# Patient Record
Sex: MALE | Race: OTHER
[De-identification: names, ages, dates, MRNs, and addresses within clinical notes are randomized per-mention and may not be internally consistent; named-entity substitution may affect disease eponyms.]

---

## 2017-01-25 ENCOUNTER — APPOINTMENT (OUTPATIENT)
Dept: CARDIOLOGY | Facility: CLINIC | Age: 77
End: 2017-01-25

## 2017-01-25 VITALS
HEIGHT: 72 IN | SYSTOLIC BLOOD PRESSURE: 120 MMHG | WEIGHT: 189 LBS | DIASTOLIC BLOOD PRESSURE: 64 MMHG | BODY MASS INDEX: 25.6 KG/M2 | RESPIRATION RATE: 16 BRPM | HEART RATE: 65 BPM

## 2017-01-25 DIAGNOSIS — E78.5 HYPERLIPIDEMIA, UNSPECIFIED: ICD-10-CM

## 2017-01-25 DIAGNOSIS — I70.219 ATHEROSCLEROSIS OF NATIVE ARTERIES OF EXTREMITIES WITH INTERMITTENT CLAUDICATION, UNSPECIFIED EXTREMITY: ICD-10-CM

## 2017-01-25 DIAGNOSIS — I10 ESSENTIAL (PRIMARY) HYPERTENSION: ICD-10-CM

## 2017-01-25 DIAGNOSIS — I25.10 ATHEROSCLEROTIC HEART DISEASE OF NATIVE CORONARY ARTERY W/OUT ANGINA PECTORIS: ICD-10-CM

## 2017-02-01 ENCOUNTER — OUTPATIENT (OUTPATIENT)
Dept: OUTPATIENT SERVICES | Facility: HOSPITAL | Age: 77
LOS: 1 days | End: 2017-02-01
Payer: MEDICAID

## 2017-02-01 DIAGNOSIS — Z98.89 OTHER SPECIFIED POSTPROCEDURAL STATES: Chronic | ICD-10-CM

## 2017-02-01 DIAGNOSIS — Z98.61 CORONARY ANGIOPLASTY STATUS: Chronic | ICD-10-CM

## 2017-02-20 DIAGNOSIS — R69 ILLNESS, UNSPECIFIED: ICD-10-CM

## 2017-03-15 ENCOUNTER — APPOINTMENT (OUTPATIENT)
Dept: CARDIOLOGY | Facility: CLINIC | Age: 77
End: 2017-03-15

## 2017-03-15 VITALS
SYSTOLIC BLOOD PRESSURE: 124 MMHG | HEART RATE: 72 BPM | DIASTOLIC BLOOD PRESSURE: 75 MMHG | RESPIRATION RATE: 12 BRPM | BODY MASS INDEX: 24.38 KG/M2 | HEIGHT: 72 IN | WEIGHT: 180 LBS

## 2017-05-12 ENCOUNTER — APPOINTMENT (OUTPATIENT)
Dept: OTOLARYNGOLOGY | Facility: CLINIC | Age: 77
End: 2017-05-12

## 2017-05-12 VITALS
HEART RATE: 79 BPM | WEIGHT: 180 LBS | BODY MASS INDEX: 24.38 KG/M2 | HEIGHT: 72 IN | DIASTOLIC BLOOD PRESSURE: 76 MMHG | SYSTOLIC BLOOD PRESSURE: 136 MMHG

## 2017-05-12 DIAGNOSIS — Z86.79 PERSONAL HISTORY OF OTHER DISEASES OF THE CIRCULATORY SYSTEM: ICD-10-CM

## 2017-05-12 DIAGNOSIS — Z86.39 PERSONAL HISTORY OF OTHER ENDOCRINE, NUTRITIONAL AND METABOLIC DISEASE: ICD-10-CM

## 2017-05-24 ENCOUNTER — APPOINTMENT (OUTPATIENT)
Dept: CARDIOLOGY | Facility: CLINIC | Age: 77
End: 2017-05-24

## 2017-05-24 VITALS
SYSTOLIC BLOOD PRESSURE: 126 MMHG | WEIGHT: 186 LBS | BODY MASS INDEX: 25.19 KG/M2 | RESPIRATION RATE: 15 BRPM | HEIGHT: 72 IN | HEART RATE: 86 BPM | DIASTOLIC BLOOD PRESSURE: 72 MMHG

## 2017-06-21 ENCOUNTER — APPOINTMENT (OUTPATIENT)
Dept: CARDIOLOGY | Facility: CLINIC | Age: 77
End: 2017-06-21

## 2017-06-21 VITALS
HEART RATE: 76 BPM | BODY MASS INDEX: 24.52 KG/M2 | WEIGHT: 181 LBS | HEIGHT: 72 IN | SYSTOLIC BLOOD PRESSURE: 101 MMHG | RESPIRATION RATE: 12 BRPM | DIASTOLIC BLOOD PRESSURE: 66 MMHG

## 2017-06-21 RX ORDER — AMMONIUM LACTATE 12 %
12 CREAM (GRAM) TOPICAL
Qty: 385 | Refills: 0 | Status: ACTIVE | COMMUNITY
Start: 2017-02-10

## 2017-06-21 RX ORDER — MOMETASONE FUROATE 1 MG/G
0.1 OINTMENT TOPICAL
Qty: 45 | Refills: 0 | Status: ACTIVE | COMMUNITY
Start: 2017-01-05

## 2017-06-21 RX ORDER — ACETIC ACID 20 MG/ML
2 SOLUTION AURICULAR (OTIC)
Qty: 15 | Refills: 0 | Status: ACTIVE | COMMUNITY
Start: 2017-02-10

## 2017-07-07 ENCOUNTER — APPOINTMENT (OUTPATIENT)
Dept: OTOLARYNGOLOGY | Facility: CLINIC | Age: 77
End: 2017-07-07

## 2017-07-07 RX ORDER — FLUOCINOLONE ACETONIDE 0.11 MG/ML
0.01 OIL AURICULAR (OTIC)
Qty: 1 | Refills: 1 | Status: ACTIVE | COMMUNITY
Start: 2017-07-07 | End: 1900-01-01

## 2017-08-01 PROCEDURE — G9001: CPT

## 2017-08-23 ENCOUNTER — APPOINTMENT (OUTPATIENT)
Dept: CARDIOLOGY | Facility: CLINIC | Age: 77
End: 2017-08-23
Payer: MEDICARE

## 2017-08-23 VITALS
BODY MASS INDEX: 24.38 KG/M2 | SYSTOLIC BLOOD PRESSURE: 123 MMHG | RESPIRATION RATE: 15 BRPM | HEIGHT: 72 IN | DIASTOLIC BLOOD PRESSURE: 78 MMHG | HEART RATE: 67 BPM | WEIGHT: 180 LBS

## 2017-08-23 PROCEDURE — 99214 OFFICE O/P EST MOD 30 MIN: CPT

## 2017-08-24 ENCOUNTER — APPOINTMENT (OUTPATIENT)
Dept: OTOLARYNGOLOGY | Facility: CLINIC | Age: 77
End: 2017-08-24
Payer: MEDICARE

## 2017-08-24 VITALS — HEIGHT: 72 IN | WEIGHT: 176.37 LBS | BODY MASS INDEX: 23.89 KG/M2

## 2017-08-24 PROCEDURE — 99214 OFFICE O/P EST MOD 30 MIN: CPT | Mod: 25

## 2017-08-24 PROCEDURE — 31579 LARYNGOSCOPY TELESCOPIC: CPT

## 2017-08-24 RX ORDER — NYSTATIN 100000 [USP'U]/ML
100000 SUSPENSION ORAL 3 TIMES DAILY
Qty: 350 | Refills: 3 | Status: ACTIVE | COMMUNITY
Start: 2017-08-24 | End: 1900-01-01

## 2017-08-24 RX ORDER — OMEPRAZOLE 40 MG/1
40 CAPSULE, DELAYED RELEASE ORAL
Qty: 90 | Refills: 0 | Status: ACTIVE | COMMUNITY
Start: 2017-08-24 | End: 1900-01-01

## 2017-09-27 ENCOUNTER — APPOINTMENT (OUTPATIENT)
Dept: CARDIOLOGY | Facility: CLINIC | Age: 77
End: 2017-09-27
Payer: MEDICARE

## 2017-09-27 VITALS
SYSTOLIC BLOOD PRESSURE: 136 MMHG | HEART RATE: 76 BPM | DIASTOLIC BLOOD PRESSURE: 69 MMHG | BODY MASS INDEX: 34.16 KG/M2 | HEIGHT: 60 IN | WEIGHT: 174 LBS | RESPIRATION RATE: 12 BRPM

## 2017-09-27 PROCEDURE — 99214 OFFICE O/P EST MOD 30 MIN: CPT

## 2017-09-27 RX ORDER — KETOCONAZOLE 20 MG/G
2 CREAM TOPICAL
Qty: 30 | Refills: 0 | Status: ACTIVE | COMMUNITY
Start: 2017-06-06

## 2017-09-27 RX ORDER — TRIAMCINOLONE ACETONIDE 1 MG/G
0.1 CREAM TOPICAL
Qty: 454 | Refills: 0 | Status: ACTIVE | COMMUNITY
Start: 2017-05-31

## 2017-09-27 RX ORDER — HYDROCORTISONE 25 MG/G
2.5 OINTMENT TOPICAL
Qty: 28 | Refills: 0 | Status: ACTIVE | COMMUNITY
Start: 2017-05-26

## 2017-09-27 RX ORDER — TRIAMCINOLONE ACETONIDE 0.25 MG/G
0.03 CREAM TOPICAL
Qty: 454 | Refills: 0 | Status: ACTIVE | COMMUNITY
Start: 2017-06-14

## 2017-09-27 RX ORDER — CLINDAMYCIN PHOSPHATE 10 MG/ML
1 SOLUTION TOPICAL
Qty: 60 | Refills: 0 | Status: ACTIVE | COMMUNITY
Start: 2017-07-12

## 2017-09-27 RX ORDER — MOMETASONE FUROATE 1 MG/G
0.1 CREAM TOPICAL
Qty: 45 | Refills: 0 | Status: ACTIVE | COMMUNITY
Start: 2017-06-06

## 2017-09-27 RX ORDER — LORAZEPAM 0.5 MG/1
0.5 TABLET ORAL
Qty: 60 | Refills: 0 | Status: ACTIVE | COMMUNITY
Start: 2017-04-12

## 2017-09-27 RX ORDER — PREDNISONE 20 MG/1
20 TABLET ORAL
Qty: 10 | Refills: 0 | Status: ACTIVE | COMMUNITY
Start: 2017-05-31

## 2017-09-27 RX ORDER — TAMSULOSIN HYDROCHLORIDE 0.4 MG/1
0.4 CAPSULE ORAL
Qty: 30 | Refills: 0 | Status: ACTIVE | COMMUNITY
Start: 2017-08-22

## 2017-10-02 ENCOUNTER — APPOINTMENT (OUTPATIENT)
Dept: RADIOLOGY | Facility: HOSPITAL | Age: 77
End: 2017-10-02
Payer: MEDICARE

## 2017-10-02 ENCOUNTER — APPOINTMENT (OUTPATIENT)
Dept: SPEECH THERAPY | Facility: HOSPITAL | Age: 77
End: 2017-10-02
Payer: MEDICARE

## 2017-10-02 ENCOUNTER — OUTPATIENT (OUTPATIENT)
Dept: OUTPATIENT SERVICES | Facility: HOSPITAL | Age: 77
LOS: 1 days | Discharge: HOME | End: 2017-10-02

## 2017-10-02 ENCOUNTER — OUTPATIENT (OUTPATIENT)
Dept: OUTPATIENT SERVICES | Facility: HOSPITAL | Age: 77
LOS: 1 days | End: 2017-10-02

## 2017-10-02 DIAGNOSIS — Z98.61 CORONARY ANGIOPLASTY STATUS: Chronic | ICD-10-CM

## 2017-10-02 DIAGNOSIS — Z98.89 OTHER SPECIFIED POSTPROCEDURAL STATES: Chronic | ICD-10-CM

## 2017-10-02 DIAGNOSIS — R13.10 DYSPHAGIA, UNSPECIFIED: ICD-10-CM

## 2017-10-02 PROCEDURE — 74230 X-RAY XM SWLNG FUNCJ C+: CPT | Mod: 26

## 2017-10-20 ENCOUNTER — APPOINTMENT (OUTPATIENT)
Dept: OTOLARYNGOLOGY | Facility: CLINIC | Age: 77
End: 2017-10-20

## 2017-10-25 ENCOUNTER — APPOINTMENT (OUTPATIENT)
Dept: CARDIOLOGY | Facility: CLINIC | Age: 77
End: 2017-10-25
Payer: MEDICARE

## 2017-10-25 VITALS
HEART RATE: 67 BPM | BODY MASS INDEX: 24.62 KG/M2 | SYSTOLIC BLOOD PRESSURE: 128 MMHG | DIASTOLIC BLOOD PRESSURE: 74 MMHG | WEIGHT: 172 LBS | RESPIRATION RATE: 15 BRPM | HEIGHT: 70 IN

## 2017-10-25 PROCEDURE — 99214 OFFICE O/P EST MOD 30 MIN: CPT

## 2017-10-25 PROCEDURE — 93000 ELECTROCARDIOGRAM COMPLETE: CPT

## 2017-10-27 ENCOUNTER — RX RENEWAL (OUTPATIENT)
Age: 77
End: 2017-10-27

## 2017-11-13 DIAGNOSIS — R13.13 DYSPHAGIA, PHARYNGEAL PHASE: ICD-10-CM

## 2017-11-16 ENCOUNTER — APPOINTMENT (OUTPATIENT)
Dept: SPEECH THERAPY | Facility: CLINIC | Age: 77
End: 2017-11-16

## 2017-11-30 ENCOUNTER — APPOINTMENT (OUTPATIENT)
Dept: SPEECH THERAPY | Facility: CLINIC | Age: 77
End: 2017-11-30

## 2017-12-07 ENCOUNTER — APPOINTMENT (OUTPATIENT)
Dept: SPEECH THERAPY | Facility: CLINIC | Age: 77
End: 2017-12-07

## 2017-12-14 ENCOUNTER — APPOINTMENT (OUTPATIENT)
Dept: SPEECH THERAPY | Facility: CLINIC | Age: 77
End: 2017-12-14

## 2017-12-20 ENCOUNTER — APPOINTMENT (OUTPATIENT)
Dept: CARDIOLOGY | Facility: CLINIC | Age: 77
End: 2017-12-20
Payer: MEDICARE

## 2017-12-20 VITALS
RESPIRATION RATE: 12 BRPM | BODY MASS INDEX: 24.91 KG/M2 | WEIGHT: 174 LBS | HEIGHT: 70 IN | SYSTOLIC BLOOD PRESSURE: 118 MMHG | HEART RATE: 82 BPM | DIASTOLIC BLOOD PRESSURE: 69 MMHG

## 2017-12-20 PROCEDURE — 99214 OFFICE O/P EST MOD 30 MIN: CPT

## 2017-12-21 ENCOUNTER — APPOINTMENT (OUTPATIENT)
Dept: SPEECH THERAPY | Facility: CLINIC | Age: 77
End: 2017-12-21

## 2017-12-28 ENCOUNTER — APPOINTMENT (OUTPATIENT)
Dept: SPEECH THERAPY | Facility: CLINIC | Age: 77
End: 2017-12-28

## 2018-01-04 ENCOUNTER — APPOINTMENT (OUTPATIENT)
Dept: SPEECH THERAPY | Facility: CLINIC | Age: 78
End: 2018-01-04

## 2018-01-05 ENCOUNTER — RX RENEWAL (OUTPATIENT)
Age: 78
End: 2018-01-05

## 2018-01-05 RX ORDER — RANITIDINE 300 MG/1
300 TABLET ORAL
Qty: 30 | Refills: 0 | Status: ACTIVE | COMMUNITY
Start: 2017-07-07 | End: 1900-01-01

## 2018-01-11 ENCOUNTER — APPOINTMENT (OUTPATIENT)
Dept: SPEECH THERAPY | Facility: CLINIC | Age: 78
End: 2018-01-11

## 2018-01-18 ENCOUNTER — APPOINTMENT (OUTPATIENT)
Dept: SPEECH THERAPY | Facility: CLINIC | Age: 78
End: 2018-01-18

## 2018-01-24 ENCOUNTER — APPOINTMENT (OUTPATIENT)
Dept: CARDIOLOGY | Facility: CLINIC | Age: 78
End: 2018-01-24
Payer: MEDICARE

## 2018-01-24 VITALS
HEART RATE: 78 BPM | BODY MASS INDEX: 24.62 KG/M2 | WEIGHT: 172 LBS | RESPIRATION RATE: 16 BRPM | HEIGHT: 70 IN | SYSTOLIC BLOOD PRESSURE: 124 MMHG | DIASTOLIC BLOOD PRESSURE: 60 MMHG

## 2018-01-24 PROCEDURE — 93000 ELECTROCARDIOGRAM COMPLETE: CPT

## 2018-01-24 PROCEDURE — 99214 OFFICE O/P EST MOD 30 MIN: CPT

## 2018-01-25 ENCOUNTER — APPOINTMENT (OUTPATIENT)
Dept: SPEECH THERAPY | Facility: CLINIC | Age: 78
End: 2018-01-25

## 2018-01-25 ENCOUNTER — APPOINTMENT (OUTPATIENT)
Dept: OTOLARYNGOLOGY | Facility: CLINIC | Age: 78
End: 2018-01-25
Payer: MEDICARE

## 2018-01-25 VITALS
HEIGHT: 67 IN | WEIGHT: 185 LBS | DIASTOLIC BLOOD PRESSURE: 78 MMHG | HEART RATE: 68 BPM | SYSTOLIC BLOOD PRESSURE: 134 MMHG | BODY MASS INDEX: 29.03 KG/M2

## 2018-01-25 VITALS — WEIGHT: 185 LBS | HEIGHT: 67.32 IN | BODY MASS INDEX: 28.7 KG/M2

## 2018-01-25 DIAGNOSIS — H92.02 OTALGIA, LEFT EAR: ICD-10-CM

## 2018-01-25 DIAGNOSIS — R22.1 LOCALIZED SWELLING, MASS AND LUMP, NECK: ICD-10-CM

## 2018-01-25 PROCEDURE — ZZZZZ: CPT

## 2018-01-25 PROCEDURE — 31575 DIAGNOSTIC LARYNGOSCOPY: CPT

## 2018-01-25 PROCEDURE — 99214 OFFICE O/P EST MOD 30 MIN: CPT | Mod: 25

## 2018-01-25 RX ORDER — PANCRELIPASE 36000; 180000; 114000 [USP'U]/1; [USP'U]/1; [USP'U]/1
36000-114000 CAPSULE, DELAYED RELEASE PELLETS ORAL
Refills: 0 | Status: ACTIVE | COMMUNITY

## 2018-01-25 RX ORDER — ASPIRIN 325 MG/1
TABLET, FILM COATED ORAL
Refills: 0 | Status: ACTIVE | COMMUNITY

## 2018-01-26 ENCOUNTER — APPOINTMENT (OUTPATIENT)
Dept: CARDIOLOGY | Facility: CLINIC | Age: 78
End: 2018-01-26
Payer: MEDICARE

## 2018-01-26 PROCEDURE — 93306 TTE W/DOPPLER COMPLETE: CPT

## 2018-02-02 ENCOUNTER — OUTPATIENT (OUTPATIENT)
Dept: OUTPATIENT SERVICES | Facility: HOSPITAL | Age: 78
LOS: 1 days | End: 2018-02-02

## 2018-02-02 VITALS
WEIGHT: 179.02 LBS | SYSTOLIC BLOOD PRESSURE: 138 MMHG | TEMPERATURE: 98 F | DIASTOLIC BLOOD PRESSURE: 72 MMHG | RESPIRATION RATE: 18 BRPM | HEIGHT: 67.5 IN | HEART RATE: 66 BPM

## 2018-02-02 DIAGNOSIS — E11.9 TYPE 2 DIABETES MELLITUS WITHOUT COMPLICATIONS: ICD-10-CM

## 2018-02-02 DIAGNOSIS — R49.0 DYSPHONIA: ICD-10-CM

## 2018-02-02 DIAGNOSIS — I73.9 PERIPHERAL VASCULAR DISEASE, UNSPECIFIED: ICD-10-CM

## 2018-02-02 DIAGNOSIS — Z98.89 OTHER SPECIFIED POSTPROCEDURAL STATES: Chronic | ICD-10-CM

## 2018-02-02 DIAGNOSIS — I25.10 ATHEROSCLEROTIC HEART DISEASE OF NATIVE CORONARY ARTERY WITHOUT ANGINA PECTORIS: ICD-10-CM

## 2018-02-02 DIAGNOSIS — I10 ESSENTIAL (PRIMARY) HYPERTENSION: ICD-10-CM

## 2018-02-02 DIAGNOSIS — Z98.61 CORONARY ANGIOPLASTY STATUS: Chronic | ICD-10-CM

## 2018-02-02 LAB
BUN SERPL-MCNC: 13 MG/DL — SIGNIFICANT CHANGE UP (ref 7–23)
CALCIUM SERPL-MCNC: 8.9 MG/DL — SIGNIFICANT CHANGE UP (ref 8.4–10.5)
CHLORIDE SERPL-SCNC: 99 MMOL/L — SIGNIFICANT CHANGE UP (ref 98–107)
CO2 SERPL-SCNC: 26 MMOL/L — SIGNIFICANT CHANGE UP (ref 22–31)
CREAT SERPL-MCNC: 0.93 MG/DL — SIGNIFICANT CHANGE UP (ref 0.5–1.3)
GLUCOSE SERPL-MCNC: 215 MG/DL — HIGH (ref 70–99)
HBA1C BLD-MCNC: 6.7 % — HIGH (ref 4–5.6)
HCT VFR BLD CALC: 37.5 % — LOW (ref 39–50)
HGB BLD-MCNC: 12.8 G/DL — LOW (ref 13–17)
MCHC RBC-ENTMCNC: 29.9 PG — SIGNIFICANT CHANGE UP (ref 27–34)
MCHC RBC-ENTMCNC: 34.1 % — SIGNIFICANT CHANGE UP (ref 32–36)
MCV RBC AUTO: 87.6 FL — SIGNIFICANT CHANGE UP (ref 80–100)
NRBC # FLD: 0 — SIGNIFICANT CHANGE UP
PLATELET # BLD AUTO: 135 K/UL — LOW (ref 150–400)
PMV BLD: 10.3 FL — SIGNIFICANT CHANGE UP (ref 7–13)
POTASSIUM SERPL-MCNC: 4 MMOL/L — SIGNIFICANT CHANGE UP (ref 3.5–5.3)
POTASSIUM SERPL-SCNC: 4 MMOL/L — SIGNIFICANT CHANGE UP (ref 3.5–5.3)
RBC # BLD: 4.28 M/UL — SIGNIFICANT CHANGE UP (ref 4.2–5.8)
RBC # FLD: 13.4 % — SIGNIFICANT CHANGE UP (ref 10.3–14.5)
SODIUM SERPL-SCNC: 139 MMOL/L — SIGNIFICANT CHANGE UP (ref 135–145)
WBC # BLD: 6.53 K/UL — SIGNIFICANT CHANGE UP (ref 3.8–10.5)
WBC # FLD AUTO: 6.53 K/UL — SIGNIFICANT CHANGE UP (ref 3.8–10.5)

## 2018-02-02 RX ORDER — CILOSTAZOL 100 MG/1
1 TABLET ORAL
Qty: 0 | Refills: 0 | COMMUNITY

## 2018-02-02 RX ORDER — SODIUM CHLORIDE 9 MG/ML
1000 INJECTION, SOLUTION INTRAVENOUS
Qty: 0 | Refills: 0 | Status: DISCONTINUED | OUTPATIENT
Start: 2018-02-07 | End: 2018-02-22

## 2018-02-02 RX ORDER — LINAGLIPTIN 5 MG/1
1 TABLET, FILM COATED ORAL
Qty: 0 | Refills: 0 | COMMUNITY

## 2018-02-02 NOTE — H&P PST ADULT - NEGATIVE NEUROLOGICAL SYMPTOMS
no transient paralysis/no headache/no paresthesias/no vertigo/no loss of sensation/no weakness/no generalized seizures/no difficulty walking

## 2018-02-02 NOTE — H&P PST ADULT - PSH
H/O coronary angioplasty    History of appendectomy    S/P peripheral artery angioplasty  with SFA stents  "I'm not sure"

## 2018-02-02 NOTE — H&P PST ADULT - MUSCULOSKELETAL
details… detailed exam no joint warmth/no calf tenderness/normal strength/no joint swelling/no joint erythema/ROM intact

## 2018-02-02 NOTE — H&P PST ADULT - NEGATIVE MUSCULOSKELETAL SYMPTOMS
no muscle weakness/no back pain/no leg pain L/no myalgia/no stiffness/no arm pain R/no leg pain R/no arthralgia/no muscle cramps/no joint swelling

## 2018-02-02 NOTE — H&P PST ADULT - ENT GEN HX ROS MEA POS PC
slight dysphagia, globulus sensation/hearing difficulty dysphonia, slight dysphagia, globulus sensation/hearing difficulty

## 2018-02-02 NOTE — H&P PST ADULT - PROBLEM SELECTOR PLAN 3
Pt instructed to hold trajenta and glipizide AM of procedure.  OR booking notified of pt's diabetes status via fax.

## 2018-02-02 NOTE — H&P PST ADULT - HISTORY OF PRESENT ILLNESS
76 yo male with PMH DM2, CAD, hypertension, hyperlipidemia, PVD presents to pre surgical testing.  Pt reports he started to experience voice hoarseness and globus sensation for 1.5 years, and received therapy with limited relief.  Pt reports MRI done, revealed posterior neck mass.  Pt reports laryngoscopy done Jan 2017, revealed swelling and inflammation.   Pt is scheduled for injection laryngoplasty on 2/7/18.  Patient is a poor historian. 76 yo male with PMH DM2, CAD, hypertension, hyperlipidemia, PVD presents to pre surgical testing.  Pt reports he started to experience voice hoarseness and globus sensation for 1.5 years, and received therapy with limited relief.  Pt reports MRI done, revealed posterior neck mass.  Pt reports slight dysphagia, esophagogram done, revealed no aspiration.  Pt reports laryngoscopy done Jan 2017, revealed swelling and inflammation.   Pt is scheduled for injection laryngoplasty on 2/7/18.

## 2018-02-02 NOTE — H&P PST ADULT - PROBLEM SELECTOR PLAN 1
Pt is scheduled for injection laryngoplasty on 2/7/18.  Pre op instructions including chlorhexidine wash given and pt able to verbalize understanding.

## 2018-02-02 NOTE — H&P PST ADULT - PMH
CAD (coronary artery disease)  5 stents, last one placed around 2014  DM (diabetes mellitus)  type 2  Dysphagia    Dysphonia    Former smoker    HLD (hyperlipidemia)    HTN (hypertension)    MI, old  patient denies  PVD (peripheral vascular disease)    Stented coronary artery

## 2018-02-02 NOTE — H&P PST ADULT - NSANTHOSAYNRD_GEN_A_CORE
No. COLT screening performed.  STOP BANG Legend: 0-2 = LOW Risk; 3-4 = INTERMEDIATE Risk; 5-8 = HIGH Risk

## 2018-02-02 NOTE — H&P PST ADULT - PROBLEM SELECTOR PLAN 4
Pt instructed to take losartan and atenolol AM of surgery with a sip of water.  Pt met STOP BANG score for COLT precaution. OR booking notified via fax.

## 2018-02-02 NOTE — H&P PST ADULT - LYMPHATIC
posterior cervical R/anterior cervical R/posterior cervical L/anterior cervical L/supraclavicular L/supraclavicular R

## 2018-02-02 NOTE — H&P PST ADULT - NEGATIVE CARDIOVASCULAR SYMPTOMS
no paroxysmal nocturnal dyspnea/no palpitations/no chest pain/no dyspnea on exertion/no orthopnea/no peripheral edema

## 2018-02-07 ENCOUNTER — APPOINTMENT (OUTPATIENT)
Dept: OTOLARYNGOLOGY | Facility: HOSPITAL | Age: 78
End: 2018-02-07

## 2018-02-07 ENCOUNTER — OUTPATIENT (OUTPATIENT)
Dept: OUTPATIENT SERVICES | Facility: HOSPITAL | Age: 78
LOS: 1 days | Discharge: HOME | End: 2018-02-07
Payer: MEDICARE

## 2018-02-07 ENCOUNTER — RESULT REVIEW (OUTPATIENT)
Age: 78
End: 2018-02-07

## 2018-02-07 VITALS
RESPIRATION RATE: 16 BRPM | DIASTOLIC BLOOD PRESSURE: 81 MMHG | OXYGEN SATURATION: 100 % | HEART RATE: 77 BPM | SYSTOLIC BLOOD PRESSURE: 148 MMHG

## 2018-02-07 VITALS
HEART RATE: 55 BPM | TEMPERATURE: 98 F | RESPIRATION RATE: 16 BRPM | OXYGEN SATURATION: 97 % | DIASTOLIC BLOOD PRESSURE: 67 MMHG | SYSTOLIC BLOOD PRESSURE: 115 MMHG

## 2018-02-07 DIAGNOSIS — Z98.89 OTHER SPECIFIED POSTPROCEDURAL STATES: Chronic | ICD-10-CM

## 2018-02-07 DIAGNOSIS — Z98.61 CORONARY ANGIOPLASTY STATUS: Chronic | ICD-10-CM

## 2018-02-07 DIAGNOSIS — R49.0 DYSPHONIA: ICD-10-CM

## 2018-02-07 PROCEDURE — 31571 LARYNGOSCOP W/VC INJ + SCOPE: CPT

## 2018-02-07 PROCEDURE — 88304 TISSUE EXAM BY PATHOLOGIST: CPT | Mod: 26

## 2018-02-07 PROCEDURE — 21556 EXC NECK TUM DEEP < 5 CM: CPT | Mod: GC

## 2018-02-07 RX ORDER — HYDROMORPHONE HYDROCHLORIDE 2 MG/ML
0.5 INJECTION INTRAMUSCULAR; INTRAVENOUS; SUBCUTANEOUS
Qty: 0 | Refills: 0 | Status: DISCONTINUED | OUTPATIENT
Start: 2018-02-07 | End: 2018-02-08

## 2018-02-07 RX ORDER — SODIUM CHLORIDE 9 MG/ML
1000 INJECTION, SOLUTION INTRAVENOUS
Qty: 0 | Refills: 0 | Status: DISCONTINUED | OUTPATIENT
Start: 2018-02-07 | End: 2018-02-22

## 2018-02-07 RX ORDER — FENTANYL CITRATE 50 UG/ML
25 INJECTION INTRAVENOUS
Qty: 0 | Refills: 0 | Status: DISCONTINUED | OUTPATIENT
Start: 2018-02-07 | End: 2018-02-08

## 2018-02-07 RX ORDER — OXYCODONE AND ACETAMINOPHEN 5; 325 MG/1; MG/1
1 TABLET ORAL EVERY 6 HOURS
Qty: 0 | Refills: 0 | Status: DISCONTINUED | OUTPATIENT
Start: 2018-02-07 | End: 2018-02-07

## 2018-02-07 RX ADMIN — HYDROMORPHONE HYDROCHLORIDE 0.5 MILLIGRAM(S): 2 INJECTION INTRAMUSCULAR; INTRAVENOUS; SUBCUTANEOUS at 20:00

## 2018-02-07 RX ADMIN — HYDROMORPHONE HYDROCHLORIDE 0.5 MILLIGRAM(S): 2 INJECTION INTRAMUSCULAR; INTRAVENOUS; SUBCUTANEOUS at 19:50

## 2018-02-07 RX ADMIN — SODIUM CHLORIDE 30 MILLILITER(S): 9 INJECTION, SOLUTION INTRAVENOUS at 13:05

## 2018-02-07 RX ADMIN — SODIUM CHLORIDE 100 MILLILITER(S): 9 INJECTION, SOLUTION INTRAVENOUS at 19:15

## 2018-02-07 NOTE — ASU DISCHARGE PLAN (ADULT/PEDIATRIC). - SPECIAL INSTRUCTIONS
Keep incision dry for 48 hrs then may shower normally.    No shouting or whispering for 5 days.  You may speak in normal voice only.  Minimize talking as much as possible

## 2018-02-07 NOTE — ASU DISCHARGE PLAN (ADULT/PEDIATRIC). - MEDICATION SUMMARY - MEDICATIONS TO TAKE
I will START or STAY ON the medications listed below when I get home from the hospital:    oxyCODONE-acetaminophen 5 mg-325 mg oral tablet  -- 1 tab(s) by mouth every 6 hours, As needed, Moderate Pain (4 - 6) MDD:4  -- Indication: For pain    aspirin 81 mg oral tablet  -- 1 tab(s) by mouth once a day in am  -- Indication: For home med    losartan 50 mg oral tablet  -- 1 tab(s) by mouth once a day in am  -- Indication: For home med    tamsulosin 0.4 mg oral capsule  -- 1 cap(s) by mouth once a day in pm  -- Indication: For home med    LORazepam 0.5 mg oral tablet  -- 1 tab(s) by mouth once a day in pm  -- Indication: For home med    glipiZIDE 10 mg oral tablet, extended release  -- 1 tab(s) by mouth once a day in am  -- Indication: For home med    Tradjenta 5 mg oral tablet  -- 1 tab(s) by mouth once a day AM  -- Indication: For home med    loratadine 10 mg oral tablet  -- 1 tab(s) by mouth 2 times a day  -- Indication: For home med    atorvastatin 10 mg oral tablet  -- 1 tab(s) by mouth once a day in the pm  -- Indication: For home med    Effient 5 mg oral tablet  -- 1 tab(s) by mouth once a day in the am/ last dose 01/29/18  -- Indication: For home med    atenolol 25 mg oral tablet  -- 1 tab(s) by mouth once a day in am  -- Indication: For home med    Creon 36,000 units oral delayed release capsule  -- 1 cap(s) by mouth 3 times a day  -- Indication: For home med    raNITIdine 300 mg oral tablet  -- 1 tab(s) by mouth once a day (at bedtime)  -- Indication: For home med    Senna 8.6 mg oral tablet  -- 2 tab(s) by mouth 2 times a day  -- Indication: For home med    cilostazol 100 mg oral tablet  -- 1 tab(s) by mouth 2 times a day/ last dose 01/29/18  -- Indication: For home med    Dexilant 60 mg oral delayed release capsule  -- 1 cap(s) by mouth once a day in the am  -- Indication: For home med    Thera M oral tablet  -- 1 tab(s) by mouth once a day/ last dose 2/2/18  -- Indication: For home med

## 2018-02-07 NOTE — ASU DISCHARGE PLAN (ADULT/PEDIATRIC). - NURSING INSTRUCTIONS
DO NOT take any Tylenol (Acetaminophen) or narcotics containing Tylenol until after  10:45. You received Tylenol during your operation and it can cause damage to your liver if too much is taken within a 24 hour time period.

## 2018-02-07 NOTE — ASU PREOP CHECKLIST - SELECT TESTS ORDERED
CMP/EKG/Type and Screen/CBC Type and Screen/CMP/107/CBC/EKG EKG/Type and Screen/CMP/107/POCT Blood Glucose/CBC

## 2018-02-07 NOTE — ASU DISCHARGE PLAN (ADULT/PEDIATRIC). - POST OP PHONE #
pt. granted permission to leave message /and or speak with whoever answers the phone.  pt. granted permission to leave message /and or speak with whoever answers the phone.

## 2018-02-08 ENCOUNTER — TRANSCRIPTION ENCOUNTER (OUTPATIENT)
Age: 78
End: 2018-02-08

## 2018-02-09 DIAGNOSIS — R13.12 DYSPHAGIA, OROPHARYNGEAL PHASE: ICD-10-CM

## 2018-02-11 ENCOUNTER — TRANSCRIPTION ENCOUNTER (OUTPATIENT)
Age: 78
End: 2018-02-11

## 2018-02-21 ENCOUNTER — APPOINTMENT (OUTPATIENT)
Dept: CARDIOLOGY | Facility: CLINIC | Age: 78
End: 2018-02-21

## 2018-02-28 ENCOUNTER — APPOINTMENT (OUTPATIENT)
Dept: OTOLARYNGOLOGY | Facility: CLINIC | Age: 78
End: 2018-02-28
Payer: MEDICARE

## 2018-02-28 VITALS
HEIGHT: 67 IN | HEART RATE: 71 BPM | BODY MASS INDEX: 29.03 KG/M2 | WEIGHT: 185 LBS | DIASTOLIC BLOOD PRESSURE: 77 MMHG | SYSTOLIC BLOOD PRESSURE: 137 MMHG

## 2018-02-28 DIAGNOSIS — J38.3 OTHER DISEASES OF VOCAL CORDS: ICD-10-CM

## 2018-02-28 DIAGNOSIS — R49.0 DYSPHONIA: ICD-10-CM

## 2018-02-28 DIAGNOSIS — R13.12 DYSPHAGIA, OROPHARYNGEAL PHASE: ICD-10-CM

## 2018-02-28 DIAGNOSIS — J38.4 EDEMA OF LARYNX: ICD-10-CM

## 2018-02-28 PROCEDURE — 31575 DIAGNOSTIC LARYNGOSCOPY: CPT | Mod: 58

## 2018-02-28 PROCEDURE — 99024 POSTOP FOLLOW-UP VISIT: CPT

## 2018-02-28 RX ORDER — AMOXICILLIN AND CLAVULANATE POTASSIUM 875; 125 MG/1; MG/1
875-125 TABLET, COATED ORAL
Qty: 20 | Refills: 0 | Status: DISCONTINUED | COMMUNITY
Start: 2016-12-28 | End: 2018-02-28

## 2018-02-28 RX ORDER — CIPROFLOXACIN HYDROCHLORIDE 500 MG/1
500 TABLET, FILM COATED ORAL
Qty: 14 | Refills: 0 | Status: DISCONTINUED | COMMUNITY
Start: 2017-08-21 | End: 2018-02-28

## 2018-03-08 ENCOUNTER — APPOINTMENT (OUTPATIENT)
Dept: SPEECH THERAPY | Facility: CLINIC | Age: 78
End: 2018-03-08

## 2018-03-10 PROBLEM — R13.12 DYSPHAGIA, OROPHARYNGEAL: Status: ACTIVE | Noted: 2017-07-07

## 2018-03-10 PROBLEM — J38.4 LARYNGEAL EDEMA: Status: ACTIVE | Noted: 2017-07-07

## 2018-03-10 PROBLEM — J38.3 LESION OF VOCAL CORD: Status: ACTIVE | Noted: 2017-07-07

## 2018-03-10 PROBLEM — R49.0 DYSPHONIA: Status: ACTIVE | Noted: 2017-05-12

## 2018-03-15 ENCOUNTER — APPOINTMENT (OUTPATIENT)
Dept: SPEECH THERAPY | Facility: CLINIC | Age: 78
End: 2018-03-15

## 2018-03-21 DIAGNOSIS — R49.0 DYSPHONIA: ICD-10-CM

## 2018-03-29 ENCOUNTER — APPOINTMENT (OUTPATIENT)
Dept: SPEECH THERAPY | Facility: CLINIC | Age: 78
End: 2018-03-29

## 2018-04-05 ENCOUNTER — APPOINTMENT (OUTPATIENT)
Dept: SPEECH THERAPY | Facility: CLINIC | Age: 78
End: 2018-04-05

## 2018-04-09 ENCOUNTER — APPOINTMENT (OUTPATIENT)
Dept: SPEECH THERAPY | Facility: CLINIC | Age: 78
End: 2018-04-09

## 2018-04-11 ENCOUNTER — APPOINTMENT (OUTPATIENT)
Dept: CARDIOLOGY | Facility: CLINIC | Age: 78
End: 2018-04-11
Payer: MEDICARE

## 2018-04-11 VITALS
DIASTOLIC BLOOD PRESSURE: 60 MMHG | BODY MASS INDEX: 28.41 KG/M2 | HEIGHT: 67 IN | RESPIRATION RATE: 15 BRPM | WEIGHT: 181 LBS | HEART RATE: 65 BPM | SYSTOLIC BLOOD PRESSURE: 125 MMHG

## 2018-04-11 PROCEDURE — 99214 OFFICE O/P EST MOD 30 MIN: CPT

## 2018-04-11 PROCEDURE — 93000 ELECTROCARDIOGRAM COMPLETE: CPT

## 2018-04-11 RX ORDER — ISOPROPYL ALCOHOL 0.75 G/1
SWAB TOPICAL
Qty: 100 | Refills: 0 | Status: ACTIVE | COMMUNITY
Start: 2017-11-17

## 2018-04-11 RX ORDER — CLINDAMYCIN HYDROCHLORIDE 150 MG/1
150 CAPSULE ORAL
Qty: 28 | Refills: 0 | Status: ACTIVE | COMMUNITY
Start: 2018-01-19

## 2018-04-11 RX ORDER — OXYCODONE AND ACETAMINOPHEN 5; 325 MG/1; MG/1
5-325 TABLET ORAL
Qty: 5 | Refills: 0 | Status: ACTIVE | COMMUNITY
Start: 2018-02-08

## 2018-04-11 RX ORDER — RANITIDINE 150 MG/1
150 TABLET ORAL
Qty: 30 | Refills: 0 | Status: ACTIVE | COMMUNITY
Start: 2018-02-24

## 2018-04-12 VITALS
RESPIRATION RATE: 12 BRPM | WEIGHT: 180 LBS | DIASTOLIC BLOOD PRESSURE: 66 MMHG | SYSTOLIC BLOOD PRESSURE: 106 MMHG | HEIGHT: 67 IN | HEART RATE: 88 BPM | BODY MASS INDEX: 28.25 KG/M2

## 2018-04-16 ENCOUNTER — APPOINTMENT (OUTPATIENT)
Dept: SPEECH THERAPY | Facility: CLINIC | Age: 78
End: 2018-04-16

## 2018-04-23 ENCOUNTER — APPOINTMENT (OUTPATIENT)
Dept: SPEECH THERAPY | Facility: CLINIC | Age: 78
End: 2018-04-23

## 2018-04-30 ENCOUNTER — APPOINTMENT (OUTPATIENT)
Dept: SPEECH THERAPY | Facility: CLINIC | Age: 78
End: 2018-04-30

## 2018-04-30 ENCOUNTER — OUTPATIENT (OUTPATIENT)
Dept: OUTPATIENT SERVICES | Facility: HOSPITAL | Age: 78
LOS: 1 days | Discharge: HOME | End: 2018-04-30

## 2018-04-30 DIAGNOSIS — Z98.89 OTHER SPECIFIED POSTPROCEDURAL STATES: Chronic | ICD-10-CM

## 2018-04-30 DIAGNOSIS — Z98.61 CORONARY ANGIOPLASTY STATUS: Chronic | ICD-10-CM

## 2018-05-07 DIAGNOSIS — R49.0 DYSPHONIA: ICD-10-CM

## 2018-05-14 ENCOUNTER — APPOINTMENT (OUTPATIENT)
Dept: SPEECH THERAPY | Facility: CLINIC | Age: 78
End: 2018-05-14

## 2018-05-21 ENCOUNTER — APPOINTMENT (OUTPATIENT)
Dept: SPEECH THERAPY | Facility: CLINIC | Age: 78
End: 2018-05-21

## 2018-05-30 ENCOUNTER — OUTPATIENT (OUTPATIENT)
Dept: OUTPATIENT SERVICES | Facility: HOSPITAL | Age: 78
LOS: 1 days | End: 2018-05-30

## 2018-05-30 ENCOUNTER — APPOINTMENT (OUTPATIENT)
Dept: SPEECH THERAPY | Facility: HOSPITAL | Age: 78
End: 2018-05-30
Payer: MEDICARE

## 2018-05-30 ENCOUNTER — APPOINTMENT (OUTPATIENT)
Dept: RADIOLOGY | Facility: HOSPITAL | Age: 78
End: 2018-05-30
Payer: MEDICARE

## 2018-05-30 DIAGNOSIS — R13.12 DYSPHAGIA, OROPHARYNGEAL PHASE: ICD-10-CM

## 2018-05-30 DIAGNOSIS — Z98.89 OTHER SPECIFIED POSTPROCEDURAL STATES: Chronic | ICD-10-CM

## 2018-05-30 DIAGNOSIS — R13.13 DYSPHAGIA, PHARYNGEAL PHASE: ICD-10-CM

## 2018-05-30 DIAGNOSIS — Z98.61 CORONARY ANGIOPLASTY STATUS: Chronic | ICD-10-CM

## 2018-05-30 PROCEDURE — 74230 X-RAY XM SWLNG FUNCJ C+: CPT | Mod: 26

## 2018-05-31 ENCOUNTER — APPOINTMENT (OUTPATIENT)
Dept: CARDIOLOGY | Facility: CLINIC | Age: 78
End: 2018-05-31
Payer: MEDICARE

## 2018-05-31 VITALS
BODY MASS INDEX: 28.35 KG/M2 | OXYGEN SATURATION: 97 % | WEIGHT: 181 LBS | RESPIRATION RATE: 17 BRPM | HEART RATE: 64 BPM | DIASTOLIC BLOOD PRESSURE: 65 MMHG | SYSTOLIC BLOOD PRESSURE: 113 MMHG | TEMPERATURE: 97.7 F

## 2018-05-31 PROCEDURE — 99215 OFFICE O/P EST HI 40 MIN: CPT

## 2018-05-31 PROCEDURE — 93000 ELECTROCARDIOGRAM COMPLETE: CPT

## 2018-05-31 PROCEDURE — 93922 UPR/L XTREMITY ART 2 LEVELS: CPT

## 2018-06-19 ENCOUNTER — APPOINTMENT (OUTPATIENT)
Dept: CARDIOLOGY | Facility: CLINIC | Age: 78
End: 2018-06-19
Payer: MEDICARE

## 2018-06-19 PROCEDURE — A9500: CPT

## 2018-06-19 PROCEDURE — 93015 CV STRESS TEST SUPVJ I&R: CPT

## 2018-06-19 PROCEDURE — 78452 HT MUSCLE IMAGE SPECT MULT: CPT

## 2018-06-20 NOTE — ASU PATIENT PROFILE, ADULT - SUBSTANCE USE COMMENT, PROFILE
Controlling High Blood Pressure  High blood pressure (hypertension) is often called the silent killer. This is because many people who have it dont know it. High blood pressure is defined as 140/90 mm Hg or higher. Know your blood pressure and remember to check it regularly. Doing so can save your life. Here are some things you can do to help control your blood pressure.    Choose heart-healthy foods  · Select low-salt, low-fat foods. Limit sodium intake to 2,400 mg per day or the amount suggested by your healthcare provider.  · Limit canned, dried, cured, packaged, and fast foods. These can contain a lot of salt.  · Eat 8 to 10 servings of fruits and vegetables every day.  · Choose lean meats, fish, or chicken.  · Eat whole-grain pasta, brown rice, and beans.  · Eat 2 to 3 servings of low-fat or fat-free dairy products.  · Ask your doctor about the DASH eating plan. This plan helps reduce blood pressure.  · When you go to a restaurant, ask that your meal be prepared with no added salt.  Maintain a healthy weight  · Ask your healthcare provider how many calories to eat a day. Then stick to that number.  · Ask your healthcare provider what weight range is healthiest for you. If you are overweight, a weight loss of only 3% to 5% of your body weight can help lower blood pressure. Generally, a good weight loss goal is to lose 10% of your body weight in a year.  · Limit snacks and sweets.  · Get regular exercise.  Get up and get active  · Choose activities you enjoy. Find ones you can do with friends or family. This includes bicycling, dancing, walking, and jogging.  · Park farther away from building entrances.  · Use stairs instead of the elevator.  · When you can, walk or bike instead of driving.  · [place] leaves, garden, or do household repairs.  · Be active at a moderate to vigorous level of physical activity for at least 40 minutes for a minimum of 3 to 4 days a week.   Manage stress  · Make time to relax and enjoy  life. Find time to laugh.  · Communicate your concerns with your loved ones and your healthcare provider.  · Visit with family and friends, and keep up with hobbies.  Limit alcohol and quit smoking  · Men should have no more than 2 drinks per day.  · Women should have no more than 1 drink per day.  · Talk with your healthcare provider about quitting smoking. Smoking significantly increases your risk for heart disease and stroke. Ask your healthcare provider about community smoking cessation programs and other options.  Medicines  If lifestyle changes arent enough, your healthcare provider may prescribe high blood pressure medicine. Take all medicines as prescribed. If you have any questions about your medicines, ask your healthcare provider before stopping or changing them.   Date Last Reviewed: 4/27/2016 © 2000-2017 [employer]. [address] [place] [address], [place] [address]. All rights reserved. This information is not intended as a substitute for professional medical care. Always follow your healthcare professional's instructions.        Walking for Fitness  Fitness walking has something for everyone, even people who are already fit. Walking is one of the safest ways to condition your body aerobically. It can boost energy, help you lose weight, and reduce stress.    Physical benefits  · Walking strengthens your heart and lungs, and tones your muscles.  · When walking, your feet land with less impact than in other sports. This reduces chances of muscle, bone, and joint injury.  · Regular walking improves your cholesterol levels and lowers your risk of heart disease. And it helps you control your blood sugar if you have diabetes.  · Walking is a weight-bearing activity, which helps maintain bone density. This can help prevent osteoporosis.  Personal rewards  · Taking walks can help you relax and manage stress. And fitness walking may make you feel better about yourself.  · Walking can help you sleep  better at night and make you less likely to be depressed.  · Regular walking may help maintain your memory as you get older.  · Walking is a great way to spend extra time with friends and family members. Be sure to invite your dog along!  Q&A about fitness walking  Q: Will walking keep me fit?  A: Yes. Regular walking at the right pace gives you all the benefits of other aerobic activities, such as jogging and swimming.  Q: Will walking help me lose weight and keep it off?  A: Yes. Per mile, walking can burn as many calories as jogging. Your health care provider can help work walking into your weight-loss plan.  Q: Is walking safe for my health?  A: Yes. Walking is safe if you have high blood pressure, diabetes, heart disease, or other conditions. Talk to your healthcare provider before you start.  Date Last Reviewed: 4/1/2017  © 7359-4391 [employer]. [address] [place] [address]. All rights reserved. This information is not intended as a substitute for professional medical care. Always follow your healthcare professional's instructions.        Weight Management: Getting Started  Healthy bodies come in all shapes and sizes. Not all bodies are made to be thin. For some people, a healthy weight is higher than the average weight listed on weight charts. Your healthcare provider can help you decide on a healthy weight for you.    Reasons to lose weight  Losing weight can help with some health problems, such as high blood pressure, heart disease, diabetes, sleep apnea, and arthritis. You may also feel more energy.  Set your long-term goal  Your goal doesn't even have to be a specific weight. You may decide on a fitness goal (such as being able to walk 10 miles a week), or a health goal (such as lowering your blood pressure). Choose a goal that is measurable and reasonable, so you know when you've reached it. A goal of reaching a BMI of less than 25 is not always reasonable (or  possible).   Make an action plan  Habits dont change overnight. Setting your goals too high can leave you feeling discouraged if you cant reach them. Be realistic. Choose one or two small changes you can make now. Set an action plan for how you are going to make these changes. When you can stick to this plan, keep making a few more small changes. Taking small steps will help you stay on the path to success.  Track your progress  Write down your goals. Then, keep a daily record of your progress. Write down what you eat and how active you are. This record lets you look back on how much youve done. It may also help when youre feeling frustrated. Reward yourself for success. Even if you dont reach every goal, give yourself credit for what you do get done.  Get support  Encouragement from others can help make losing weight easier. Ask your family members and friends for support. They may even want to join you. Also look to your healthcare provider, registered dietitian, and  for help. Your local hospital can give you more information about nutrition, exercise, and weight loss.  Date Last Reviewed: 1/31/2016  © 5109-7946 [employer], [employer]. [address] [place] [address], [place] [address]. All rights reserved. This information is not intended as a substitute for professional medical care. Always follow your healthcare professional's instructions.         Denies recreational drug use

## 2018-06-25 ENCOUNTER — APPOINTMENT (OUTPATIENT)
Dept: CARDIOLOGY | Facility: CLINIC | Age: 78
End: 2018-06-25
Payer: MEDICARE

## 2018-06-25 VITALS
OXYGEN SATURATION: 96 % | RESPIRATION RATE: 17 BRPM | WEIGHT: 178 LBS | SYSTOLIC BLOOD PRESSURE: 107 MMHG | DIASTOLIC BLOOD PRESSURE: 56 MMHG | BODY MASS INDEX: 27.88 KG/M2 | TEMPERATURE: 97.7 F | HEART RATE: 68 BPM

## 2018-06-25 PROCEDURE — 99215 OFFICE O/P EST HI 40 MIN: CPT

## 2018-06-25 RX ORDER — AMOXICILLIN 500 MG/1
500 CAPSULE ORAL
Qty: 15 | Refills: 0 | Status: ACTIVE | COMMUNITY
Start: 2018-06-12

## 2018-07-09 VITALS — HEIGHT: 68.9 IN | WEIGHT: 179.9 LBS

## 2018-07-09 RX ORDER — LORATADINE 10 MG/1
1 TABLET ORAL
Qty: 0 | Refills: 0 | COMMUNITY

## 2018-07-09 NOTE — H&P ADULT - NSHPSOCIALHISTORY_GEN_ALL_CORE
former smoker, quit 22 years ago former smoker, quit 22 years ago  denies illicit drug use  drinks 1 cup of Soju (Korean hard liquor)/ week

## 2018-07-09 NOTE — H&P ADULT - HISTORY OF PRESENT ILLNESS
SKELETON   78 Y M Kazakh speaking, former smoker with pmh HTN, HLD, DM, PVD s/p BL SFA stents, CAD s/p MI with multiple PCIs, who presented to his cardiologist Dr. Marie c/o worsening SSCP without radiation with exertion of ____ occurring for ____. Pain resolved with rest. Pt also endorsing associated SOB. Pt also endorsing worsening RLE claudication over the past month with current ET of 2 blocks and resolution with rest.   Pt denies….  NST 6/19/18: small, mild defect in inferior wall that is fixed, suggestive of diaphragmatic attenuation artifact. Myocardial perfusion is normal. EF 70%  Despite normal NST, in light of patient’s risk factors, CCS angina class ___ symptoms, and known CAD pt is now recommended to [facility] for cardiac catheterization with possible intervention if medically indicated.   Cardiac cath @ [facility] 11/18/15: EF 60%, LM 30%, pLAD widely patent stent, mLAD widely patent stents, dLAD mild diffuse disease, pLcx 30%, dLcX widely patent stent, OM1 mild luminal irregularities, OM2 30% with widely patent stent, RCA minor luminal irregularities. History obtained via [employer]  [place] ID [id number]  Please confirm medication list   78 Y M Thai speaking, former smoker with pmh HTN, HLD, DM, PVD s/p BL SFA stents, CAD s/p MI with multiple PCIs, who presented to his cardiologist Dr. Marie c/o worsening SSCP described as throbbing without radiation with exertion of 2-3 blocks. Pain resolved with taking his aspirin 81 mg.  Pt also endorsing associated SOB, dizziness, and palpitations. Pt denies orthopnea, PND, diaphoresis, syncope, LE edema.  Pt also endorsing worsening B/L LE claudication with noticeable hair loss with blue discoloration over the past 7-8 months with current ET of 2 blocks and resolution with rest.  PT denies any ulcers. NST 6/19/18: small, mild defect in inferior wall that is fixed, suggestive of diaphragmatic attenuation artifact. Myocardial perfusion is normal. EF 70%  Despite normal NST, in light of patient’s risk factors, CCS angina class III symptoms, and known CAD pt is now recommended to [facility] for cardiac catheterization with possible intervention if medically indicated.   Cardiac cath @ [facility] 11/18/15: EF 60%, LM 30%, pLAD widely patent stent, mLAD widely patent stents, dLAD mild diffuse disease, pLcx 30%, dLcX widely patent stent, OM1 mild luminal irregularities, OM2 30% with widely patent stent, RCA minor luminal irregularities. History obtained via [place]  [place] [id number]    78 Y M Lithuanian speaking, former smoker with pmh HTN, HLD, DM, PVD s/p BL SFA stents, CAD s/p MI with multiple PCIs, who presented to his cardiologist Dr. Marie c/o worsening SSCP described as throbbing without radiation with exertion of 2-3 blocks. Pain resolved with taking his aspirin 81 mg.  Pt also endorsing associated SOB, dizziness, and palpitations. Pt denies orthopnea, PND, diaphoresis, syncope, LE edema.  Pt also endorsing worsening B/L LE claudication with noticeable hair loss with blue discoloration over the past 7-8 months with current ET of 2 blocks and resolution with rest.  PT denies any ulcers. NST 6/19/18: small, mild defect in inferior wall that is fixed, suggestive of diaphragmatic attenuation artifact. Myocardial perfusion is normal. EF 70%  Despite normal NST, in light of patient’s risk factors, CCS angina class III symptoms, and known CAD pt is now recommended to [facility] for cardiac catheterization with possible intervention and peripheral angiogram if medically indicated.   Cardiac cath @ [facility] 11/18/15: EF 60%, LM 30%, pLAD widely patent stent, mLAD widely patent stents, dLAD mild diffuse disease, pLcx 30%, dLcX widely patent stent, OM1 mild luminal irregularities, OM2 30% with widely patent stent, RCA minor luminal irregularities. History obtained via [employer]  [place] ID [id number] & [id number]    78 Y M Icelandic speaking, former smoker with pmh HTN, HLD, DM, PVD s/p BL SFA stents, CAD s/p MI with multiple PCIs, who presented to his cardiologist Dr. Marie c/o worsening SSCP described as throbbing without radiation with exertion of 2-3 blocks. Pain resolved with taking his aspirin 81 mg.  Pt also endorsing associated SOB, dizziness, and palpitations. Pt denies orthopnea, PND, diaphoresis, syncope, LE edema.  Pt also endorsing worsening B/L LE claudication with noticeable hair loss with blue discoloration over the past 7-8 months with current ET of 2 blocks and resolution with rest.  PT denies any ulcers. NST 6/19/18: small, mild defect in inferior wall that is fixed, suggestive of diaphragmatic attenuation artifact. Myocardial perfusion is normal. EF 70%  In light of patient’s risk factors, CCS angina class III symptoms,  class II [place] sx, and known CAD/PAD pt is now recommended to [facility] for cardiac catheterization  and peripheral angiogram with possible intervention.  Cardiac cath @ [facility] 11/18/15: EF 60%, LM 30%, pLAD widely patent stent, mLAD widely patent stents, dLAD mild diffuse disease, pLcx 30%, dLcX widely patent stent, OM1 mild luminal irregularities, OM2 30% with widely patent stent, RCA minor luminal irregularities.

## 2018-07-09 NOTE — H&P ADULT - PSH
H/O coronary angioplasty    History of appendectomy    History of vocal cord polypectomy    S/P peripheral artery angioplasty  with SFA stents  "I'm not sure"

## 2018-07-09 NOTE — H&P ADULT - ATTENDING COMMENTS
Please see PA notes for full details. I have reviewed the case, examined the patient and agree with plan.  HTN hyperlipidemia CAD PVD s/p PCI.

## 2018-07-09 NOTE — H&P ADULT - ASSESSMENT
78 Y M Slovenian speaking, former smoker with pmh HTN, HLD, DM, PVD s/p BL SFA stents, CAD s/p MI with multiple PCIs, who presented to his cardiologist Dr. Marie c/o worsening SSCP described as throbbing without radiation with exertion of 2-3 blocks. Pt also endorsing worsening B/L LE claudication with noticeable hair loss with blue discoloration over the past 7-8 months with current ET of 2 blocks and resolution with rest. In light of patient’s risk factors, CCS angina class III symptoms and class II [place] sx, and known CAD/PAD pt is now recommended to [facility] for cardiac catheterization with possible intervention and peripheral angiogram.     Pt denies bleeding, GI bleeding, hematemesis, hematuria, BRBPR or melena . Pt reports compliance with ASA/Plavix at home and reports last dose this AM.   IV NS@ 75 cc/hr pre-cath.  Sedation Plan: Moderate  Patient is Suitable Candidate for Sedation: Yes  Risks & benefits of procedure and alternative therapy have been explained to the patient including but not limited to: allergic reaction, bleeding w/possible need for blood transfusion, infection, renal and vascular compromise, limb damage, arrhythmia, stroke, vessel dissection/perforation, Myocardial infarction, emergent CABG. Informed consent obtained and in chart 78 Y M Romansh speaking, former smoker with pmh HTN, HLD, DM, PVD s/p BL SFA stents, CAD s/p MI with multiple PCIs, who presented to his cardiologist Dr. Marie c/o worsening SSCP described as throbbing without radiation with exertion of 2-3 blocks. Pt also endorsing worsening B/L LE claudication with noticeable hair loss with blue discoloration over the past 7-8 months with current ET of 2 blocks and resolution with rest. In light of patient’s risk factors, CCS angina class III symptoms and class II Lauren sx, and known CAD/PAD pt is now recommended to [facility] for cardiac catheterization with possible intervention and peripheral angiogram.   Pt denies bleeding, GI bleeding, hematemesis, hematuria, BRBPR or melena . Pt reports compliance with ASA/Plavix at home and reports last dose this AM.  No need for additional load at this itme.   Cr 0.9. IV NS@ 75 cc/hr pre-cath.  Pre-cath consented using [employer]  # [id number]  Sedation Plan: Moderate  Patient is Suitable Candidate for Sedation: Yes  Risks & benefits of procedure and alternative therapy have been explained to the patient including but not limited to: allergic reaction, bleeding w/possible need for blood transfusion, infection, renal and vascular compromise, limb damage, arrhythmia, stroke, vessel dissection/perforation, Myocardial infarction, emergent CABG. Informed consent obtained and in chart 78 Y M Khmer speaking, former smoker with pmh HTN, HLD, DM, PVD s/p BL SFA stents, CAD s/p MI with multiple PCIs, who presented to his cardiologist Dr. Marie c/o worsening SSCP described as throbbing without radiation with exertion of 2-3 blocks. Pt also endorsing worsening B/L LE claudication with noticeable hair loss with blue discoloration over the past 7-8 months with current ET of 2 blocks and resolution with rest. In light of patient’s risk factors, CCS angina class III symptoms and class II Lauren sx, and known CAD/PAD pt is now recommended to [facility] for cardiac catheterization with possible intervention and peripheral angiogram.     ASA III, Mallampati III  Pt denies bleeding, GI bleeding, hematemesis, hematuria, BRBPR or melena . Pt reports compliance with ASA/Plavix at home and reports last dose this AM.  No need for additional load at this itme.   Cr 0.9. IV NS@ 75 cc/hr pre-cath.  Pre-cath consented using [employer]  # [id number]  Sedation Plan: Moderate  Patient is Suitable Candidate for Sedation: Yes  Risks & benefits of procedure and alternative therapy have been explained to the patient including but not limited to: allergic reaction, bleeding w/possible need for blood transfusion, infection, renal and vascular compromise, limb damage, arrhythmia, stroke, vessel dissection/perforation, Myocardial infarction, emergent CABG. Informed consent obtained and in chart

## 2018-07-10 ENCOUNTER — INPATIENT (INPATIENT)
Facility: HOSPITAL | Age: 78
LOS: 0 days | Discharge: HOME | DRG: 251 | End: 2018-07-11
Attending: INTERNAL MEDICINE | Admitting: INTERNAL MEDICINE
Payer: COMMERCIAL

## 2018-07-10 DIAGNOSIS — Z98.61 CORONARY ANGIOPLASTY STATUS: Chronic | ICD-10-CM

## 2018-07-10 DIAGNOSIS — Z98.89 OTHER SPECIFIED POSTPROCEDURAL STATES: Chronic | ICD-10-CM

## 2018-07-10 DIAGNOSIS — Z98.890 OTHER SPECIFIED POSTPROCEDURAL STATES: Chronic | ICD-10-CM

## 2018-07-10 LAB
ANION GAP SERPL CALC-SCNC: 15 MMOL/L — SIGNIFICANT CHANGE UP (ref 5–17)
APTT BLD: 32.1 SEC — SIGNIFICANT CHANGE UP (ref 27.5–37.4)
BASOPHILS NFR BLD AUTO: 0.8 % — SIGNIFICANT CHANGE UP (ref 0–2)
BUN SERPL-MCNC: 21 MG/DL — SIGNIFICANT CHANGE UP (ref 7–23)
CALCIUM SERPL-MCNC: 9.2 MG/DL — SIGNIFICANT CHANGE UP (ref 8.4–10.5)
CHLORIDE SERPL-SCNC: 101 MMOL/L — SIGNIFICANT CHANGE UP (ref 96–108)
CHOLEST SERPL-MCNC: 140 MG/DL — SIGNIFICANT CHANGE UP (ref 10–199)
CK MB CFR SERPL CALC: 1.8 NG/ML — SIGNIFICANT CHANGE UP (ref 0–6.7)
CO2 SERPL-SCNC: 24 MMOL/L — SIGNIFICANT CHANGE UP (ref 22–31)
CREAT SERPL-MCNC: 0.98 MG/DL — SIGNIFICANT CHANGE UP (ref 0.5–1.3)
CRP SERPL-MCNC: 0.4 MG/DL — SIGNIFICANT CHANGE UP (ref 0–0.4)
EOSINOPHIL NFR BLD AUTO: 1.5 % — SIGNIFICANT CHANGE UP (ref 0–6)
GLUCOSE SERPL-MCNC: 152 MG/DL — HIGH (ref 70–99)
HBA1C BLD-MCNC: 7.1 % — HIGH (ref 4–5.6)
HCT VFR BLD CALC: 36.8 % — LOW (ref 39–50)
HDLC SERPL-MCNC: 51 MG/DL — SIGNIFICANT CHANGE UP (ref 40–125)
HGB BLD-MCNC: 12.2 G/DL — LOW (ref 13–17)
INR BLD: 0.98 — SIGNIFICANT CHANGE UP (ref 0.88–1.16)
LIPID PNL WITH DIRECT LDL SERPL: 67 MG/DL — SIGNIFICANT CHANGE UP
LYMPHOCYTES # BLD AUTO: 34.1 % — SIGNIFICANT CHANGE UP (ref 13–44)
MCHC RBC-ENTMCNC: 29.1 PG — SIGNIFICANT CHANGE UP (ref 27–34)
MCHC RBC-ENTMCNC: 33.2 G/DL — SIGNIFICANT CHANGE UP (ref 32–36)
MCV RBC AUTO: 87.8 FL — SIGNIFICANT CHANGE UP (ref 80–100)
MONOCYTES NFR BLD AUTO: 6.2 % — SIGNIFICANT CHANGE UP (ref 2–14)
NEUTROPHILS NFR BLD AUTO: 57.4 % — SIGNIFICANT CHANGE UP (ref 43–77)
PLATELET # BLD AUTO: 123 K/UL — LOW (ref 150–400)
POTASSIUM SERPL-MCNC: 4 MMOL/L — SIGNIFICANT CHANGE UP (ref 3.5–5.3)
POTASSIUM SERPL-SCNC: 4 MMOL/L — SIGNIFICANT CHANGE UP (ref 3.5–5.3)
PROTHROM AB SERPL-ACNC: 10.9 SEC — SIGNIFICANT CHANGE UP (ref 9.8–12.7)
RBC # BLD: 4.19 M/UL — LOW (ref 4.2–5.8)
RBC # FLD: 13.6 % — SIGNIFICANT CHANGE UP (ref 10.3–16.9)
SODIUM SERPL-SCNC: 140 MMOL/L — SIGNIFICANT CHANGE UP (ref 135–145)
TOTAL CHOLESTEROL/HDL RATIO MEASUREMENT: 2.7 RATIO — LOW (ref 3.4–9.6)
TRIGL SERPL-MCNC: 109 MG/DL — SIGNIFICANT CHANGE UP (ref 10–149)
WBC # BLD: 5.2 K/UL — SIGNIFICANT CHANGE UP (ref 3.8–10.5)
WBC # FLD AUTO: 5.2 K/UL — SIGNIFICANT CHANGE UP (ref 3.8–10.5)

## 2018-07-10 PROCEDURE — 93010 ELECTROCARDIOGRAM REPORT: CPT

## 2018-07-10 PROCEDURE — 92920 PRQ TRLUML C ANGIOP 1ART&/BR: CPT | Mod: LC

## 2018-07-10 PROCEDURE — 99222 1ST HOSP IP/OBS MODERATE 55: CPT

## 2018-07-10 PROCEDURE — 93458 L HRT ARTERY/VENTRICLE ANGIO: CPT | Mod: 26,XU

## 2018-07-10 RX ORDER — LORATADINE 10 MG/1
10 TABLET ORAL DAILY
Qty: 0 | Refills: 0 | Status: DISCONTINUED | OUTPATIENT
Start: 2018-07-10 | End: 2018-07-11

## 2018-07-10 RX ORDER — SENNA PLUS 8.6 MG/1
2 TABLET ORAL
Qty: 0 | Refills: 0 | Status: DISCONTINUED | OUTPATIENT
Start: 2018-07-10 | End: 2018-07-11

## 2018-07-10 RX ORDER — ASPIRIN/CALCIUM CARB/MAGNESIUM 324 MG
81 TABLET ORAL DAILY
Qty: 0 | Refills: 0 | Status: DISCONTINUED | OUTPATIENT
Start: 2018-07-10 | End: 2018-07-11

## 2018-07-10 RX ORDER — CHLORHEXIDINE GLUCONATE 213 G/1000ML
1 SOLUTION TOPICAL ONCE
Qty: 0 | Refills: 0 | Status: DISCONTINUED | OUTPATIENT
Start: 2018-07-10 | End: 2018-07-10

## 2018-07-10 RX ORDER — ATENOLOL 25 MG/1
25 TABLET ORAL DAILY
Qty: 0 | Refills: 0 | Status: DISCONTINUED | OUTPATIENT
Start: 2018-07-11 | End: 2018-07-11

## 2018-07-10 RX ORDER — AMOXICILLIN 250 MG/5ML
1 SUSPENSION, RECONSTITUTED, ORAL (ML) ORAL
Qty: 0 | Refills: 0 | COMMUNITY

## 2018-07-10 RX ORDER — CLOPIDOGREL BISULFATE 75 MG/1
75 TABLET, FILM COATED ORAL DAILY
Qty: 0 | Refills: 0 | Status: DISCONTINUED | OUTPATIENT
Start: 2018-07-10 | End: 2018-07-11

## 2018-07-10 RX ORDER — ATORVASTATIN CALCIUM 80 MG/1
40 TABLET, FILM COATED ORAL AT BEDTIME
Qty: 0 | Refills: 0 | Status: DISCONTINUED | OUTPATIENT
Start: 2018-07-10 | End: 2018-07-11

## 2018-07-10 RX ORDER — SODIUM CHLORIDE 9 MG/ML
500 INJECTION INTRAMUSCULAR; INTRAVENOUS; SUBCUTANEOUS
Qty: 0 | Refills: 0 | Status: COMPLETED | OUTPATIENT
Start: 2018-07-10 | End: 2018-07-10

## 2018-07-10 RX ORDER — MELOXICAM 15 MG/1
1 TABLET ORAL
Qty: 0 | Refills: 0 | COMMUNITY

## 2018-07-10 RX ORDER — LOSARTAN POTASSIUM 100 MG/1
50 TABLET, FILM COATED ORAL DAILY
Qty: 0 | Refills: 0 | Status: DISCONTINUED | OUTPATIENT
Start: 2018-07-11 | End: 2018-07-11

## 2018-07-10 RX ORDER — PRASUGREL 5 MG/1
1 TABLET, FILM COATED ORAL
Qty: 0 | Refills: 0 | COMMUNITY

## 2018-07-10 RX ORDER — RANITIDINE HYDROCHLORIDE 150 MG/1
1 TABLET, FILM COATED ORAL
Qty: 0 | Refills: 0 | COMMUNITY

## 2018-07-10 RX ORDER — CILOSTAZOL 100 MG/1
100 TABLET ORAL
Qty: 0 | Refills: 0 | Status: DISCONTINUED | OUTPATIENT
Start: 2018-07-10 | End: 2018-07-11

## 2018-07-10 RX ORDER — LIPASE/PROTEASE/AMYLASE 16-48-48K
3 CAPSULE,DELAYED RELEASE (ENTERIC COATED) ORAL THREE TIMES A DAY
Qty: 0 | Refills: 0 | Status: DISCONTINUED | OUTPATIENT
Start: 2018-07-10 | End: 2018-07-11

## 2018-07-10 RX ORDER — TAMSULOSIN HYDROCHLORIDE 0.4 MG/1
0.4 CAPSULE ORAL AT BEDTIME
Qty: 0 | Refills: 0 | Status: DISCONTINUED | OUTPATIENT
Start: 2018-07-10 | End: 2018-07-11

## 2018-07-10 RX ORDER — DEXTROSE 50 % IN WATER 50 %
25 SYRINGE (ML) INTRAVENOUS ONCE
Qty: 0 | Refills: 0 | Status: DISCONTINUED | OUTPATIENT
Start: 2018-07-10 | End: 2018-07-11

## 2018-07-10 RX ORDER — PANTOPRAZOLE SODIUM 20 MG/1
40 TABLET, DELAYED RELEASE ORAL
Qty: 0 | Refills: 0 | Status: DISCONTINUED | OUTPATIENT
Start: 2018-07-10 | End: 2018-07-11

## 2018-07-10 RX ORDER — OMEPRAZOLE 10 MG/1
1 CAPSULE, DELAYED RELEASE ORAL
Qty: 0 | Refills: 0 | COMMUNITY

## 2018-07-10 RX ORDER — DEXTROSE 50 % IN WATER 50 %
12.5 SYRINGE (ML) INTRAVENOUS ONCE
Qty: 0 | Refills: 0 | Status: DISCONTINUED | OUTPATIENT
Start: 2018-07-10 | End: 2018-07-11

## 2018-07-10 RX ORDER — GLUCAGON INJECTION, SOLUTION 0.5 MG/.1ML
1 INJECTION, SOLUTION SUBCUTANEOUS ONCE
Qty: 0 | Refills: 0 | Status: DISCONTINUED | OUTPATIENT
Start: 2018-07-10 | End: 2018-07-11

## 2018-07-10 RX ORDER — DEXTROSE 50 % IN WATER 50 %
15 SYRINGE (ML) INTRAVENOUS ONCE
Qty: 0 | Refills: 0 | Status: DISCONTINUED | OUTPATIENT
Start: 2018-07-10 | End: 2018-07-11

## 2018-07-10 RX ORDER — MULTIVIT-MIN/FERROUS GLUCONATE 9 MG/15 ML
1 LIQUID (ML) ORAL
Qty: 0 | Refills: 0 | COMMUNITY

## 2018-07-10 RX ORDER — LIPASE/PROTEASE/AMYLASE 16-48-48K
1 CAPSULE,DELAYED RELEASE (ENTERIC COATED) ORAL
Qty: 0 | Refills: 0 | Status: DISCONTINUED | OUTPATIENT
Start: 2018-07-10 | End: 2018-07-10

## 2018-07-10 RX ORDER — SODIUM CHLORIDE 9 MG/ML
1000 INJECTION, SOLUTION INTRAVENOUS
Qty: 0 | Refills: 0 | Status: DISCONTINUED | OUTPATIENT
Start: 2018-07-10 | End: 2018-07-11

## 2018-07-10 RX ORDER — INSULIN LISPRO 100/ML
VIAL (ML) SUBCUTANEOUS
Qty: 0 | Refills: 0 | Status: DISCONTINUED | OUTPATIENT
Start: 2018-07-10 | End: 2018-07-11

## 2018-07-10 RX ADMIN — SODIUM CHLORIDE 75 MILLILITER(S): 9 INJECTION INTRAMUSCULAR; INTRAVENOUS; SUBCUTANEOUS at 18:13

## 2018-07-10 RX ADMIN — Medication 3 CAPSULE(S): at 21:18

## 2018-07-10 RX ADMIN — ATORVASTATIN CALCIUM 40 MILLIGRAM(S): 80 TABLET, FILM COATED ORAL at 21:17

## 2018-07-10 RX ADMIN — CILOSTAZOL 100 MILLIGRAM(S): 100 TABLET ORAL at 18:12

## 2018-07-10 RX ADMIN — Medication 2: at 21:18

## 2018-07-10 RX ADMIN — Medication 1 DROP(S): at 18:12

## 2018-07-10 RX ADMIN — SENNA PLUS 2 TABLET(S): 8.6 TABLET ORAL at 18:12

## 2018-07-10 NOTE — PROGRESS NOTE ADULT - SUBJECTIVE AND OBJECTIVE BOX
Interventional Cardiology PA Sheath Pull Note    Pt without complaints. VSS      Pre-Sheath Removal:    [x ] Right     [ ] Left          [x ] Groin    [ ] Brachial    [ ] 5Fr      [ ] 6Fr    [x ] 7Fr     [ ] 8Fr    [x ] Arterial  [ ] Venous sheath in place    [no ] Hematoma        [no ] Bleed    Pulses: 2+    [x ] Right     [ ] Left       DP:  [ ]  Doppler   [ ]  Faint    [ ]   1+    [x ] 2+       Hemostasis Achieved with:      20           minutes manual pressure    Vasovagal Reaction: No    Meds Given:      Post-Sheath Removal:    [x ] Right     [ ] Left          [x ] Groin    [ ] Brachial    [no ] Hematoma        [no ] Bleed       [no ] Bruit    Pulses:    [x ] Right     [ ] Left       DP:  [ ]  Doppler   [ ]  Faint    [ ]   1+    [x ] 2+     A/P:  s/p [ ] Dx Cath      [ ] IVUS/FFR     [ ] PTA/STENT       [x ] PTCA/STENT    -Continue bedrest (pt given instructions)  -Continue to monitor

## 2018-07-10 NOTE — PROGRESS NOTE ADULT - SUBJECTIVE AND OBJECTIVE BOX
Interventional Cardiology Radial band Removal Note    Pt without complaints.  VSS.    Right Radial access site Radar Hemoband in place, no hematoma, no bleed  Radial pulse: 2+    Hemostasis achieved with manual release of hemoband.    No  Vasovagal reaction.    Meds given: None    Right Radial access site  no hematoma, no bleed  Radial pulse: 2+    A/P:  s/p PTCA/Stent   -	continue to monitor  -	-OOB as tolerated  -	Post Procedure Instructions given  -                   Call [phone] if any access site issues.

## 2018-07-11 ENCOUNTER — TRANSCRIPTION ENCOUNTER (OUTPATIENT)
Age: 78
End: 2018-07-11

## 2018-07-11 VITALS — TEMPERATURE: 96 F

## 2018-07-11 LAB
ANION GAP SERPL CALC-SCNC: 15 MMOL/L — SIGNIFICANT CHANGE UP (ref 5–17)
BUN SERPL-MCNC: 15 MG/DL — SIGNIFICANT CHANGE UP (ref 7–23)
CALCIUM SERPL-MCNC: 9.4 MG/DL — SIGNIFICANT CHANGE UP (ref 8.4–10.5)
CHLORIDE SERPL-SCNC: 100 MMOL/L — SIGNIFICANT CHANGE UP (ref 96–108)
CO2 SERPL-SCNC: 25 MMOL/L — SIGNIFICANT CHANGE UP (ref 22–31)
CREAT SERPL-MCNC: 0.94 MG/DL — SIGNIFICANT CHANGE UP (ref 0.5–1.3)
GLUCOSE SERPL-MCNC: 153 MG/DL — HIGH (ref 70–99)
HCT VFR BLD CALC: 38.9 % — LOW (ref 39–50)
HGB BLD-MCNC: 13 G/DL — SIGNIFICANT CHANGE UP (ref 13–17)
MAGNESIUM SERPL-MCNC: 2 MG/DL — SIGNIFICANT CHANGE UP (ref 1.6–2.6)
MCHC RBC-ENTMCNC: 29 PG — SIGNIFICANT CHANGE UP (ref 27–34)
MCHC RBC-ENTMCNC: 33.4 G/DL — SIGNIFICANT CHANGE UP (ref 32–36)
MCV RBC AUTO: 86.6 FL — SIGNIFICANT CHANGE UP (ref 80–100)
PLATELET # BLD AUTO: 124 K/UL — LOW (ref 150–400)
POTASSIUM SERPL-MCNC: 4.3 MMOL/L — SIGNIFICANT CHANGE UP (ref 3.5–5.3)
POTASSIUM SERPL-SCNC: 4.3 MMOL/L — SIGNIFICANT CHANGE UP (ref 3.5–5.3)
RBC # BLD: 4.49 M/UL — SIGNIFICANT CHANGE UP (ref 4.2–5.8)
RBC # FLD: 13.5 % — SIGNIFICANT CHANGE UP (ref 10.3–16.9)
SODIUM SERPL-SCNC: 140 MMOL/L — SIGNIFICANT CHANGE UP (ref 135–145)
WBC # BLD: 6.4 K/UL — SIGNIFICANT CHANGE UP (ref 3.8–10.5)
WBC # FLD AUTO: 6.4 K/UL — SIGNIFICANT CHANGE UP (ref 3.8–10.5)

## 2018-07-11 PROCEDURE — 82962 GLUCOSE BLOOD TEST: CPT

## 2018-07-11 PROCEDURE — C1887: CPT

## 2018-07-11 PROCEDURE — 36415 COLL VENOUS BLD VENIPUNCTURE: CPT

## 2018-07-11 PROCEDURE — 80048 BASIC METABOLIC PNL TOTAL CA: CPT

## 2018-07-11 PROCEDURE — 85610 PROTHROMBIN TIME: CPT

## 2018-07-11 PROCEDURE — 83735 ASSAY OF MAGNESIUM: CPT

## 2018-07-11 PROCEDURE — 99231 SBSQ HOSP IP/OBS SF/LOW 25: CPT

## 2018-07-11 PROCEDURE — C1725: CPT

## 2018-07-11 PROCEDURE — 92928 PRQ TCAT PLMT NTRAC ST 1 LES: CPT

## 2018-07-11 PROCEDURE — 93458 L HRT ARTERY/VENTRICLE ANGIO: CPT

## 2018-07-11 PROCEDURE — 85025 COMPLETE CBC W/AUTO DIFF WBC: CPT

## 2018-07-11 PROCEDURE — 82550 ASSAY OF CK (CPK): CPT

## 2018-07-11 PROCEDURE — 93005 ELECTROCARDIOGRAM TRACING: CPT

## 2018-07-11 PROCEDURE — 85027 COMPLETE CBC AUTOMATED: CPT

## 2018-07-11 PROCEDURE — 83036 HEMOGLOBIN GLYCOSYLATED A1C: CPT

## 2018-07-11 PROCEDURE — C1769: CPT

## 2018-07-11 PROCEDURE — 85730 THROMBOPLASTIN TIME PARTIAL: CPT

## 2018-07-11 PROCEDURE — 99238 HOSP IP/OBS DSCHRG MGMT 30/<: CPT

## 2018-07-11 PROCEDURE — 82553 CREATINE MB FRACTION: CPT

## 2018-07-11 PROCEDURE — 80061 LIPID PANEL: CPT

## 2018-07-11 PROCEDURE — 86140 C-REACTIVE PROTEIN: CPT

## 2018-07-11 RX ORDER — ATORVASTATIN CALCIUM 80 MG/1
1 TABLET, FILM COATED ORAL
Qty: 0 | Refills: 0 | COMMUNITY

## 2018-07-11 RX ORDER — MELOXICAM 15 MG/1
1 TABLET ORAL
Qty: 0 | Refills: 0 | COMMUNITY

## 2018-07-11 RX ORDER — ATORVASTATIN CALCIUM 80 MG/1
1 TABLET, FILM COATED ORAL
Qty: 30 | Refills: 3
Start: 2018-07-11 | End: 2022-08-22

## 2018-07-11 RX ORDER — ATORVASTATIN CALCIUM 80 MG/1
1 TABLET, FILM COATED ORAL
Qty: 30 | Refills: 3
Start: 2018-07-11 | End: 2018-11-07

## 2018-07-11 RX ADMIN — ATENOLOL 25 MILLIGRAM(S): 25 TABLET ORAL at 05:48

## 2018-07-11 RX ADMIN — Medication 1 DROP(S): at 05:47

## 2018-07-11 RX ADMIN — Medication 3 CAPSULE(S): at 05:46

## 2018-07-11 RX ADMIN — PANTOPRAZOLE SODIUM 40 MILLIGRAM(S): 20 TABLET, DELAYED RELEASE ORAL at 05:46

## 2018-07-11 RX ADMIN — LOSARTAN POTASSIUM 50 MILLIGRAM(S): 100 TABLET, FILM COATED ORAL at 05:48

## 2018-07-11 RX ADMIN — Medication 1 TABLET(S): at 11:07

## 2018-07-11 RX ADMIN — Medication 81 MILLIGRAM(S): at 11:07

## 2018-07-11 RX ADMIN — Medication 1: at 11:08

## 2018-07-11 RX ADMIN — Medication 3 CAPSULE(S): at 11:08

## 2018-07-11 RX ADMIN — CILOSTAZOL 100 MILLIGRAM(S): 100 TABLET ORAL at 05:46

## 2018-07-11 RX ADMIN — CLOPIDOGREL BISULFATE 75 MILLIGRAM(S): 75 TABLET, FILM COATED ORAL at 11:07

## 2018-07-11 NOTE — DISCHARGE NOTE ADULT - CARE PROVIDERS DIRECT ADDRESSES
[email or web address]

## 2018-07-11 NOTE — DISCHARGE NOTE ADULT - MEDICATION SUMMARY - MEDICATIONS TO TAKE
I will START or STAY ON the medications listed below when I get home from the hospital:    aspirin 81 mg oral tablet  -- 1 tab(s) by mouth once a day in am  -- Indication: For Coronary artery disease, keeps stent open    losartan 50 mg oral tablet  -- 1 tab(s) by mouth once a day in am  -- Indication: For High blood pressure, protects heart    tamsulosin 0.4 mg oral capsule  -- 1 cap(s) by mouth once a day in pm  -- Indication: For BPH    glipiZIDE 10 mg oral tablet, extended release  -- 1 tab(s) by mouth once a day in am  -- Indication: For diabetes    Tradjenta 5 mg oral tablet  -- 1 tab(s) by mouth once a day AM  -- Indication: For diabetes    loratadine 10 mg oral tablet  -- 1 tab(s) by mouth once a day  -- Indication: For allergies    atorvastatin 40 mg oral tablet  -- 1 tab(s) by mouth once a day (at bedtime)  -- Indication: For High cholesterol    Vascepa 1 g oral capsule  -- 2 cap(s) by mouth 2 times a day  -- Indication: For High cholesterol    Plavix 75 mg oral tablet  -- 1 tab(s) by mouth once a day  -- Indication: For Coronary artery disease, keeps stent open    atenolol 25 mg oral tablet  -- 1 tab(s) by mouth once a day in am  -- Indication: For High blood pressure, protects heart    Creon 36,000 units oral delayed release capsule  -- 1 cap(s) by mouth 3 times a day  -- Indication: For supplement    Senna 8.6 mg oral tablet  -- 2 tab(s) by mouth 2 times a day  -- Indication: For as needed for constipation    cilostazol 100 mg oral tablet  -- 1 tab(s) by mouth 2 times a day/ last dose 01/29/18  -- Indication: For prevents peripheral nerve pain    Restasis 0.05% ophthalmic emulsion  -- 1 drop(s) to each affected eye every 12 hours  -- Indication: For as prescribed    Systane ophthalmic solution  -- 1 drop(s) to each affected eye 2 times a day  -- Indication: For as prescribed    Dexilant 60 mg oral delayed release capsule  -- 1 cap(s) by mouth once a day in the am  -- Indication: For prevents gastric reflux    Multiple Vitamins oral tablet  -- 1 tab(s) by mouth once a day  -- Indication: For supplement

## 2018-07-11 NOTE — DISCHARGE NOTE ADULT - MEDICATION SUMMARY - MEDICATIONS TO CHANGE
I will SWITCH the dose or number of times a day I take the medications listed below when I get home from the hospital:    atorvastatin 10 mg oral tablet  -- 1 tab(s) by mouth once a day in the pm

## 2018-07-11 NOTE — DISCHARGE NOTE ADULT - HOSPITAL COURSE
79 yo M German speaking, former smoker with PMHx of HTN, HLD, DM, PVD s/p BL SFA stents, CAD s/p MI with multiple PCIs, who presented to his cardiologist Dr. Marie c/o worsening SSCP described as throbbing without radiation with exertion of 2-3 blocks. Pain resolved with taking his aspirin 81 mg.  Pt also endorsing associated SOB, dizziness, and palpitations. Pt denies orthopnea, PND, diaphoresis, syncope, LE edema.  Pt also endorsing worsening B/L LE claudication with noticeable hair loss with blue discoloration over the past 7-8 months with current ET of 2 blocks and resolution with rest.  PT denies any ulcers. NST 6/19/18: small, mild defect in inferior wall that is fixed, suggestive of diaphragmatic attenuation artifact. Myocardial perfusion is normal. EF 70%. In light of patient’s risk factors, CCS angina class III symptoms, class II [place] sx, and known CAD/PAD pt was recommended to [facility] for cardiac catheterization  and peripheral angiogram with possible intervention. Patient underwent cardiac cath on 7/10 and received PTCA OM2, LSFA 60% ISR, RSFA stent patent, EF 55%, right radial and right groin access. Dr. Go was consulted for peripheral disease post procedure...    Patient was seen and examined this AM and was asymptomatic without complaints. Patient's VSS, labs and tele reviewed. Patient's lipitor increased from 10mg to 40mg for aggressive cholesterol management given ISR. Patient is stable for discharge per Dr. Clemente. Patient has been given appropriate discharge instructions including medication regimen, access site management and follow up. Patient's meds have been e-prescribed to his preferred pharmacy.    Temp 97.1F, HR 77bpm, /70, RR 18, O2 sat 98% RA  Gen: NAD, A&O x 3  Cards: RRR, clear S1 and S2 without murmur  Pulm: CTA, without w/r/r  Right wrist: no hematoma or ooze, radial pulse 2+, good cap refill  Abd: BS x 4, soft, NT  Right groin: no hematoma or ooze, femoral pulse 2+ without bruit, DP/PT 1+  Ext: No LE edema or ulcerations b/l 77 yo M Divehi speaking, former smoker with PMHx of HTN, HLD, DM, PVD s/p BL SFA stents, CAD s/p MI with multiple PCIs, who presented to his cardiologist Dr. Marie c/o worsening SSCP described as throbbing without radiation with exertion of 2-3 blocks. Pain resolved with taking his aspirin 81 mg.  Pt also endorsing associated SOB, dizziness, and palpitations. Pt denies orthopnea, PND, diaphoresis, syncope, LE edema.  Pt also endorsing worsening B/L LE claudication with noticeable hair loss with blue discoloration over the past 7-8 months with current ET of 2 blocks and resolution with rest.  PT denies any ulcers. NST 6/19/18: small, mild defect in inferior wall that is fixed, suggestive of diaphragmatic attenuation artifact. Myocardial perfusion is normal. EF 70%. In light of patient’s risk factors, CCS angina class III symptoms, class II [place] sx, and known CAD/PAD pt was recommended to [facility] for cardiac catheterization  and peripheral angiogram with possible intervention. Patient underwent cardiac cath on 7/10 and received PTCA OM2, LSFA 60% ISR, RSFA stent patent, EF 55%, right radial and right groin access. Dr. Go was consulted for peripheral disease post procedure and states that disease is not significant enough to require intervention. He will make Dr. Steele aware of his assessment. Patient was seen and examined this AM and was asymptomatic without complaints. Patient's VSS, labs and tele reviewed. Patient's lipitor increased from 10mg to 40mg for aggressive cholesterol management given ISR. Patient is stable for discharge per Dr. Clemente. Patient has been given appropriate discharge instructions including medication regimen, access site management and follow up. Patient's meds have been e-prescribed to his preferred pharmacy.    Temp 97.1F, HR 77bpm, /70, RR 18, O2 sat 98% RA  Gen: NAD, A&O x 3  Cards: RRR, clear S1 and S2 without murmur  Pulm: CTA, without w/r/r  Right wrist: no hematoma or ooze, radial pulse 2+, good cap refill  Abd: BS x 4, soft, NT  Right groin: no hematoma or ooze, femoral pulse 2+ without bruit, DP/PT 1+  Ext: No LE edema or ulcerations b/l

## 2018-07-11 NOTE — DISCHARGE NOTE ADULT - CARE PLAN
Principal Discharge DX:	CAD (coronary artery disease)  Goal:	continue medications, follow up  Assessment and plan of treatment:	You underwent a cardiac angiogram and received a balloon to the second ostial marginal artery. PLEASE CONTINUE ASPIRIN 81MG DAILY AND PLAVIX 75MG DAILY. DO NOT STOP THESE MEDICATIONS FOR ANY REASON AS THEY ARE KEEPING YOUR STENT OPEN AND PREVENTING A HEART ATTACK. Avoid strenuous activity or heavy lifting for the next five days. Do not take a bath or swim for the next five days; you may shower. For any bleeding or hematoma formation (hardened blood collection under the skin) at the access sites of right wrist and right groin please hold pressure and go to the emergency room. Please follow up with Dr. Marie in 1-2 weeks. For recurrent chest pain, please call your doctor or go to the emergency room.  Secondary Diagnosis:	DM (diabetes mellitus)  Goal:	continue medications  Assessment and plan of treatment:	Please continue medications as listed for diabetes. Maintain a low carbohydrate, low sugar diet.  Secondary Diagnosis:	HLD (hyperlipidemia)  Goal:	continue increased dose of lipitor (atorvastatin)  Assessment and plan of treatment:	Please continue increased dose of lipitor (atorvastatin) 40mg daily at bedtime to keep your cholesterol low. High cholesterol contributes to heart disease.  Secondary Diagnosis:	HTN (hypertension)  Goal:	continue medications  Assessment and plan of treatment:	Please continue medications as listed to keep your blood pressure controlled. For blood pressure that is too high or too low please see your doctor or go to the emergency room as necessary.  Secondary Diagnosis:	PVD (peripheral vascular disease) Principal Discharge DX:	CAD (coronary artery disease)  Goal:	continue medications, follow up  Assessment and plan of treatment:	You underwent a cardiac angiogram and received a balloon to the second ostial marginal artery. PLEASE CONTINUE ASPIRIN 81MG DAILY AND PLAVIX 75MG DAILY. DO NOT STOP THESE MEDICATIONS FOR ANY REASON AS THEY ARE KEEPING YOUR STENT OPEN AND PREVENTING A HEART ATTACK. Avoid strenuous activity or heavy lifting for the next five days. Do not take a bath or swim for the next five days; you may shower. For any bleeding or hematoma formation (hardened blood collection under the skin) at the access sites of right wrist and right groin please hold pressure and go to the emergency room. Please follow up with Dr. Marie in 1-2 weeks. For recurrent chest pain, please call your doctor or go to the emergency room.  Secondary Diagnosis:	DM (diabetes mellitus)  Goal:	continue medications  Assessment and plan of treatment:	Please continue medications as listed for diabetes. Maintain a low carbohydrate, low sugar diet.  Secondary Diagnosis:	HLD (hyperlipidemia)  Goal:	continue increased dose of lipitor (atorvastatin)  Assessment and plan of treatment:	Please continue increased dose of lipitor (atorvastatin) 40mg daily at bedtime to keep your cholesterol low. High cholesterol contributes to heart disease.  Secondary Diagnosis:	HTN (hypertension)  Goal:	continue medications  Assessment and plan of treatment:	Please continue medications as listed to keep your blood pressure controlled. For blood pressure that is too high or too low please see your doctor or go to the emergency room as necessary.  Secondary Diagnosis:	PVD (peripheral vascular disease)  Goal:	follow up  Assessment and plan of treatment:	The images of your legs were reviewed by a peripheral interventionalist and you do not have any significant blockages in the legs requiring intervention at this time. Please follow up with Dr. Marie for medical management of your peripheral arterial disease.

## 2018-07-11 NOTE — DISCHARGE NOTE ADULT - CARE PROVIDER_API CALL
Gabriel Marie), Cardiovascular Disease; Internal Medicine  [address] [place] [address]  Phone: [phone]  Fax: [phone]

## 2018-07-11 NOTE — DISCHARGE NOTE ADULT - PATIENT PORTAL LINK FT
You can access the [facility] Patient Portal, offered by [facility], by registering with the following website: [email or web address]

## 2018-07-11 NOTE — PROGRESS NOTE ADULT - SUBJECTIVE AND OBJECTIVE BOX
INTERVENTIONAL CARDIOLOGY FOLLOW UP NOTE    -Patient seen and examined this am  -No events overnight  -No complaints this am    VASCULAR ACCESS EXAM:     FEMORAL:    2+ right/ femoral pulse  2+ DP/PT pulses.  -Access site clean, non-tender, without ecchymosis or hematoma.    A/P  77 yo M with PMH CAD/DM/HTN/PAD now s/p PTCA of the OM2 ISR  via femoral approach with no evidence of vascular complications post procedure.     -continue with current medications including dual antiplatelet therapy   -discharge instructions as per protocol   - Follow up  for the PAD(ISR of left SFA stent)  -outpatient follow up with primary cardiologist

## 2018-07-11 NOTE — DISCHARGE NOTE ADULT - PLAN OF CARE
continue medications, follow up You underwent a cardiac angiogram and received a balloon to the second ostial marginal artery. PLEASE CONTINUE ASPIRIN 81MG DAILY AND PLAVIX 75MG DAILY. DO NOT STOP THESE MEDICATIONS FOR ANY REASON AS THEY ARE KEEPING YOUR STENT OPEN AND PREVENTING A HEART ATTACK. Avoid strenuous activity or heavy lifting for the next five days. Do not take a bath or swim for the next five days; you may shower. For any bleeding or hematoma formation (hardened blood collection under the skin) at the access sites of right wrist and right groin please hold pressure and go to the emergency room. Please follow up with Dr. Marie in 1-2 weeks. For recurrent chest pain, please call your doctor or go to the emergency room. continue medications Please continue medications as listed for diabetes. Maintain a low carbohydrate, low sugar diet. continue increased dose of lipitor (atorvastatin) Please continue increased dose of lipitor (atorvastatin) 40mg daily at bedtime to keep your cholesterol low. High cholesterol contributes to heart disease. Please continue medications as listed to keep your blood pressure controlled. For blood pressure that is too high or too low please see your doctor or go to the emergency room as necessary. follow up The images of your legs were reviewed by a peripheral interventionalist and you do not have any significant blockages in the legs requiring intervention at this time. Please follow up with Dr. Marie for medical management of your peripheral arterial disease.

## 2018-07-16 DIAGNOSIS — E11.51 TYPE 2 DIABETES MELLITUS WITH DIABETIC PERIPHERAL ANGIOPATHY WITHOUT GANGRENE: ICD-10-CM

## 2018-07-16 DIAGNOSIS — Z79.84 LONG TERM (CURRENT) USE OF ORAL HYPOGLYCEMIC DRUGS: ICD-10-CM

## 2018-07-16 DIAGNOSIS — I10 ESSENTIAL (PRIMARY) HYPERTENSION: ICD-10-CM

## 2018-07-16 DIAGNOSIS — Z87.891 PERSONAL HISTORY OF NICOTINE DEPENDENCE: ICD-10-CM

## 2018-07-16 DIAGNOSIS — I25.10 ATHEROSCLEROTIC HEART DISEASE OF NATIVE CORONARY ARTERY WITHOUT ANGINA PECTORIS: ICD-10-CM

## 2018-07-16 DIAGNOSIS — Z95.5 PRESENCE OF CORONARY ANGIOPLASTY IMPLANT AND GRAFT: ICD-10-CM

## 2018-07-16 DIAGNOSIS — E78.5 HYPERLIPIDEMIA, UNSPECIFIED: ICD-10-CM

## 2018-07-16 DIAGNOSIS — Z79.82 LONG TERM (CURRENT) USE OF ASPIRIN: ICD-10-CM

## 2018-07-16 DIAGNOSIS — Z88.1 ALLERGY STATUS TO OTHER ANTIBIOTIC AGENTS STATUS: ICD-10-CM

## 2018-07-25 ENCOUNTER — APPOINTMENT (OUTPATIENT)
Dept: CARDIOLOGY | Facility: CLINIC | Age: 78
End: 2018-07-25
Payer: MEDICARE

## 2018-07-25 VITALS
BODY MASS INDEX: 29.03 KG/M2 | HEIGHT: 67 IN | DIASTOLIC BLOOD PRESSURE: 63 MMHG | SYSTOLIC BLOOD PRESSURE: 117 MMHG | HEART RATE: 75 BPM | WEIGHT: 185 LBS | RESPIRATION RATE: 12 BRPM

## 2018-07-25 PROCEDURE — 99495 TRANSJ CARE MGMT MOD F2F 14D: CPT

## 2018-08-30 NOTE — H&P PST ADULT - WEIGHT IN KG
"              After Visit Summary   8/30/2018    Christelle Bliss    MRN: [id number]           Patient Information     Date Of Birth          1992        Visit Information        Provider Department      8/30/2018 10:30 AM Wade Bauer APRN CNM [facility]        Today's Diagnoses     Well woman exam with routine gynecological exam    -  1    Encounter for screening for cervical cancer           Care Instructions    Return to office in one year for well woman visit and as needed.    Thank you for allowing Wade MAO CNM and our OB team to participate in your care.  If you have a scheduling or an appointment question please contact [facility] Unit Coordinator at their direct line [phone].   ALL nursing questions or concerns can be directed to your OB nurse at: [phone] Liz Stephenson/Summer               Follow-ups after your visit        Who to contact     If you have questions or need follow up information about today's clinic visit or your schedule please contact [facility] directly at [phone].  Normal or non-critical lab and imaging results will be communicated to you by MyChart, letter or phone within 4 business days after the clinic has received the results. If you do not hear from us within 7 days, please contact the clinic through [employer] or phone. If you have a critical or abnormal lab result, we will notify you by phone as soon as possible.  Submit refill requests through [employer] or call your pharmacy and they will forward the refill request to us. Please allow 3 business days for your refill to be completed.          Additional Information About Your Visit        Care EveryWhere ID     This is your Care EveryWhere ID. This could be used by other organizations to access your [facility] medical records  [id number]        Your Vitals Were     Height Last Period BMI (Body Mass Index)             5' 2\" (1.575 m) 08/06/2018 31.83 kg/m2          Blood " Pressure from Last 3 Encounters:   08/30/18 102/70   11/07/17 110/70   06/19/17 100/64    Weight from Last 3 Encounters:   08/30/18 174 lb (78.9 kg)   11/07/17 168 lb (76.2 kg)   06/19/17 166 lb (75.3 kg)              We Performed the Following     A pap thin layer screen only - recommended age 21 - 24 years     HCG qualitative urine     TSH with free T4 reflex        Primary Care Provider Fax #    Physician No Ref-Primary [phone]       No address on file        Equal Access to Services     FLACO LLOYD : Hadjessi Yang, wagina peckadaha, qaybmela kaalmabarbara hill, nabeel hood . So [facility] [phone].    ATENCIÓN: Si habla español, tiene a casper disposición servicios gratuitos de asistencia lingüística. Llame al [phone].    We comply with applicable federal civil rights laws and [place] laws. We do not discriminate on the basis of race, color, national origin, age, disability, sex, sexual orientation, or gender identity.            Thank you!     Thank you for choosing [facility]  for your care. Our goal is always to provide you with excellent care. Hearing back from our patients is one way we can continue to improve our services. Please take a few minutes to complete the written survey that you may receive in the mail after your visit with us. Thank you!             Your Updated Medication List - Protect others around you: Learn how to safely use, store and throw away your medicines at www.disposemymeds.org.          This list is accurate as of 8/30/18  3:53 PM.  Always use your most recent med list.                   Brand Name Dispense Instructions for use Diagnosis    diphenhydrAMINE 25 MG tablet    BENADRYL    60 tablet    Take 1-2 tablets by mouth every 6 hours as needed for itching.    Contact dermatitis       EPIPEN IJ      Inject  as directed.        prenatal multivitamin plus iron 27-0.8 MG Tabs per tablet      Take 1 tablet by mouth daily     81.2

## 2018-08-31 VITALS
HEART RATE: 67 BPM | OXYGEN SATURATION: 99 % | RESPIRATION RATE: 18 BRPM | DIASTOLIC BLOOD PRESSURE: 73 MMHG | SYSTOLIC BLOOD PRESSURE: 133 MMHG

## 2018-08-31 NOTE — H&P ADULT - HISTORY OF PRESENT ILLNESS
SKELETON    79yo Belarusian Speaking M, former smoker, with PMHx of HTN, HLD, DM, PVD s/p BL SFA stents, CAD s/p MI with multiple PCIs (last PTCA OM2 7/10/18), who presented to Dr. Marie complaining of worsening B/L LE claudication with noticeable hair loss and blue discoloration over the past 7-8 months. Patient experiences pain on walking 2 blocks and it resolves with rest. ABIs 5/31/18 were right 1.03, left 0.84.    Cardiac Cath 7/10/18 @ [facility]: PTCA OM2 ISR, LM mild disease, patent prox/midLAD stents, distalLAD minimal irreg, LCx mild luminal irreg, patent OM1 stent, RCA small and non-dominant, EDP normal, EF 65%. History obtained via [employer]  #[id number]  CONFIRM MEDS AND ALLERGIES ON ARRIVAL    77yo Lao Speaking M, former smoker, with PMHx of HTN, HLD, DM, PVD s/p BL SFA stents and CAD s/p MI with multiple PCIs (last PTCA OM2 7/10/18) who presented to Dr. Marie complaining of worsening B/L LE claudication on exertion of walking 3 blocks; symptoms improved with rest. Patient reports that recently he has begun feeling similar symptoms when lying in bed at night with associated tingling in legs that improves somewhat when he massages his legs. Patient's prior CP and SOB has resolved since his recent cardiac cath. Patient denies CP, SOB, palpitations, PND, orthopnea, N/V, hematochezia, melena, LE edema, dizziness, syncope, LE ulceration/skin breakdown, hair loss, numbness. ABIs 5/31/18 were right 1.03, left 0.84. In light of patient's risk factors, [place] Category 4 symptoms and abnormal FRAN, patient is now referred to [facility] for recommended peripheral angiogram with possible intervention.    Cardiac Cath 7/10/18 @ [facility]: PTCA OM2 ISR, LM mild disease, patent prox/midLAD stents, distalLAD minimal irreg, LCx mild luminal irreg, patent OM1 stent, RCA small and non-dominant, EDP normal, EF 65%. History obtained via [employer]  #[id number]    79yo Turkmen Speaking M, former smoker, with PMHx of HTN, HLD, DM, PVD s/p BL SFA stents and CAD s/p MI with multiple PCIs (last PTCA OM2 7/10/18) who presented to Dr. Marie complaining of worsening B/L LE claudication on exertion of walking 3 blocks; symptoms improved with rest. Patient reports that recently he has begun feeling similar symptoms when lying in bed at night with associated tingling in legs that improves somewhat when he massages his legs. Patient's prior CP and SOB has resolved since his recent cardiac cath. Patient denies CP, SOB, palpitations, PND, orthopnea, N/V, hematochezia, melena, LE edema, dizziness, syncope, LE ulceration/skin breakdown, hair loss, numbness. ABIs 5/31/18 were right 1.03, left 0.84. In light of patient's risk factors, [place] Category 4 symptoms and abnormal FRAN, patient is now referred to [facility] for recommended peripheral angiogram with possible intervention.    Cardiac Cath 7/10/18 @ [facility]: PTCA OM2 ISR, LM mild disease, patent prox/midLAD stents, distalLAD minimal irreg, LCx mild luminal irreg, patent OM1 stent, RCA small and non-dominant, EDP normal, EF 65%.

## 2018-09-05 ENCOUNTER — INPATIENT (INPATIENT)
Facility: HOSPITAL | Age: 78
LOS: 0 days | Discharge: HOME | DRG: 272 | End: 2018-09-06
Attending: INTERNAL MEDICINE | Admitting: INTERNAL MEDICINE
Payer: COMMERCIAL

## 2018-09-05 DIAGNOSIS — Z98.890 OTHER SPECIFIED POSTPROCEDURAL STATES: Chronic | ICD-10-CM

## 2018-09-05 DIAGNOSIS — Z98.89 OTHER SPECIFIED POSTPROCEDURAL STATES: Chronic | ICD-10-CM

## 2018-09-05 DIAGNOSIS — Z98.61 CORONARY ANGIOPLASTY STATUS: Chronic | ICD-10-CM

## 2018-09-05 LAB
ALBUMIN SERPL ELPH-MCNC: 4.8 G/DL — SIGNIFICANT CHANGE UP (ref 3.3–5)
ALP SERPL-CCNC: 62 U/L — SIGNIFICANT CHANGE UP (ref 40–120)
ALT FLD-CCNC: 19 U/L — SIGNIFICANT CHANGE UP (ref 10–45)
ANION GAP SERPL CALC-SCNC: 13 MMOL/L — SIGNIFICANT CHANGE UP (ref 5–17)
APTT BLD: 33.1 SEC — SIGNIFICANT CHANGE UP (ref 27.5–37.4)
AST SERPL-CCNC: 20 U/L — SIGNIFICANT CHANGE UP (ref 10–40)
BASOPHILS NFR BLD AUTO: 0.6 % — SIGNIFICANT CHANGE UP (ref 0–2)
BILIRUB SERPL-MCNC: 0.5 MG/DL — SIGNIFICANT CHANGE UP (ref 0.2–1.2)
BUN SERPL-MCNC: 22 MG/DL — SIGNIFICANT CHANGE UP (ref 7–23)
CALCIUM SERPL-MCNC: 9.4 MG/DL — SIGNIFICANT CHANGE UP (ref 8.4–10.5)
CHLORIDE SERPL-SCNC: 99 MMOL/L — SIGNIFICANT CHANGE UP (ref 96–108)
CHOLEST SERPL-MCNC: 117 MG/DL — SIGNIFICANT CHANGE UP (ref 10–199)
CO2 SERPL-SCNC: 26 MMOL/L — SIGNIFICANT CHANGE UP (ref 22–31)
CREAT SERPL-MCNC: 1.11 MG/DL — SIGNIFICANT CHANGE UP (ref 0.5–1.3)
EOSINOPHIL NFR BLD AUTO: 0.8 % — SIGNIFICANT CHANGE UP (ref 0–6)
GLUCOSE SERPL-MCNC: 143 MG/DL — HIGH (ref 70–99)
HBA1C BLD-MCNC: 7.2 % — HIGH (ref 4–5.6)
HCT VFR BLD CALC: 38.4 % — LOW (ref 39–50)
HDLC SERPL-MCNC: 46 MG/DL — SIGNIFICANT CHANGE UP
HGB BLD-MCNC: 12.7 G/DL — LOW (ref 13–17)
INR BLD: 1.03 — SIGNIFICANT CHANGE UP (ref 0.88–1.16)
LIPID PNL WITH DIRECT LDL SERPL: 45 MG/DL — SIGNIFICANT CHANGE UP
LYMPHOCYTES # BLD AUTO: 29.9 % — SIGNIFICANT CHANGE UP (ref 13–44)
MCHC RBC-ENTMCNC: 28.8 PG — SIGNIFICANT CHANGE UP (ref 27–34)
MCHC RBC-ENTMCNC: 33.1 G/DL — SIGNIFICANT CHANGE UP (ref 32–36)
MCV RBC AUTO: 87.1 FL — SIGNIFICANT CHANGE UP (ref 80–100)
MONOCYTES NFR BLD AUTO: 7.6 % — SIGNIFICANT CHANGE UP (ref 2–14)
NEUTROPHILS NFR BLD AUTO: 61.1 % — SIGNIFICANT CHANGE UP (ref 43–77)
PLATELET # BLD AUTO: 111 K/UL — LOW (ref 150–400)
POTASSIUM SERPL-MCNC: 4.1 MMOL/L — SIGNIFICANT CHANGE UP (ref 3.5–5.3)
POTASSIUM SERPL-SCNC: 4.1 MMOL/L — SIGNIFICANT CHANGE UP (ref 3.5–5.3)
PROT SERPL-MCNC: 7.6 G/DL — SIGNIFICANT CHANGE UP (ref 6–8.3)
PROTHROM AB SERPL-ACNC: 11.4 SEC — SIGNIFICANT CHANGE UP (ref 9.8–12.7)
RBC # BLD: 4.41 M/UL — SIGNIFICANT CHANGE UP (ref 4.2–5.8)
RBC # FLD: 13.7 % — SIGNIFICANT CHANGE UP (ref 10.3–16.9)
SODIUM SERPL-SCNC: 138 MMOL/L — SIGNIFICANT CHANGE UP (ref 135–145)
TOTAL CHOLESTEROL/HDL RATIO MEASUREMENT: 2.5 RATIO — LOW (ref 3.4–9.6)
TRIGL SERPL-MCNC: 132 MG/DL — SIGNIFICANT CHANGE UP (ref 10–149)
WBC # BLD: 5 K/UL — SIGNIFICANT CHANGE UP (ref 3.8–10.5)
WBC # FLD AUTO: 5 K/UL — SIGNIFICANT CHANGE UP (ref 3.8–10.5)

## 2018-09-05 PROCEDURE — 93010 ELECTROCARDIOGRAM REPORT: CPT

## 2018-09-05 PROCEDURE — 37225: CPT

## 2018-09-05 PROCEDURE — 75630 X-RAY AORTA LEG ARTERIES: CPT | Mod: 26,59

## 2018-09-05 RX ORDER — INFLUENZA VIRUS VACCINE 15; 15; 15; 15 UG/.5ML; UG/.5ML; UG/.5ML; UG/.5ML
0.5 SUSPENSION INTRAMUSCULAR ONCE
Qty: 0 | Refills: 0 | Status: DISCONTINUED | OUTPATIENT
Start: 2018-09-05 | End: 2018-09-06

## 2018-09-05 RX ORDER — ATORVASTATIN CALCIUM 80 MG/1
40 TABLET, FILM COATED ORAL AT BEDTIME
Qty: 0 | Refills: 0 | Status: DISCONTINUED | OUTPATIENT
Start: 2018-09-05 | End: 2018-09-06

## 2018-09-05 RX ORDER — SODIUM CHLORIDE 9 MG/ML
1000 INJECTION, SOLUTION INTRAVENOUS
Qty: 0 | Refills: 0 | Status: DISCONTINUED | OUTPATIENT
Start: 2018-09-05 | End: 2018-09-06

## 2018-09-05 RX ORDER — TAMSULOSIN HYDROCHLORIDE 0.4 MG/1
0.4 CAPSULE ORAL AT BEDTIME
Qty: 0 | Refills: 0 | Status: DISCONTINUED | OUTPATIENT
Start: 2018-09-05 | End: 2018-09-06

## 2018-09-05 RX ORDER — ASPIRIN/CALCIUM CARB/MAGNESIUM 324 MG
325 TABLET ORAL ONCE
Qty: 0 | Refills: 0 | Status: COMPLETED | OUTPATIENT
Start: 2018-09-05 | End: 2018-09-05

## 2018-09-05 RX ORDER — ATENOLOL 25 MG/1
25 TABLET ORAL DAILY
Qty: 0 | Refills: 0 | Status: DISCONTINUED | OUTPATIENT
Start: 2018-09-05 | End: 2018-09-06

## 2018-09-05 RX ORDER — CILOSTAZOL 100 MG/1
100 TABLET ORAL
Qty: 0 | Refills: 0 | Status: DISCONTINUED | OUTPATIENT
Start: 2018-09-05 | End: 2018-09-06

## 2018-09-05 RX ORDER — DEXTROSE 50 % IN WATER 50 %
12.5 SYRINGE (ML) INTRAVENOUS ONCE
Qty: 0 | Refills: 0 | Status: DISCONTINUED | OUTPATIENT
Start: 2018-09-05 | End: 2018-09-06

## 2018-09-05 RX ORDER — DEXTROSE 50 % IN WATER 50 %
25 SYRINGE (ML) INTRAVENOUS ONCE
Qty: 0 | Refills: 0 | Status: DISCONTINUED | OUTPATIENT
Start: 2018-09-05 | End: 2018-09-06

## 2018-09-05 RX ORDER — LORATADINE 10 MG/1
10 TABLET ORAL DAILY
Qty: 0 | Refills: 0 | Status: DISCONTINUED | OUTPATIENT
Start: 2018-09-05 | End: 2018-09-06

## 2018-09-05 RX ORDER — SENNA PLUS 8.6 MG/1
2 TABLET ORAL AT BEDTIME
Qty: 0 | Refills: 0 | Status: DISCONTINUED | OUTPATIENT
Start: 2018-09-05 | End: 2018-09-06

## 2018-09-05 RX ORDER — INSULIN LISPRO 100/ML
VIAL (ML) SUBCUTANEOUS
Qty: 0 | Refills: 0 | Status: DISCONTINUED | OUTPATIENT
Start: 2018-09-05 | End: 2018-09-06

## 2018-09-05 RX ORDER — CLOPIDOGREL BISULFATE 75 MG/1
75 TABLET, FILM COATED ORAL DAILY
Qty: 0 | Refills: 0 | Status: DISCONTINUED | OUTPATIENT
Start: 2018-09-05 | End: 2018-09-06

## 2018-09-05 RX ORDER — SODIUM CHLORIDE 9 MG/ML
500 INJECTION INTRAMUSCULAR; INTRAVENOUS; SUBCUTANEOUS
Qty: 0 | Refills: 0 | Status: DISCONTINUED | OUTPATIENT
Start: 2018-09-05 | End: 2018-09-06

## 2018-09-05 RX ORDER — DEXTROSE 50 % IN WATER 50 %
15 SYRINGE (ML) INTRAVENOUS ONCE
Qty: 0 | Refills: 0 | Status: DISCONTINUED | OUTPATIENT
Start: 2018-09-05 | End: 2018-09-06

## 2018-09-05 RX ORDER — ASPIRIN/CALCIUM CARB/MAGNESIUM 324 MG
81 TABLET ORAL DAILY
Qty: 0 | Refills: 0 | Status: DISCONTINUED | OUTPATIENT
Start: 2018-09-05 | End: 2018-09-06

## 2018-09-05 RX ORDER — SODIUM CHLORIDE 9 MG/ML
500 INJECTION INTRAMUSCULAR; INTRAVENOUS; SUBCUTANEOUS
Qty: 0 | Refills: 0 | Status: DISCONTINUED | OUTPATIENT
Start: 2018-09-05 | End: 2018-09-05

## 2018-09-05 RX ORDER — PANTOPRAZOLE SODIUM 20 MG/1
40 TABLET, DELAYED RELEASE ORAL
Qty: 0 | Refills: 0 | Status: DISCONTINUED | OUTPATIENT
Start: 2018-09-05 | End: 2018-09-06

## 2018-09-05 RX ORDER — GLUCAGON INJECTION, SOLUTION 0.5 MG/.1ML
1 INJECTION, SOLUTION SUBCUTANEOUS ONCE
Qty: 0 | Refills: 0 | Status: DISCONTINUED | OUTPATIENT
Start: 2018-09-05 | End: 2018-09-06

## 2018-09-05 RX ORDER — LOSARTAN POTASSIUM 100 MG/1
50 TABLET, FILM COATED ORAL DAILY
Qty: 0 | Refills: 0 | Status: DISCONTINUED | OUTPATIENT
Start: 2018-09-05 | End: 2018-09-06

## 2018-09-05 RX ORDER — LIPASE/PROTEASE/AMYLASE 16-48-48K
1 CAPSULE,DELAYED RELEASE (ENTERIC COATED) ORAL
Qty: 0 | Refills: 0 | Status: DISCONTINUED | OUTPATIENT
Start: 2018-09-05 | End: 2018-09-06

## 2018-09-05 RX ORDER — CHLORHEXIDINE GLUCONATE 213 G/1000ML
1 SOLUTION TOPICAL ONCE
Qty: 0 | Refills: 0 | Status: DISCONTINUED | OUTPATIENT
Start: 2018-09-05 | End: 2018-09-05

## 2018-09-05 RX ADMIN — Medication 1 CAPSULE(S): at 18:10

## 2018-09-05 RX ADMIN — SODIUM CHLORIDE 75 MILLILITER(S): 9 INJECTION INTRAMUSCULAR; INTRAVENOUS; SUBCUTANEOUS at 09:22

## 2018-09-05 RX ADMIN — CILOSTAZOL 100 MILLIGRAM(S): 100 TABLET ORAL at 18:13

## 2018-09-05 RX ADMIN — TAMSULOSIN HYDROCHLORIDE 0.4 MILLIGRAM(S): 0.4 CAPSULE ORAL at 21:45

## 2018-09-05 RX ADMIN — ATENOLOL 25 MILLIGRAM(S): 25 TABLET ORAL at 16:53

## 2018-09-05 RX ADMIN — Medication 325 MILLIGRAM(S): at 09:22

## 2018-09-05 RX ADMIN — ATORVASTATIN CALCIUM 40 MILLIGRAM(S): 80 TABLET, FILM COATED ORAL at 21:45

## 2018-09-05 RX ADMIN — Medication 1 TABLET(S): at 16:53

## 2018-09-05 RX ADMIN — LORATADINE 10 MILLIGRAM(S): 10 TABLET ORAL at 18:10

## 2018-09-05 RX ADMIN — LOSARTAN POTASSIUM 50 MILLIGRAM(S): 100 TABLET, FILM COATED ORAL at 16:53

## 2018-09-05 RX ADMIN — SENNA PLUS 2 TABLET(S): 8.6 TABLET ORAL at 21:45

## 2018-09-06 ENCOUNTER — TRANSCRIPTION ENCOUNTER (OUTPATIENT)
Age: 78
End: 2018-09-06

## 2018-09-06 VITALS — TEMPERATURE: 98 F

## 2018-09-06 LAB
ANION GAP SERPL CALC-SCNC: 12 MMOL/L — SIGNIFICANT CHANGE UP (ref 5–17)
BUN SERPL-MCNC: 20 MG/DL — SIGNIFICANT CHANGE UP (ref 7–23)
CALCIUM SERPL-MCNC: 9.5 MG/DL — SIGNIFICANT CHANGE UP (ref 8.4–10.5)
CHLORIDE SERPL-SCNC: 101 MMOL/L — SIGNIFICANT CHANGE UP (ref 96–108)
CO2 SERPL-SCNC: 28 MMOL/L — SIGNIFICANT CHANGE UP (ref 22–31)
CREAT SERPL-MCNC: 1.14 MG/DL — SIGNIFICANT CHANGE UP (ref 0.5–1.3)
GLUCOSE SERPL-MCNC: 134 MG/DL — HIGH (ref 70–99)
HCT VFR BLD CALC: 35.8 % — LOW (ref 39–50)
HGB BLD-MCNC: 11.9 G/DL — LOW (ref 13–17)
MAGNESIUM SERPL-MCNC: 2 MG/DL — SIGNIFICANT CHANGE UP (ref 1.6–2.6)
MCHC RBC-ENTMCNC: 29.2 PG — SIGNIFICANT CHANGE UP (ref 27–34)
MCHC RBC-ENTMCNC: 33.2 G/DL — SIGNIFICANT CHANGE UP (ref 32–36)
MCV RBC AUTO: 88 FL — SIGNIFICANT CHANGE UP (ref 80–100)
PLATELET # BLD AUTO: 105 K/UL — LOW (ref 150–400)
POTASSIUM SERPL-MCNC: 3.9 MMOL/L — SIGNIFICANT CHANGE UP (ref 3.5–5.3)
POTASSIUM SERPL-SCNC: 3.9 MMOL/L — SIGNIFICANT CHANGE UP (ref 3.5–5.3)
RBC # BLD: 4.07 M/UL — LOW (ref 4.2–5.8)
RBC # FLD: 13.9 % — SIGNIFICANT CHANGE UP (ref 10.3–16.9)
SODIUM SERPL-SCNC: 141 MMOL/L — SIGNIFICANT CHANGE UP (ref 135–145)
WBC # BLD: 6.2 K/UL — SIGNIFICANT CHANGE UP (ref 3.8–10.5)
WBC # FLD AUTO: 6.2 K/UL — SIGNIFICANT CHANGE UP (ref 3.8–10.5)

## 2018-09-06 PROCEDURE — 99238 HOSP IP/OBS DSCHRG MGMT 30/<: CPT

## 2018-09-06 PROCEDURE — 99231 SBSQ HOSP IP/OBS SF/LOW 25: CPT | Mod: 25

## 2018-09-06 RX ORDER — POTASSIUM CHLORIDE 20 MEQ
20 PACKET (EA) ORAL ONCE
Qty: 0 | Refills: 0 | Status: COMPLETED | OUTPATIENT
Start: 2018-09-06 | End: 2018-09-06

## 2018-09-06 RX ADMIN — LOSARTAN POTASSIUM 50 MILLIGRAM(S): 100 TABLET, FILM COATED ORAL at 06:29

## 2018-09-06 RX ADMIN — Medication 1 CAPSULE(S): at 07:56

## 2018-09-06 RX ADMIN — Medication 20 MILLIEQUIVALENT(S): at 07:56

## 2018-09-06 RX ADMIN — PANTOPRAZOLE SODIUM 40 MILLIGRAM(S): 20 TABLET, DELAYED RELEASE ORAL at 06:29

## 2018-09-06 RX ADMIN — ATENOLOL 25 MILLIGRAM(S): 25 TABLET ORAL at 06:29

## 2018-09-06 RX ADMIN — Medication 2: at 11:36

## 2018-09-06 RX ADMIN — CILOSTAZOL 100 MILLIGRAM(S): 100 TABLET ORAL at 06:29

## 2018-09-06 NOTE — PROGRESS NOTE ADULT - SUBJECTIVE AND OBJECTIVE BOX
INTERVENTIONAL CARDIOLOGY FOLLOW UP NOTE    -Patient seen and examined this am  -No events overnight  -No complaints this am    VASCULAR ACCESS EXAM:     FEMORAL:   2+ right femoral pulse  2+ DP/PT pulses.  -Access site clean, non-tender, without ecchymosis or hematoma.    A/P  79 yo M with PMH HTN, HLD, DM, CAD s/p PCI and PAD now s/p PTA of L SFA via femoral approach with no evidence of vascular complications post procedure.     -continue with current medications including dual antiplatelet therapy   -discharge instructions as per protocol   -outpatient follow up with primary cardiologist

## 2018-09-06 NOTE — DISCHARGE NOTE ADULT - HOSPITAL COURSE
79yo Macedonian Speaking M, former smoker, with PMHx of HTN, HLD, DM, PVD s/p BL SFA stents and CAD s/p MI with multiple PCIs (last PTCA OM2 7/10/18) who presented to Dr. Marie complaining of worsening B/L LE claudication on exertion of walking 3 blocks; symptoms improved with rest. Patient reports that recently he has begun feeling similar symptoms when lying in bed at night with associated tingling in legs that improves somewhat when he massages his legs. Patient's prior CP and SOB has resolved since his recent cardiac cath. Patient denies CP, SOB, palpitations, PND, orthopnea, N/V, hematochezia, melena, LE edema, dizziness, syncope, LE ulceration/skin breakdown, hair loss, numbness. ABIs 5/31/18 were right 1.03, left 0.84. In light of patient's risk factors, [place] Category 4 symptoms and abnormal FRAN, patient is now referred to [facility] for recommended peripheral angiogram with possible intervention. Pt now s/p peripheral angio 9/5/18: atherectomy/DCB LSFA, EF 65% by cath 7/2018, right PC. Labs stable. Right groin ecchymotic but no hematoma. Labs stable. 79yo Czech Speaking M, former smoker, with PMHx of HTN, HLD, DM, PVD s/p BL SFA stents and CAD s/p MI with multiple PCIs (last PTCA OM2 7/10/18) who presented to Dr. Marie complaining of worsening B/L LE claudication on exertion of walking 3 blocks; symptoms improved with rest. Patient reports that recently he has begun feeling similar symptoms when lying in bed at night with associated tingling in legs that improves somewhat when he massages his legs. Patient's prior CP and SOB has resolved since his recent cardiac cath. Patient denies CP, SOB, palpitations, PND, orthopnea, N/V, hematochezia, melena, LE edema, dizziness, syncope, LE ulceration/skin breakdown, hair loss, numbness. ABIs 5/31/18 were right 1.03, left 0.84. In light of patient's risk factors, Lauren Category 4 symptoms and abnormal FRAN, patient is now referred to [facility] for recommended peripheral angiogram with possible intervention. Pt now s/p peripheral angio 9/5/18: atherectomy/DCB LSFA, EF 65% by cath 7/2018, right PC. Labs stable. Right groin ecchymotic but no hematoma. Labs stable. Pt already on Plavix 75mg daily, Aspirin 81mg daily and Cilostazole 50mg BID. 77yo Vietnamese Speaking M, former smoker, with PMHx of HTN, HLD, DM, PVD s/p BL SFA stents and CAD s/p MI with multiple PCIs (last PTCA OM2 7/10/18) who presented to Dr. Marie complaining of worsening B/L LE claudication on exertion of walking 3 blocks; symptoms improved with rest. Patient reports that recently he has begun feeling similar symptoms when lying in bed at night with associated tingling in legs that improves somewhat when he massages his legs. Patient's prior CP and SOB has resolved since his recent cardiac cath. Patient denies CP, SOB, palpitations, PND, orthopnea, N/V, hematochezia, melena, LE edema, dizziness, syncope, LE ulceration/skin breakdown, hair loss, numbness. ABIs 5/31/18 were right 1.03, left 0.84. In light of patient's risk factors, [place] Category 4 symptoms and abnormal FRAN, patient is now referred to [facility] for recommended peripheral angiogram with possible intervention. Pt now s/p peripheral angio 9/5/18: atherectomy/DCB LSFA, EF 65% by cath 7/2018, right PC. Labs stable. Right groin ecchymotic but no hematoma. Labs stable. Pt already on Plavix 75mg daily, Aspirin 81mg daily and Cilostazole 50mg BID. Pt is stable for discharge as per Dr Auguste. PT will follow up with Dr Marie on at [address] [facility] [address] on 9/12/18 at 10am.

## 2018-09-06 NOTE — DISCHARGE NOTE ADULT - PROVIDER TOKENS
TOKEN:'1000:MIIS:1000' FREE:[LAST:[Frank],FIRST:[Gabriel],PHONE:[phone]],FAX:[(   )    -],ADDRESS:[[address] [place] [address]]]

## 2018-09-06 NOTE — DISCHARGE NOTE ADULT - CARE PLAN
Principal Discharge DX:	PVD (peripheral vascular disease)  Goal:	You have blockages in the blood vessels that give blood and oxygen to your legs. You had a peripheral angiogram with a drug coated balloon to your distal LEFT SUPERFICIAL ARTERY. It is VERY IMPORTANT to take ASPIRIN 81mg daily and PLAVIX (CLOPIDOGREL) 75mg daily and CILOSTAZOL 50mg twice a day to keep the arteries to your heart and legs open.  Assessment and plan of treatment:	Right groin wound care: do not lift objects heavier than 5 lbs for 5 days. Avoid strenuous activity or soaking in water for 5 days. Observe for signs of bleeding including bleeding/sudden swelling to your right groin site, new/worsening back pain, or numbness/tingling/pain/coolness to your right leg/foot/toes. If you have any of these symptoms call your doctor and go to the nearest ED immediately.  Secondary Diagnosis:	HTN (hypertension)  Goal:	Continue medications for blood pressure.  Secondary Diagnosis:	HLD (hyperlipidemia)  Goal:	Continue medication for cholesterol  Secondary Diagnosis:	DM (diabetes mellitus)  Goal:	Continue medication for diabetes Principal Discharge DX:	PVD (peripheral vascular disease)  Goal:	You have blockages in the blood vessels that give blood and oxygen to your legs. You had a peripheral angiogram with a drug coated balloon to your distal LEFT SUPERFICIAL ARTERY. It is VERY IMPORTANT to take ASPIRIN 81mg daily and PLAVIX (CLOPIDOGREL) 75mg daily and CILOSTAZOL 50mg twice a day to keep the arteries to your heart and legs open.  Assessment and plan of treatment:	Right groin wound care: do not lift objects heavier than 5 lbs for 5 days. Avoid strenuous activity or soaking in water for 5 days. Observe for signs of bleeding including bleeding/sudden swelling to your right groin site, new/worsening back pain, or numbness/tingling/pain/coolness to your right leg/foot/toes. If you have any of these symptoms call your doctor and go to the nearest ED immediately. Follow up with Dr Marie in 1-2 weeks.  Secondary Diagnosis:	HTN (hypertension)  Goal:	Continue medications for blood pressure.  Secondary Diagnosis:	HLD (hyperlipidemia)  Goal:	Continue medication for cholesterol  Secondary Diagnosis:	DM (diabetes mellitus)  Goal:	Continue medication for diabetes Principal Discharge DX:	PVD (peripheral vascular disease)  Goal:	You have blockages in the blood vessels that give blood and oxygen to your legs. You had a peripheral angiogram with a drug coated balloon to your distal LEFT SUPERFICIAL ARTERY. It is VERY IMPORTANT to take ASPIRIN 81mg daily and PLAVIX (CLOPIDOGREL) 75mg daily and CILOSTAZOL 50mg twice a day to keep the arteries to your heart and legs open.  Assessment and plan of treatment:	Right groin wound care: do not lift objects heavier than 5 lbs for 5 days. Avoid strenuous activity or soaking in water for 5 days. Observe for signs of bleeding including bleeding/sudden swelling to your right groin site, new/worsening back pain, or numbness/tingling/pain/coolness to your right leg/foot/toes. If you have any of these symptoms call your doctor and go to the nearest ED immediately. Follow up with Dr Marie on 9/12/18 at 10am on [address] [facility] [address]  Secondary Diagnosis:	HTN (hypertension)  Goal:	Continue medications for blood pressure.  Secondary Diagnosis:	HLD (hyperlipidemia)  Goal:	Continue medication for cholesterol  Secondary Diagnosis:	DM (diabetes mellitus)  Goal:	Continue medication for diabetes

## 2018-09-06 NOTE — DISCHARGE NOTE ADULT - MEDICATION SUMMARY - MEDICATIONS TO TAKE
I will START or STAY ON the medications listed below when I get home from the hospital:    aspirin 81 mg oral tablet  -- 1 tab(s) by mouth once a day in am  -- Indication: For Coronary artery disease/peripheral artery disease/KEEPING BLOOD FLOWING TO YOUR HEART AND LEGS    losartan 50 mg oral tablet  -- 1 tab(s) by mouth once a day in am  -- Indication: For Blood pressure control    tamsulosin 0.4 mg oral capsule  -- 1 cap(s) by mouth once a day in pm  -- Indication: For BPH    glipiZIDE 10 mg oral tablet, extended release  -- 1 tab(s) by mouth once a day in am  -- Indication: For Diabetes    Tradjenta 5 mg oral tablet  -- 1 tab(s) by mouth once a day AM  -- Indication: For Diabetes    loratadine 10 mg oral tablet  -- 1 tab(s) by mouth once a day  -- Indication: For Allergies    atorvastatin 40 mg oral tablet  -- 1 tab(s) by mouth once a day (at bedtime)  -- Indication: For Cholesterol control    Vascepa 1 g oral capsule  -- 2 cap(s) by mouth 2 times a day  -- Indication: For Cholesterol control    Plavix 75 mg oral tablet  -- 1 tab(s) by mouth once a day  -- Indication: For Coronary artery disease/peripheral artery disease/KEEPING BLOOD FLOWING TO YOUR HEART AND LEGS    atenolol 25 mg oral tablet  -- 1 tab(s) by mouth once a day in am  -- Indication: For Blood pressure control    Creon 36,000 units oral delayed release capsule  -- 1 cap(s) by mouth 3 times a day  -- Indication: For Supplement    Senna 8.6 mg oral tablet  -- 2 tab(s) by mouth 2 times a day  -- Indication: For Constipation    cilostazol 100 mg oral tablet  -- 1 tab(s) by mouth 2 times a day/ last dose 01/29/18  -- Indication: For Coronary artery disease/peripheral artery disease/KEEPING BLOOD FLOWING TO YOUR HEART AND LEGS    Restasis 0.05% ophthalmic emulsion  -- 1 drop(s) to each affected eye every 12 hours  -- Indication: For Eye drops    Systane ophthalmic solution  -- 1 drop(s) to each affected eye 2 times a day  -- Indication: For Eye drops    Dexilant 60 mg oral delayed release capsule  -- 1 cap(s) by mouth once a day in the am  -- Indication: For Stomach acid control    Multiple Vitamins oral tablet  -- 1 tab(s) by mouth once a day  -- Indication: For Vitamin

## 2018-09-06 NOTE — DISCHARGE NOTE ADULT - PATIENT PORTAL LINK FT
You can access the [facility] Portal, offered by [facility], by registering with the following website: [email or web address]

## 2018-09-06 NOTE — DISCHARGE NOTE ADULT - PLAN OF CARE
You have blockages in the blood vessels that give blood and oxygen to your legs. You had a peripheral angiogram with a drug coated balloon to your distal LEFT SUPERFICIAL ARTERY. It is VERY IMPORTANT to take ASPIRIN 81mg daily and PLAVIX (CLOPIDOGREL) 75mg daily and CILOSTAZOL 50mg twice a day to keep the arteries to your heart and legs open. Right groin wound care: do not lift objects heavier than 5 lbs for 5 days. Avoid strenuous activity or soaking in water for 5 days. Observe for signs of bleeding including bleeding/sudden swelling to your right groin site, new/worsening back pain, or numbness/tingling/pain/coolness to your right leg/foot/toes. If you have any of these symptoms call your doctor and go to the nearest ED immediately. Continue medications for blood pressure. Continue medication for cholesterol Continue medication for diabetes Right groin wound care: do not lift objects heavier than 5 lbs for 5 days. Avoid strenuous activity or soaking in water for 5 days. Observe for signs of bleeding including bleeding/sudden swelling to your right groin site, new/worsening back pain, or numbness/tingling/pain/coolness to your right leg/foot/toes. If you have any of these symptoms call your doctor and go to the nearest ED immediately. Follow up with Dr Marie in 1-2 weeks. Right groin wound care: do not lift objects heavier than 5 lbs for 5 days. Avoid strenuous activity or soaking in water for 5 days. Observe for signs of bleeding including bleeding/sudden swelling to your right groin site, new/worsening back pain, or numbness/tingling/pain/coolness to your right leg/foot/toes. If you have any of these symptoms call your doctor and go to the nearest ED immediately. Follow up with Dr Marie on 9/12/18 at 10am on [address] [place] [id number]

## 2018-09-06 NOTE — DISCHARGE NOTE ADULT - CARE PROVIDER_API CALL
Gabriel Marie), Cardiovascular Disease; Internal Medicine  [address] [place] [address]  Phone: [phone]  Fax: [phone] Gabriel Marie  [address]  [facility]  Phone: [phone]  Fax: (   )    -

## 2018-09-08 PROCEDURE — 85610 PROTHROMBIN TIME: CPT

## 2018-09-08 PROCEDURE — 93005 ELECTROCARDIOGRAM TRACING: CPT

## 2018-09-08 PROCEDURE — 80061 LIPID PANEL: CPT

## 2018-09-08 PROCEDURE — 75630 X-RAY AORTA LEG ARTERIES: CPT

## 2018-09-08 PROCEDURE — 85730 THROMBOPLASTIN TIME PARTIAL: CPT

## 2018-09-08 PROCEDURE — 36415 COLL VENOUS BLD VENIPUNCTURE: CPT

## 2018-09-08 PROCEDURE — C1894: CPT

## 2018-09-08 PROCEDURE — 85027 COMPLETE CBC AUTOMATED: CPT

## 2018-09-08 PROCEDURE — 80048 BASIC METABOLIC PNL TOTAL CA: CPT

## 2018-09-08 PROCEDURE — C1769: CPT

## 2018-09-08 PROCEDURE — 83036 HEMOGLOBIN GLYCOSYLATED A1C: CPT

## 2018-09-08 PROCEDURE — C1725: CPT

## 2018-09-08 PROCEDURE — C1889: CPT

## 2018-09-08 PROCEDURE — 82962 GLUCOSE BLOOD TEST: CPT

## 2018-09-08 PROCEDURE — C1887: CPT

## 2018-09-08 PROCEDURE — 80053 COMPREHEN METABOLIC PANEL: CPT

## 2018-09-08 PROCEDURE — C1714: CPT

## 2018-09-08 PROCEDURE — C1760: CPT

## 2018-09-08 PROCEDURE — C2623: CPT

## 2018-09-08 PROCEDURE — 85025 COMPLETE CBC W/AUTO DIFF WBC: CPT

## 2018-09-08 PROCEDURE — 37225: CPT

## 2018-09-08 PROCEDURE — 83735 ASSAY OF MAGNESIUM: CPT

## 2018-09-08 PROCEDURE — 75716 ARTERY X-RAYS ARMS/LEGS: CPT

## 2018-09-11 DIAGNOSIS — I25.10 ATHEROSCLEROTIC HEART DISEASE OF NATIVE CORONARY ARTERY WITHOUT ANGINA PECTORIS: ICD-10-CM

## 2018-09-11 DIAGNOSIS — I10 ESSENTIAL (PRIMARY) HYPERTENSION: ICD-10-CM

## 2018-09-11 DIAGNOSIS — I70.212 ATHEROSCLEROSIS OF NATIVE ARTERIES OF EXTREMITIES WITH INTERMITTENT CLAUDICATION, LEFT LEG: ICD-10-CM

## 2018-09-11 DIAGNOSIS — I70.213 ATHEROSCLEROSIS OF NATIVE ARTERIES OF EXTREMITIES WITH INTERMITTENT CLAUDICATION, BILATERAL LEGS: ICD-10-CM

## 2018-09-11 DIAGNOSIS — E78.5 HYPERLIPIDEMIA, UNSPECIFIED: ICD-10-CM

## 2018-09-11 DIAGNOSIS — E11.51 TYPE 2 DIABETES MELLITUS WITH DIABETIC PERIPHERAL ANGIOPATHY WITHOUT GANGRENE: ICD-10-CM

## 2018-09-11 DIAGNOSIS — Z95.5 PRESENCE OF CORONARY ANGIOPLASTY IMPLANT AND GRAFT: ICD-10-CM

## 2018-09-11 DIAGNOSIS — Z87.891 PERSONAL HISTORY OF NICOTINE DEPENDENCE: ICD-10-CM

## 2018-09-11 DIAGNOSIS — Z79.84 LONG TERM (CURRENT) USE OF ORAL HYPOGLYCEMIC DRUGS: ICD-10-CM

## 2018-09-12 ENCOUNTER — APPOINTMENT (OUTPATIENT)
Dept: CARDIOLOGY | Facility: CLINIC | Age: 78
End: 2018-09-12
Payer: MEDICARE

## 2018-09-12 VITALS
DIASTOLIC BLOOD PRESSURE: 78 MMHG | BODY MASS INDEX: 28.25 KG/M2 | SYSTOLIC BLOOD PRESSURE: 136 MMHG | HEIGHT: 67 IN | WEIGHT: 180 LBS | HEART RATE: 78 BPM | RESPIRATION RATE: 15 BRPM

## 2018-09-12 PROCEDURE — 93000 ELECTROCARDIOGRAM COMPLETE: CPT

## 2018-09-12 PROCEDURE — 99495 TRANSJ CARE MGMT MOD F2F 14D: CPT

## 2018-10-12 ENCOUNTER — RX RENEWAL (OUTPATIENT)
Age: 78
End: 2018-10-12

## 2018-10-24 ENCOUNTER — APPOINTMENT (OUTPATIENT)
Dept: CARDIOLOGY | Facility: CLINIC | Age: 78
End: 2018-10-24
Payer: MEDICARE

## 2018-10-24 VITALS
HEART RATE: 86 BPM | HEIGHT: 67 IN | BODY MASS INDEX: 28.41 KG/M2 | WEIGHT: 181 LBS | DIASTOLIC BLOOD PRESSURE: 80 MMHG | SYSTOLIC BLOOD PRESSURE: 142 MMHG | RESPIRATION RATE: 12 BRPM

## 2018-10-24 PROCEDURE — 99214 OFFICE O/P EST MOD 30 MIN: CPT

## 2018-10-24 PROCEDURE — 93000 ELECTROCARDIOGRAM COMPLETE: CPT

## 2018-11-16 NOTE — PATIENT PROFILE ADULT. - TEACHING/LEARNING FACTORS INFLUENCE READINESS TO LEARN
Date of Service: 2018    GERIATRICS CONSULTATION     REASON FOR CONSULTATION:   Dementia   Altered mental status.     REQUESTING PHYSICIAN:  Katy Grant MD.     HISTORY OF PRESENT ILLNESS:  This is an 82-year-old female who was admitted to [facility] 11/15/2018 with a chief complaint of altered mental status.  Per documentation in electronic record, patient arrived to the Emergency Department with altered mental status.  Prior to this admission, patient was a resident at [facility].    At the time of my assessment, the patient was awake.  She was oriented to person.  She knew that she was in the hospital.  She could not remember the name of this hospital.   Nursing staff described that there have been no episodes of increased confusion or difficult behaviors this morning.  Nursing staff reported the patient did well last night.    Per Dr. Grant's report, the patient is more alert and oriented this morning.  The patient was calm and cooperative during my assessment, the patient stated \"I am here because I was cold and I was in pain.\" \"I was told I have a kidney infection.\"  The patient could not describe or recall where she was living prior to this admission, patient answered that she was living \"at home.\" The patient is a poor historian.      We have a power of  for healthcare document scanned in [employer]. I was unable to get ahold of the first agent. Per documentation sent to [facility] from [facility] the patient's emergency contact is .  is in the process of identifying power of  for healthcare agent.    Reviewing documentation from the patient's facility, the patient has a history of seizure, vitamin D deficiency, insomnia, major depressive disorder, hearing loss, vitamin B12 deficiency, constipation.    MEDICATION LIST PRIOR TO ADMISSION:  Reviewed.    PAST MEDICAL HISTORY:  1.  Anxiety.  2.  Arthritis.  3.  Cataracts, both eyes.  4.   Cognitive deficits.  5.  Depression.  6.  GERD.  7.  Hypertension.  8.  Fracture.  9. Full denture.  10. Gait instability.  11.  Hard of hearing.   12.  Mental disorder.  13.  Hyperlipidemia.  14.  Pneumonia.  15.  Seizures.  16.  Urinary tract infections.    PAST SURGICAL HISTORY:  1.  Eye surgery, left cataract.  2.  Eye surgery, right cataract.  3.  Tonsillectomy and adenoidectomy.    HOSPITAL MEDICATION LIST:  Reviewed.    ALLERGIES:  Reviewed.    FAMILY HISTORY:  Mother had heart disease.  Father had a stroke.    SOCIAL HISTORY:  Power of  for healthcare document scanned in [place].   is in the process of locating patient's power of  for healthcare agent.  Prior to this admission to [facility] per documentation in electronic record, the patient was a resident at [facility].  No history of recent use of alcohol or tobacco prior to this admission.    REVIEW OF SYSTEMS:  As mentioned in history of present illness.  The patient is a poor historian.  Review of systems is limited.  The patient is presently denying pain.  No impulsivity or restlessness.  Per nursing report, patient has been calm and cooperative during nursing cares.  There have been no episodes of increased confusion or disorientation.  Per nursing report, patient did well last evening.    REVIEW OF SYSTEMS:  As mentioned in the history of present illness.    PHYSICAL EXAMINATION:  GENERAL:  An 82-year-old female, no acute distress.  VITAL SIGNS:  Temperature 100, heart rate 79, respiratory rate 18, blood pressure 129/60, pulse ox 97%.  HEENT:  Oral mucous membranes are moist, sclerae are anicteric.  Pupils equal.  NECK:  Supple, no thyromegaly.  HEART:  S1, S2 heard.  LUNGS:  Decreased breath sounds in bases, otherwise clear to auscultation.  ABDOMEN:  Soft, nontender, nondistended, bowel sounds present.  GENITOURINARY:  No suprapubic tenderness.  MUSCULOSKELETAL:  The patient moves upper and lower  extremities spontaneously.  NEUROLOGIC:  The patient follows simple verbal commands.  No gross neurologic deficit at this time.  PSYCHIATRIC:  The patient is awake, oriented only to person.  She knew that she was at the hospital.  She cannot remember the name of this hospital.  The patient described that she came to [facility] because she \"was cold and she was in pain.\"  \"I was told I have a kidney infection.\"  The patient is a poor historian.  She had difficulty naming the days of the week backwards.  She could not remember where she was living prior to this admission.    LABORATORY DATA:  Blood work done on 11/16/2018 revealed a sodium 140, potassium 3.8, BUN 21, creatinine 1.50, white blood cell count 12.3, hemoglobin 8.6, hematocrit 26.3.  QTC on 11/15/2018 was 464.  CT head without contrast on 11/15/2018 showed no acute intracranial hemorrhage, chronic ischemic changes including chronic left cerebral infarct, chronic right capsular/corona radiata lacunar injury and microvascular ischemic changes.    IMPRESSION AND PLAN:  1.  Hypoactive delirium, multifactorial.(Urinary tract infection, sensory impairment, functional deficits, baseline cognitive impairment)   Per internal medicine report, the patient is more oriented this morning.  I will simply recommend to deploy nonpharmacologic strategies including frequent orientation, frequent surveillance, and fall precautions.    The patient has been started on IV antibiotics.  Urine culture and blood culture results pending.  I recommend to monitor bowel function and monitor postvoid residuals.  Bladder scan ordered.  Monitor fluid and electrolyte balance.  Recommend assistance with all meals and encourage p.o. intake.  Maintain sleep/wake cycle.  The patient has a history of insomnia.  Melatonin p.r.n. ordered.  Stool softener ordered.    2. Cognitive impairment:  Per documentation in the electronic health record, the patient has a history of cognitive  deficits.    Recommend communication and cognition evaluation.  [facility] the patient's emergency contact is .     will assist identifying power of  for healthcare agent.    3. Functional impairment/debility:  Physical therapy and occupational therapy evaluations pending.    Case discussed with Dr. Grant and .    Thank you for requesting the geriatrics consultation.  Please call  if you have any questions     Sergio Shay MD  [facility] Consult Service  Board Certified:  Geriatric Medicine    cc: Katy Grant MD     none

## 2018-11-21 ENCOUNTER — APPOINTMENT (OUTPATIENT)
Dept: CARDIOLOGY | Facility: CLINIC | Age: 78
End: 2018-11-21
Payer: MEDICARE

## 2018-11-21 VITALS
SYSTOLIC BLOOD PRESSURE: 123 MMHG | BODY MASS INDEX: 28.25 KG/M2 | HEART RATE: 74 BPM | RESPIRATION RATE: 15 BRPM | DIASTOLIC BLOOD PRESSURE: 65 MMHG | WEIGHT: 180 LBS | HEIGHT: 67 IN

## 2018-11-21 PROCEDURE — 99214 OFFICE O/P EST MOD 30 MIN: CPT

## 2018-11-21 NOTE — DISCUSSION/SUMMARY
[FreeTextEntry1] : 78 M HTN hyperlipidemia CAD s/p PCI PVD for f/u.\par Continue meds for HTN.\par Continue statin.\par Continue ASA and plavix for CAD.\par Monitor PVD, exercise and diet.

## 2018-11-21 NOTE — REASON FOR VISIT
[Follow-Up - Clinic] : a clinic follow-up of [Coronary Artery Disease] : coronary artery disease [Hyperlipidemia] : hyperlipidemia [Hypertension] : hypertension [FreeTextEntry1] : 78 M HTN hyperlipidemia CAD old MI PCI PVD for f/u.

## 2018-11-21 NOTE — PHYSICAL EXAM
[General Appearance - Well Developed] : well developed [Normal Appearance] : normal appearance [Well Groomed] : well groomed [General Appearance - Well Nourished] : well nourished [No Deformities] : no deformities [General Appearance - In No Acute Distress] : no acute distress [Normal Conjunctiva] : the conjunctiva exhibited no abnormalities [Eyelids - No Xanthelasma] : the eyelids demonstrated no xanthelasmas [Normal Oral Mucosa] : normal oral mucosa [No Oral Pallor] : no oral pallor [No Oral Cyanosis] : no oral cyanosis [Normal Jugular Venous A Waves Present] : normal jugular venous A waves present [Normal Jugular Venous V Waves Present] : normal jugular venous V waves present [No Jugular Venous Weiner A Waves] : no jugular venous weiner A waves [Respiration, Rhythm And Depth] : normal respiratory rhythm and effort [Exaggerated Use Of Accessory Muscles For Inspiration] : no accessory muscle use [Auscultation Breath Sounds / Voice Sounds] : lungs were clear to auscultation bilaterally [Heart Rate And Rhythm] : heart rate and rhythm were normal [Heart Sounds] : normal S1 and S2 [Murmurs] : no murmurs present [Abdomen Soft] : soft [Abdomen Tenderness] : non-tender [Abdomen Mass (___ Cm)] : no abdominal mass palpated [Nail Clubbing] : no clubbing of the fingernails [Cyanosis, Localized] : no localized cyanosis [Petechial Hemorrhages (___cm)] : no petechial hemorrhages [Skin Color & Pigmentation] : normal skin color and pigmentation [] : no rash [No Venous Stasis] : no venous stasis [Skin Lesions] : no skin lesions [No Skin Ulcers] : no skin ulcer [No Xanthoma] : no  xanthoma was observed [Oriented To Time, Place, And Person] : oriented to person, place, and time [Affect] : the affect was normal [Mood] : the mood was normal [No Anxiety] : not feeling anxious

## 2018-11-21 NOTE — HISTORY OF PRESENT ILLNESS
[FreeTextEntry1] : 78 M HTN hyperlipidemia CAD old MI PCI PVD for f/u. \par 1. HTN: on medications, compliant with medications. \par 2. Hyperlipidemia: on statin, no SE noted. \par 3. 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 Patient is on medical therapy and recently underwent cardiac cath which showed severe OPM2 stent. He underwent PCI and was discharged on 7/11. His CP and SOB have improved. \par 4. PVD: patient has deep fem disease on the R on US in 2015. He had PTCA in the past. He now reports worsening claudication over the last month that is relieved with rest. He still has intermittent claudication. He walks about 5 blocks at a time. No rest pain is noted. He is compliant with his medications.

## 2019-01-23 ENCOUNTER — APPOINTMENT (OUTPATIENT)
Dept: CARDIOLOGY | Facility: CLINIC | Age: 79
End: 2019-01-23

## 2019-02-13 ENCOUNTER — APPOINTMENT (OUTPATIENT)
Dept: CARDIOLOGY | Facility: CLINIC | Age: 79
End: 2019-02-13
Payer: MEDICARE

## 2019-02-13 VITALS
DIASTOLIC BLOOD PRESSURE: 67 MMHG | WEIGHT: 181 LBS | HEART RATE: 68 BPM | BODY MASS INDEX: 28.41 KG/M2 | SYSTOLIC BLOOD PRESSURE: 114 MMHG | RESPIRATION RATE: 12 BRPM | HEIGHT: 67 IN

## 2019-02-13 DIAGNOSIS — R42 DIZZINESS AND GIDDINESS: ICD-10-CM

## 2019-02-13 PROCEDURE — 93000 ELECTROCARDIOGRAM COMPLETE: CPT

## 2019-02-13 PROCEDURE — 99214 OFFICE O/P EST MOD 30 MIN: CPT

## 2019-02-13 NOTE — REASON FOR VISIT
[Follow-Up - Clinic] : a clinic follow-up of [Coronary Artery Disease] : coronary artery disease [Hyperlipidemia] : hyperlipidemia [Hypertension] : hypertension [Peripheral Vascular Disease] : peripheral vascular disease [FreeTextEntry1] : 78 M HTN hyperlipidemia CAD old MI PCI PVD for f/u.

## 2019-02-13 NOTE — DISCUSSION/SUMMARY
[FreeTextEntry1] : 78 M HTN hyperlipidemia CAD s/p PCI PVD for f/u of CP.\par CHECK HOLTER (patient also reports palpitations).\par CHECK FRAN.\par Continue meds for HTN.\par Clontinue ASA and plavix.\par Continue NG prn.

## 2019-02-13 NOTE — HISTORY OF PRESENT ILLNESS
[FreeTextEntry1] : 1. HTN: on medications, no SE noted. \par 2. Hyperlipidemia: on statin, lipid profile is followed by PMD.\par 3. 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 Patient is on medical therapy and recently underwent cardiac cath which showed severe OPM2 stent. He underwent PCI and was discharged on 7/11.  \par The patient reports intermittent CP with exertion. The pain is relieved with rest. He has not used NG recently. The patient reports mild SOB but ET is preserved.\par 4. PVD: patient has deep fem disease on the R on US in 2015. His claudication has improved post PTCA.\par  \par

## 2019-02-14 ENCOUNTER — APPOINTMENT (OUTPATIENT)
Dept: CARDIOLOGY | Facility: CLINIC | Age: 79
End: 2019-02-14
Payer: MEDICARE

## 2019-02-14 VITALS
RESPIRATION RATE: 12 BRPM | OXYGEN SATURATION: 96 % | SYSTOLIC BLOOD PRESSURE: 118 MMHG | TEMPERATURE: 97.5 F | BODY MASS INDEX: 28.19 KG/M2 | DIASTOLIC BLOOD PRESSURE: 64 MMHG | HEART RATE: 67 BPM | WEIGHT: 180 LBS

## 2019-02-14 PROCEDURE — 93922 UPR/L XTREMITY ART 2 LEVELS: CPT

## 2019-02-14 PROCEDURE — 99214 OFFICE O/P EST MOD 30 MIN: CPT

## 2019-02-14 PROCEDURE — 93224 XTRNL ECG REC UP TO 48 HRS: CPT

## 2019-02-14 RX ORDER — MECLIZINE HYDROCHLORIDE 12.5 MG/1
12.5 TABLET ORAL DAILY
Qty: 60 | Refills: 1 | Status: ACTIVE | COMMUNITY
Start: 2019-02-13 | End: 1900-01-01

## 2019-02-14 NOTE — HISTORY OF PRESENT ILLNESS
[FreeTextEntry1] : 1. HTN: on medications, BP controlled.\par 2. Hyperlipidemia: on statin, lipid profile is followed by PMD.\par 3. 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 Patient is on medical therapy and recently underwent cardiac cath which showed severe OPM2 stent. He underwent PCI and was discharged on 7/11. \par The patient reports intermittent CP with exertion. The pain is relieved with rest. He has not used NG recently. The patient reports mild SOB but ET is preserved.\par 4. PVD: patient has deep fem disease on the R on US in 2015. His claudication has improved post PTCA. \par He saw me yesterday but now returns with worsening SOB and cough with mild wheezing and dizziness. He has claudication as noted above.

## 2019-02-14 NOTE — DISCUSSION/SUMMARY
[FreeTextEntry1] : 78 M HTN hyperlipidemia CAD s/p PCI PVD for f/u with cough and dizziness.\par Cough improving, no abnormalities on exam.\par Start meclizine.\par CHECK FRAN FOR PVD.\par Continue meds for HTN.\par COntinue ASA and plavix.\par

## 2019-02-20 ENCOUNTER — NON-APPOINTMENT (OUTPATIENT)
Age: 79
End: 2019-02-20

## 2019-03-06 ENCOUNTER — APPOINTMENT (OUTPATIENT)
Dept: CARDIOLOGY | Facility: CLINIC | Age: 79
End: 2019-03-06
Payer: MEDICARE

## 2019-03-06 VITALS
RESPIRATION RATE: 15 BRPM | WEIGHT: 181 LBS | DIASTOLIC BLOOD PRESSURE: 52 MMHG | SYSTOLIC BLOOD PRESSURE: 110 MMHG | HEIGHT: 67 IN | BODY MASS INDEX: 28.41 KG/M2 | HEART RATE: 74 BPM

## 2019-03-06 PROCEDURE — 99214 OFFICE O/P EST MOD 30 MIN: CPT

## 2019-03-06 NOTE — HISTORY OF PRESENT ILLNESS
[FreeTextEntry1] : 1. HTN: on medications, no SE noted. \par 2. Hyperlipidemia: on statin, no muscle pain is noted. \par 3. 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 Patient is on medical therapy and recently underwent cardiac cath which showed severe OPM2 stent. He underwent PCI and was discharged on 7/11. \par 4. PVD: patient has deep fem disease on the R on US in 2015. His claudication has improved post PTCA. \par He saw me yesterday but now returns with worsening SOB and cough with mild wheezing and dizziness. His cluadication is improving.\par Due to persistent palpitations, he also had a holter done that showed PVCs.\par

## 2019-03-06 NOTE — DISCUSSION/SUMMARY
[FreeTextEntry1] : 78 M HTN hyperlipidemia CAD s/p PCI PVD for f/u.\par Rare PVCs noted.\par Cpontinue meds for HTN.\par Continue statin.\par Continue ASA and plavix for CAD.

## 2019-03-06 NOTE — REASON FOR VISIT
[Follow-Up - Clinic] : a clinic follow-up of [Coronary Artery Disease] : coronary artery disease [Hyperlipidemia] : hyperlipidemia [Hypertension] : hypertension [Peripheral Vascular Disease] : peripheral vascular disease [Prior Myocardial Infarction] : a prior myocardial infarction [FreeTextEntry1] : 78 M HTN hyperlipidemia CAD old MI PCI PVD for f/u.

## 2019-03-23 NOTE — H&P ADULT - ASSESSMENT
1. Hyperbilirubinemia    -- s/p TJ Liver Bx on 3/21. F/u path  -- GI following - acute cholestatic hepatitis of unclear etiology. ? Drug related ? Autoimmune w + LUCERO  -- MRI/MRCP distended GB w Cholelithiasis. No biliary ductal dilatation  -- no evidence of malignancy on CT A/P  -- tumor markers unremarkable     2. 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 Thrombocytopenia    -- platelet count slowly trending down  -- sec to acute liver injury  --  no bleeding  -- monitor for now    Carlos Gloria MD  [phone] 77yo Italian Speaking M, former smoker, with PMHx of HTN, HLD, DM, PVD s/p BL SFA stents and CAD s/p MI with multiple PCIs (last PTCA OM2 7/10/18) who presents for recommended Peripheral Angiogram with possible intervention if clinically indicated.      ASA: III and Mallampati: III    -Precath/Consented  - IVF Hydration NS @ 75cc/hr.  -Pt took his daily dose of Plavix 75mg PO X 1.  He received ASA 325mg PO X 1 pre-procedure. 77yo [place] Speaking M, former smoker, with PMHx of HTN, HLD, DM, PVD s/p BL SFA stents and CAD s/p MI with multiple PCIs (last PTCA OM2 7/10/18) who comes in for claudication and now presents for recommended Peripheral Angiogram with possible intervention if clinically indicated.      ASA: III and Mallampati: III    -Precath/Consented  - IVF Hydration NS @ 75cc/hr.  -Pt took his daily dose of Plavix 75mg PO X 1.  He received ASA 325mg PO X 1 pre-procedure.

## 2019-05-01 ENCOUNTER — OUTPATIENT (OUTPATIENT)
Dept: OUTPATIENT SERVICES | Facility: HOSPITAL | Age: 79
LOS: 1 days | End: 2019-05-01
Payer: MEDICARE

## 2019-05-01 ENCOUNTER — APPOINTMENT (OUTPATIENT)
Dept: CARDIOLOGY | Facility: CLINIC | Age: 79
End: 2019-05-01
Payer: MEDICARE

## 2019-05-01 VITALS
WEIGHT: 180 LBS | RESPIRATION RATE: 14 BRPM | DIASTOLIC BLOOD PRESSURE: 70 MMHG | HEART RATE: 67 BPM | HEIGHT: 67 IN | BODY MASS INDEX: 28.25 KG/M2 | SYSTOLIC BLOOD PRESSURE: 125 MMHG

## 2019-05-01 DIAGNOSIS — Z98.89 OTHER SPECIFIED POSTPROCEDURAL STATES: Chronic | ICD-10-CM

## 2019-05-01 DIAGNOSIS — Z98.890 OTHER SPECIFIED POSTPROCEDURAL STATES: Chronic | ICD-10-CM

## 2019-05-01 DIAGNOSIS — Z98.61 CORONARY ANGIOPLASTY STATUS: Chronic | ICD-10-CM

## 2019-05-01 PROCEDURE — 99214 OFFICE O/P EST MOD 30 MIN: CPT

## 2019-05-01 PROCEDURE — 93000 ELECTROCARDIOGRAM COMPLETE: CPT

## 2019-05-01 PROCEDURE — G9001: CPT

## 2019-05-01 NOTE — PHYSICAL EXAM
[General Appearance - Well Developed] : well developed [Normal Appearance] : normal appearance [Well Groomed] : well groomed [General Appearance - Well Nourished] : well nourished [No Deformities] : no deformities [General Appearance - In No Acute Distress] : no acute distress [Normal Conjunctiva] : the conjunctiva exhibited no abnormalities [Normal Oral Mucosa] : normal oral mucosa [Eyelids - No Xanthelasma] : the eyelids demonstrated no xanthelasmas [No Oral Pallor] : no oral pallor [No Oral Cyanosis] : no oral cyanosis [Normal Jugular Venous A Waves Present] : normal jugular venous A waves present [Normal Jugular Venous V Waves Present] : normal jugular venous V waves present [No Jugular Venous Weiner A Waves] : no jugular venous weiner A waves [Respiration, Rhythm And Depth] : normal respiratory rhythm and effort [Exaggerated Use Of Accessory Muscles For Inspiration] : no accessory muscle use [Auscultation Breath Sounds / Voice Sounds] : lungs were clear to auscultation bilaterally [Heart Rate And Rhythm] : heart rate and rhythm were normal [Heart Sounds] : normal S1 and S2 [Murmurs] : no murmurs present [Abdomen Soft] : soft [Abdomen Tenderness] : non-tender [Abdomen Mass (___ Cm)] : no abdominal mass palpated [Nail Clubbing] : no clubbing of the fingernails [Cyanosis, Localized] : no localized cyanosis [Petechial Hemorrhages (___cm)] : no petechial hemorrhages [Skin Color & Pigmentation] : normal skin color and pigmentation [] : no rash [Skin Lesions] : no skin lesions [No Venous Stasis] : no venous stasis [No Xanthoma] : no  xanthoma was observed [No Skin Ulcers] : no skin ulcer [Oriented To Time, Place, And Person] : oriented to person, place, and time [Affect] : the affect was normal [Mood] : the mood was normal [No Anxiety] : not feeling anxious

## 2019-05-01 NOTE — DISCUSSION/SUMMARY
[FreeTextEntry1] : 79 M HTN hyperlipidemia CAD s/p PCI PVD with claudication.\par Will monitor for now. Continue exercise and antiplatelet treatment.\par Continue meds for HTN.\par Continue ASA and plavix for CAD.

## 2019-05-01 NOTE — REASON FOR VISIT
[Follow-Up - Clinic] : a clinic follow-up of [Hypertension] : hypertension [Coronary Artery Disease] : coronary artery disease [Hyperlipidemia] : hyperlipidemia [FreeTextEntry1] : 79 M HTN hyperlipidemia CAD old MI PCI PVD for f/u.

## 2019-05-01 NOTE — HISTORY OF PRESENT ILLNESS
[FreeTextEntry1] : 1. HTN: on medications, compliant with medications.\par 2. Hyperlipidemia: on statin, no SE noted.\par 3. 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 Patient is on medical therapy and recently underwent cardiac cath which showed severe OPM2 stent. He underwent PCI and his CP and SOB have improved. \par 4. PVD: patient has deep fem disease on the R on US in 2015. His claudication has improved post PTCA. \par His claudication is slightly worse since his last visit. He exercises daily but has noted intermittent pain that is associated with walking 2- 3 blocks.

## 2019-05-23 DIAGNOSIS — Z71.89 OTHER SPECIFIED COUNSELING: ICD-10-CM

## 2019-06-05 ENCOUNTER — APPOINTMENT (OUTPATIENT)
Dept: CARDIOLOGY | Facility: CLINIC | Age: 79
End: 2019-06-05
Payer: MEDICARE

## 2019-06-05 VITALS
BODY MASS INDEX: 28.41 KG/M2 | HEIGHT: 67 IN | SYSTOLIC BLOOD PRESSURE: 122 MMHG | WEIGHT: 181 LBS | DIASTOLIC BLOOD PRESSURE: 85 MMHG | HEART RATE: 75 BPM | RESPIRATION RATE: 12 BRPM

## 2019-06-05 PROCEDURE — 93000 ELECTROCARDIOGRAM COMPLETE: CPT

## 2019-06-05 PROCEDURE — 99214 OFFICE O/P EST MOD 30 MIN: CPT

## 2019-06-05 NOTE — PHYSICAL EXAM
[Normal Appearance] : normal appearance [General Appearance - Well Developed] : well developed [Well Groomed] : well groomed [No Deformities] : no deformities [General Appearance - Well Nourished] : well nourished [Normal Conjunctiva] : the conjunctiva exhibited no abnormalities [General Appearance - In No Acute Distress] : no acute distress [Eyelids - No Xanthelasma] : the eyelids demonstrated no xanthelasmas [Normal Oral Mucosa] : normal oral mucosa [Normal Jugular Venous A Waves Present] : normal jugular venous A waves present [No Oral Pallor] : no oral pallor [No Oral Cyanosis] : no oral cyanosis [Normal Jugular Venous V Waves Present] : normal jugular venous V waves present [No Jugular Venous Weiner A Waves] : no jugular venous weiner A waves [Respiration, Rhythm And Depth] : normal respiratory rhythm and effort [Exaggerated Use Of Accessory Muscles For Inspiration] : no accessory muscle use [Auscultation Breath Sounds / Voice Sounds] : lungs were clear to auscultation bilaterally [Heart Rate And Rhythm] : heart rate and rhythm were normal [Heart Sounds] : normal S1 and S2 [Murmurs] : no murmurs present [Abdomen Soft] : soft [Abdomen Tenderness] : non-tender [Nail Clubbing] : no clubbing of the fingernails [Abdomen Mass (___ Cm)] : no abdominal mass palpated [Cyanosis, Localized] : no localized cyanosis [Petechial Hemorrhages (___cm)] : no petechial hemorrhages [Skin Color & Pigmentation] : normal skin color and pigmentation [No Venous Stasis] : no venous stasis [] : no rash [Skin Lesions] : no skin lesions [No Skin Ulcers] : no skin ulcer [No Xanthoma] : no  xanthoma was observed [Oriented To Time, Place, And Person] : oriented to person, place, and time [Affect] : the affect was normal [Mood] : the mood was normal [No Anxiety] : not feeling anxious

## 2019-06-05 NOTE — HISTORY OF PRESENT ILLNESS
[facility]  Neurology  Consult Note    Patient Name: Yonathan Good Jr.  MRN: [id number]  Admission Date: 7/7/2018  Hospital Length of Stay: 6 days  Code Status: Full Code   Attending Provider: Mohan Cross MD   Consulting Provider: Esthela Eden MD  Primary Care Physician: Edgar Gates MD  Principal Problem:Ventricular tachycardia, incessant    Inpatient consult to Neurology  Consult performed by: ESTHELA EDEN  Consult ordered by: LEONOR LOPEZ  Reason for consult: concern for seizures         Subjective:     Chief Complaint:  Twitching of the face     HPI:   The patient is a 56 y/o AAM with previous CVA x2 and subsequent seizures (on AED) and extensive cardiac Hx, currently admitted to the -ICU with cardiogenic shock and respiratory failure, who is consulted to neurology for twitching movements in the face and upper extremities.    The daughter at the bedside is providing the Hx. In brief, patient with HFrEF (EF 5-10%) due to ICM s/p ICD and a candidate for heart transplant, sarcoidosis on chronic steroids, and 2 CVAs (L parietal, w/o residual deficits), with seizures episodes x5 immediately the next day after the second stroke, and since then he has been on phenytoin, w/o recurrence of the episodes. Had a recent admission (6/22-7/4) for dysarthria and concerns for stroke, which was negative in the work up, however during that admission he had several episodes of VT and A.fib and therefore after cardiology work up was discharged on amiodarone/warfarin/Lovenox bridge and phenytoin was changed to levetiracetam due to interactions with warfarin.   on 7/8 he presented with hematochezia and hypotension down to 70s. At OSH he was noted to have a 2.5 unit drop in Hgb and increase in Cr up to 2.8, [place] [facility] [place] IABP on 7/10 due to cardiogenic shock, currently on vasopressors. Also due to impending respiratory failure he was intubated and started on sedation.  Yesterday evening he was noted by the daughter and the nurse to have twitching movements in the facial muscles and bilateral upper extremities that took 5 minutes and has not recurred since.       Past Medical History:   Diagnosis Date    AICD (automatic cardioverter/defibrillator) present     Arthritis     Atrial fibrillation     CHF (congestive heart failure)     Coronary artery disease     History of stroke 7/13/2018    2005, 2006, no deficits per wife.    Hypertension     MI (myocardial infarction)     Sarcoid     Seizures     Stroke        Past Surgical History:   Procedure Laterality Date    CARDIAC CATHETERIZATION      CARDIAC DEFIBRILLATOR PLACEMENT  7-12    CARDIAC DEFIBRILLATOR PLACEMENT      CARDIAC DEFIBRILLATOR PLACEMENT      COLONOSCOPY N/A 7/2/2018    Procedure: COLONOSCOPY;  Surgeon: THELMA Kay MD;  Location: [facility] ENDO ([address] [place] [address]);  Service: Endoscopy;  Laterality: N/A;    CORONARY STENT PLACEMENT  07/2011    ESOPHAGOGASTRODUODENOSCOPY      FRACTURE SURGERY      LEFT HEART CATHETERIZATION N/A 6/25/2018    Procedure: Left heart cath;  Surgeon: Jordi Lui MD;  Location: [facility] CATH LAB;  Service: Cardiology;  Laterality: N/A;       Review of patient's allergies indicates:  No Known Allergies    Current Neurological Medications:   Levetiracetam 1 gr q12    No current facility-administered medications on file prior to encounter.      Current Outpatient Prescriptions on File Prior to Encounter   Medication Sig    albuterol (PROVENTIL) 2.5 mg /3 mL (0.083 %) nebulizer solution Take 2.5 mg by nebulization every 6 (six) hours as needed.    albuterol (VENTOLIN HFA) 90 mcg/actuation inhaler Inhale 2 puffs into the lungs every 4 (four) hours as needed for Wheezing.    alprazolam (XANAX) 2 MG tablet Take 2 mg by mouth 2 (two) times daily as needed.     amiodarone (PACERONE) 400 MG tablet Take 1 tablet (400 mg total) by mouth 2 (two) times daily until 7/9/2018. Then take 1 tablet  (400 mg total) by mouth 1 (one) time daily thereafter.    aspirin (ECOTRIN) 81 MG EC tablet Take 81 mg by mouth. 1 Tablet, Delayed Release (E.C.) Oral Every day    atorvastatin (LIPITOR) 40 MG tablet Take 1 tablet (40 mg total) by mouth once daily.    bumetanide (BUMEX) 1 MG tablet Take 1 mg by mouth daily as needed.    carisoprodol (SOMA) 350 MG tablet Take 350 mg by mouth 4 (four) times daily as needed for Muscle spasms.    colchicine 0.6 mg tablet 0.6 mg once daily.     enoxaparin (LOVENOX) 100 mg/mL Syrg Inject 1 mL (100 mg total) into the skin every 12 (twelve) hours.    fluticasone-vilanterol (BREO ELLIPTA) 200-25 mcg/dose DsDv diskus inhaler Inhale 1 puff into the lungs once daily. Controller    gabapentin (NEURONTIN) 600 MG tablet Take 600 mg by mouth 2 (two) times daily. 1 Tablet Oral At bedtime    hydrocodone-acetaminophen 10-325mg (NORCO)  mg Tab Take 1 tablet by mouth 2 (two) times daily as needed.     isosorbide mononitrate (IMDUR) 30 MG 24 hr tablet Take 3 tablets (90 mg total) by mouth once daily. (Patient taking differently: Take 60 mg by mouth once daily. )    levETIRAcetam (KEPPRA) 500 MG Tab Take 1 tablet (500 mg total) by mouth 2 (two) times daily.    losartan (COZAAR) 50 MG tablet Take 1 tablet (50 mg total) by mouth once daily.    metoprolol tartrate (LOPRESSOR) 25 MG tablet Take 1 tablet (25 mg total) by mouth 2 (two) times daily.    nitroGLYCERIN (NITROSTAT) 0.4 MG SL tablet ONE TABLET UNDER TONGUE AS NEEDED FOR CHEST PAIN    pantoprazole (PROTONIX) 40 MG tablet Take 40 mg by mouth. 1 Tablet, Delayed Release (E.C.) Oral Every day    potassium chloride SA (K-DUR,KLOR-CON) 20 MEQ tablet TAKE 2 TABLETS BY MOUTH EVERY MORNING AND 1 TABLET BY MOUTH EVERY EVENING    predniSONE (DELTASONE) 10 MG tablet Take 1 tablet (10 mg total) by mouth once daily.    promethazine-codeine 6.25-10 mg/5 ml (PHENERGAN WITH CODEINE) 6.25-10 mg/5 mL syrup TK 5 ML PO  Q 6 H PRN    spironolactone  (ALDACTONE) 25 MG tablet TAKE 1 TABLET BY MOUTH EVERY DAY    warfarin (COUMADIN) 7.5 MG tablet 1 tablet Monday  0.5 tablet Tuesday-Sunday     Family History     Problem Relation (Age of Onset)    Cancer Maternal Grandmother    Coronary artery disease Father, Sister, Sister    Heart disease Mother, Father, Sister    Hypertension Mother, Father, Sister    Kidney disease Sister    Stroke Sister    Vision loss Sister        Social History Main Topics    Smoking status: Never Smoker    Smokeless tobacco: Never Used    Alcohol use No    Drug use: No    Sexual activity: Not on file     Review of Systems   Unable to perform ROS: Intubated   Constitutional: Negative for fever.     Objective:     Vital Signs (Most Recent):  Temp: 98.4 °F (36.9 °C) (07/13/18 1500)  Pulse: 80 (07/13/18 1615)  Resp: 16 (07/13/18 1615)  BP: 114/61 (07/13/18 1615)  SpO2: 96 % (07/13/18 1615) Vital Signs (24h Range):  Temp:  [98.2 °F (36.8 °C)-98.6 °F (37 °C)] 98.4 °F (36.9 °C)  Pulse:  [80] 80  Resp:  [10-39] 16  SpO2:  [93 %-100 %] 96 %  BP: ()/(53-93) 114/61     Weight: 110.5 kg (243 lb 9.7 oz)  Body mass index is 33.98 kg/m².    Physical Exam   Constitutional: He appears well-developed and well-nourished. He is intubated.   HENT:   Head: Normocephalic.   Cardiovascular: Normal rate.    Pulmonary/Chest: No tachypnea. He is intubated.   Musculoskeletal: He exhibits no edema.   Neurological: He is unresponsive. He displays no seizure activity.   Patient on propofol and fentanyl, therefore unable to assess neurologically  Pupils pinpoint bilaterally   No abnormal twitching movements were noticed in the facial or UE muscles  Tone normal overall   Nursing note and vitals reviewed.           Significant Labs:   CBC:   Recent Labs  Lab 07/12/18  0400 07/12/18  1817 07/13/18  0317   WBC 14.12* 14.23* 17.12*   HGB 8.5* 9.1* 8.8*   HCT 25.9* 27.9* 27.2*    181 201     CMP:   Recent Labs  Lab 07/12/18  0400  07/12/18  1109   [FreeTextEntry1] : 1. HTN: on medications, no SE noted. \par 2. Hyperlipidemia: on statin, lipid profile is followed by PMD.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 Patient is on medical therapy and recently underwent cardiac cath which showed severe OPM2 stent. He underwent PCI. ET is limited by leg pain.\par 4. PVD: patient has deep fem disease on the R on US in 2015. His claudication has improved post PTCA. \par He is able to walk but has claudication.  07/12/18  1817 07/13/18  0317 07/13/18  1159   *  < > 158*  < > 140* 154* 159*   *  < > 131*  < > 131* 132* 131*   K 4.5  < > 4.6  < > 4.7 4.9 4.9   CL 97  < > 96  < > 95 96 95   CO2 27  < > 25  < > 24 25 25   BUN 23*  < > 27*  < > 31* 36* 42*   CREATININE 1.6*  < > 1.8*  < > 1.9* 1.9* 2.0*   CALCIUM 8.7  < > 8.4*  < > 8.6* 8.6* 8.4*   MG 2.1  --   --   --  2.2 1.9  --    PROT 5.5*  < > 5.4*  --  5.8* 5.8*  --    ALBUMIN 2.8*  < > 2.7*  --  2.9* 2.8*  --    BILITOT 0.4  < > 0.3  --  0.3 0.3  --    ALKPHOS 64  < > 63  --  67 70  --    AST 25  < > 21  --  21 18  --    ALT 33  < > 30  --  33 30  --    ANIONGAP 9  < > 10  < > 12 11 11   EGFRNONAA 47.8*  < > 41.4*  < > 38.8* 38.8* 36.5*   < > = values in this interval not displayed.    Significant Imaging: CT: I have reviewed all pertinent results/findings within the past 24 hours and my personal findings are:  CTH showed L parieto-occipital encephalomalacia w/o any acute intracranial pathology  CXR: I have reviewed all pertinent results/findings within the past 24 hours and my personal findings are:  bilateral infiltrations and nodular opacities + right-sided pleural effusion  EEG: I have reviewed all pertinent results/findings within the past 24 hours and my personal findings are:  pending read    Assessment and Plan:     Seizure disorder    2006 after stroke  7//2018:  Twitching right face, upper extremities 5 min, during admit for heart failure, ARF      Most likely lower threshold for seizures due to medical illness (hypoxia, renal failure, electrolyte abnormalities) as well as recent change in AEDs.  currently unable to examine properly due to sedation, however no twitching movements in facial muscles or abnormal eye movements were noted    Recommendations:  - obtained CTH > w/o acute intracranial pathology  - EEG pending, will f/u the results  - please try to address electrolytes derangements  - levetiracetam was increased to 1 gr q12 yesterday,  continue current dose          History of stroke    Nidus for the seizures  Most recent A1C on 6/23/18: 6.5, glucose running ~150-160 during this admission  Most recent lipid panel on 7/27/18: , Chol 205  - continue on secondary stroke prevention with ASA and high dose statin daily        Acute renal failure    Most likely due to hypoperfusion due to cardiogenic shock likely accompanied by volume depletion due to GI bleeding  - initial Cr 2.8 (baseline 1.1)  - has been trending down, this morning 1.9  - will f/u and adjust keppra accordingly            VTE Risk Mitigation     None          Thank you for your consult. I will follow-up with patient. Please contact us if you have any additional questions.    Savanah Eden MD  Neurology  [facility]

## 2019-06-05 NOTE — DISCUSSION/SUMMARY
[FreeTextEntry1] : 79 M HTN Hyperlipidemia CAD s/p PCI PVD for f/u.\par Continue meds for HTN.\par Continue statin.\par ASA and plavix for CAD.\par Pletal for PVD.\par Continue exercise and diet.

## 2019-06-05 NOTE — REASON FOR VISIT
[Follow-Up - Clinic] : a clinic follow-up of [Hyperlipidemia] : hyperlipidemia [Coronary Artery Disease] : coronary artery disease [Hypertension] : hypertension [Peripheral Vascular Disease] : peripheral vascular disease [FreeTextEntry1] : 79 M HTN hyperlipidemia PVD CAD s/p PCI for f/u.

## 2019-07-10 ENCOUNTER — APPOINTMENT (OUTPATIENT)
Dept: CARDIOLOGY | Facility: CLINIC | Age: 79
End: 2019-07-10
Payer: MEDICARE

## 2019-07-10 VITALS
RESPIRATION RATE: 15 BRPM | SYSTOLIC BLOOD PRESSURE: 125 MMHG | DIASTOLIC BLOOD PRESSURE: 56 MMHG | HEIGHT: 67 IN | WEIGHT: 180 LBS | HEART RATE: 82 BPM | BODY MASS INDEX: 28.25 KG/M2

## 2019-07-10 PROCEDURE — 99214 OFFICE O/P EST MOD 30 MIN: CPT

## 2019-07-10 PROCEDURE — 93000 ELECTROCARDIOGRAM COMPLETE: CPT

## 2019-07-10 NOTE — PHYSICAL EXAM
[General Appearance - Well Developed] : well developed [Well Groomed] : well groomed [Normal Appearance] : normal appearance [General Appearance - Well Nourished] : well nourished [No Deformities] : no deformities [Normal Conjunctiva] : the conjunctiva exhibited no abnormalities [General Appearance - In No Acute Distress] : no acute distress [Eyelids - No Xanthelasma] : the eyelids demonstrated no xanthelasmas [Normal Oral Mucosa] : normal oral mucosa [No Oral Pallor] : no oral pallor [Normal Jugular Venous A Waves Present] : normal jugular venous A waves present [No Oral Cyanosis] : no oral cyanosis [Normal Jugular Venous V Waves Present] : normal jugular venous V waves present [No Jugular Venous Weiner A Waves] : no jugular venous weiner A waves [Respiration, Rhythm And Depth] : normal respiratory rhythm and effort [Exaggerated Use Of Accessory Muscles For Inspiration] : no accessory muscle use [Auscultation Breath Sounds / Voice Sounds] : lungs were clear to auscultation bilaterally [Heart Rate And Rhythm] : heart rate and rhythm were normal [Heart Sounds] : normal S1 and S2 [Murmurs] : no murmurs present [Abdomen Tenderness] : non-tender [Abdomen Soft] : soft [Abdomen Mass (___ Cm)] : no abdominal mass palpated [Nail Clubbing] : no clubbing of the fingernails [Cyanosis, Localized] : no localized cyanosis [Petechial Hemorrhages (___cm)] : no petechial hemorrhages [Skin Color & Pigmentation] : normal skin color and pigmentation [] : no rash [No Venous Stasis] : no venous stasis [Skin Lesions] : no skin lesions [No Skin Ulcers] : no skin ulcer [No Xanthoma] : no  xanthoma was observed [Oriented To Time, Place, And Person] : oriented to person, place, and time [Affect] : the affect was normal [Mood] : the mood was normal [No Anxiety] : not feeling anxious

## 2019-07-10 NOTE — REASON FOR VISIT
[Follow-Up - Clinic] : a clinic follow-up of [Coronary Artery Disease] : coronary artery disease [Hypertension] : hypertension [Hyperlipidemia] : hyperlipidemia [Peripheral Vascular Disease] : peripheral vascular disease [FreeTextEntry1] : 79 M HTN hyperlipidemia PVD CAD s/p PCI for f/u. \par  \par

## 2019-07-10 NOTE — HISTORY OF PRESENT ILLNESS
[FreeTextEntry1] : 1. HTN: on medications, compliant with meds. \par 2. Hyperlipidemia: on statin, lipid profile is followed by PMD. No muscle pain is noted.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 Patient is on medical therapy and recently underwent cardiac cath which showed severe OPM2 stent. He underwent PCI. Patient continues to have claudication and is scheduled for a peripheral angiogram with an outside vascular specialist.\par 4. PVD: patient has deep fem disease on the R on US in 2015. His claudication has improved post PTCA. \par He is able to walk but has claudication. \par  \par

## 2019-07-10 NOTE — DISCUSSION/SUMMARY
[FreeTextEntry1] : 79 M HTN hyperlipidemia CAD s/p PCI PVD for f/u.\par Continue meds for HTN.\par Continue statin.\par ASA and plavix for CAD.\par Monitor PVD symptoms for now.

## 2019-08-07 ENCOUNTER — APPOINTMENT (OUTPATIENT)
Dept: CARDIOLOGY | Facility: CLINIC | Age: 79
End: 2019-08-07
Payer: MEDICARE

## 2019-08-07 VITALS
HEART RATE: 76 BPM | WEIGHT: 181 LBS | HEIGHT: 67 IN | SYSTOLIC BLOOD PRESSURE: 112 MMHG | RESPIRATION RATE: 16 BRPM | BODY MASS INDEX: 28.41 KG/M2 | DIASTOLIC BLOOD PRESSURE: 58 MMHG

## 2019-08-07 PROCEDURE — 99215 OFFICE O/P EST HI 40 MIN: CPT

## 2019-08-07 NOTE — PHYSICAL EXAM
[General Appearance - Well Developed] : well developed [Normal Appearance] : normal appearance [General Appearance - Well Nourished] : well nourished [Well Groomed] : well groomed [No Deformities] : no deformities [General Appearance - In No Acute Distress] : no acute distress [Eyelids - No Xanthelasma] : the eyelids demonstrated no xanthelasmas [Normal Conjunctiva] : the conjunctiva exhibited no abnormalities [Normal Oral Mucosa] : normal oral mucosa [No Oral Pallor] : no oral pallor [No Oral Cyanosis] : no oral cyanosis [Normal Jugular Venous A Waves Present] : normal jugular venous A waves present [No Jugular Venous Weiner A Waves] : no jugular venous weiner A waves [Normal Jugular Venous V Waves Present] : normal jugular venous V waves present [Respiration, Rhythm And Depth] : normal respiratory rhythm and effort [Auscultation Breath Sounds / Voice Sounds] : lungs were clear to auscultation bilaterally [Exaggerated Use Of Accessory Muscles For Inspiration] : no accessory muscle use [Heart Sounds] : normal S1 and S2 [Murmurs] : no murmurs present [Heart Rate And Rhythm] : heart rate and rhythm were normal [Abdomen Soft] : soft [Abdomen Tenderness] : non-tender [Abdomen Mass (___ Cm)] : no abdominal mass palpated [Cyanosis, Localized] : no localized cyanosis [Nail Clubbing] : no clubbing of the fingernails [Petechial Hemorrhages (___cm)] : no petechial hemorrhages [Skin Color & Pigmentation] : normal skin color and pigmentation [] : no rash [No Venous Stasis] : no venous stasis [Skin Lesions] : no skin lesions [No Skin Ulcers] : no skin ulcer [No Xanthoma] : no  xanthoma was observed [Oriented To Time, Place, And Person] : oriented to person, place, and time [Affect] : the affect was normal [Mood] : the mood was normal [No Anxiety] : not feeling anxious

## 2019-08-07 NOTE — DISCUSSION/SUMMARY
[FreeTextEntry1] : 79 M HTN DM hyperlipidemia CAD s/p PCI PVD with worsening CP.\par CHECK ECHOCArDIOGRAM.\par CHECK NST TO ASSESS PERFUSION. (limited ET due to PVD).\par Continue ASA and plavix.\par Continue meds for HTN.

## 2019-08-07 NOTE — REASON FOR VISIT
[Follow-Up - Clinic] : a clinic follow-up of [Coronary Artery Disease] : coronary artery disease [Hyperlipidemia] : hyperlipidemia [Hypertension] : hypertension [Peripheral Vascular Disease] : peripheral vascular disease [FreeTextEntry1] : 79 M HTN hyperlipidemia PVD CAD s/p PCI for f/u. \par

## 2019-08-07 NOTE — HISTORY OF PRESENT ILLNESS
[FreeTextEntry1] : 1. HTN: on medications, no SE noted. \par 2. Hyperlipidemia: on statin.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 Patient is on medical therapy and recently underwent cardiac cath which showed severe OPM2 stent. He underwent PCI. He continues to have exertional SOb and CP. CP is midline substernal, exertional relieved with rest and NG and occurring several times/ week. It is increasing in frequency and duration and lasts seconds.\par 4. PVD: patient has deep fem disease on the R on US in 2015. He had PTCA in 7/2019 at a hospital in [place].

## 2019-09-17 ENCOUNTER — APPOINTMENT (OUTPATIENT)
Dept: CARDIOLOGY | Facility: CLINIC | Age: 79
End: 2019-09-17
Payer: MEDICARE

## 2019-09-17 PROCEDURE — A9500: CPT

## 2019-09-17 PROCEDURE — 93306 TTE W/DOPPLER COMPLETE: CPT

## 2019-09-17 PROCEDURE — 93015 CV STRESS TEST SUPVJ I&R: CPT

## 2019-09-17 PROCEDURE — 78452 HT MUSCLE IMAGE SPECT MULT: CPT

## 2019-09-25 ENCOUNTER — APPOINTMENT (OUTPATIENT)
Dept: CARDIOLOGY | Facility: CLINIC | Age: 79
End: 2019-09-25
Payer: MEDICARE

## 2019-09-25 VITALS
BODY MASS INDEX: 28.25 KG/M2 | SYSTOLIC BLOOD PRESSURE: 112 MMHG | DIASTOLIC BLOOD PRESSURE: 78 MMHG | HEIGHT: 67 IN | WEIGHT: 180 LBS | RESPIRATION RATE: 12 BRPM | HEART RATE: 72 BPM

## 2019-09-25 PROCEDURE — 99215 OFFICE O/P EST HI 40 MIN: CPT

## 2019-09-25 NOTE — HISTORY OF PRESENT ILLNESS
A nonstress test was performed and found to be reactive. An ultrasound revealed that the baby was brea breech with the head in the maternal left upper quadrant. Informed consent was obtained. 0.25mg terbutaline was given subcutaneously.  Gel was applied to
[FreeTextEntry1] : 1. HTN: on medications, no SE noted. Compliant with medications.\par 2. Hyperlipidemia: on statin. 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 Patient is on medical therapy and recently underwent cardiac cath which showed severe OPM2 stent. He underwent PCI. He continues to have exertional SOb and CP. CP is midline substernal, exertional relieved with rest and NG and occurring several times/ week. It is increasing in frequency and duration and lasts seconds. He underwent NST which showed moderate inferior ischemia. \par 4. PVD: patient has deep fem disease on the R on US in 2015. He had PTCA in 7/2019 at a hospital in [place]. \par Claudication is stable.

## 2019-09-25 NOTE — PHYSICAL EXAM
[General Appearance - Well Developed] : well developed [Normal Appearance] : normal appearance [Well Groomed] : well groomed [General Appearance - Well Nourished] : well nourished [No Deformities] : no deformities [General Appearance - In No Acute Distress] : no acute distress [Eyelids - No Xanthelasma] : the eyelids demonstrated no xanthelasmas [Normal Conjunctiva] : the conjunctiva exhibited no abnormalities [Normal Oral Mucosa] : normal oral mucosa [No Oral Pallor] : no oral pallor [No Oral Cyanosis] : no oral cyanosis [Normal Jugular Venous A Waves Present] : normal jugular venous A waves present [Normal Jugular Venous V Waves Present] : normal jugular venous V waves present [No Jugular Venous Weiner A Waves] : no jugular venous weiner A waves [Respiration, Rhythm And Depth] : normal respiratory rhythm and effort [Exaggerated Use Of Accessory Muscles For Inspiration] : no accessory muscle use [Auscultation Breath Sounds / Voice Sounds] : lungs were clear to auscultation bilaterally [Heart Rate And Rhythm] : heart rate and rhythm were normal [Heart Sounds] : normal S1 and S2 [Murmurs] : no murmurs present [Abdomen Soft] : soft [Abdomen Tenderness] : non-tender [Abdomen Mass (___ Cm)] : no abdominal mass palpated [Nail Clubbing] : no clubbing of the fingernails [Cyanosis, Localized] : no localized cyanosis [Petechial Hemorrhages (___cm)] : no petechial hemorrhages [Skin Color & Pigmentation] : normal skin color and pigmentation [] : no rash [No Venous Stasis] : no venous stasis [Skin Lesions] : no skin lesions [No Skin Ulcers] : no skin ulcer [No Xanthoma] : no  xanthoma was observed [Oriented To Time, Place, And Person] : oriented to person, place, and time [Mood] : the mood was normal [Affect] : the affect was normal [No Anxiety] : not feeling anxious

## 2019-09-25 NOTE — DISCUSSION/SUMMARY
[FreeTextEntry1] : 79 M HTN hyperlipidemia CAD PCI PVD with CP and abnormal NST.\par REFER FOR CARDIAC CATHETERIZATION STAT.\par Continue meds for HTN.\par Continue statin.\par Continue ASA and plavix for CAD.

## 2019-09-25 NOTE — REASON FOR VISIT
[Follow-Up - Clinic] : a clinic follow-up of [Coronary Artery Disease] : coronary artery disease [Hyperlipidemia] : hyperlipidemia [Hypertension] : hypertension [Peripheral Vascular Disease] : peripheral vascular disease [FreeTextEntry1] : 79 M HTN hyperlipidemia PVD CAD s/p PCI for f/u. \par \par

## 2019-09-30 VITALS
RESPIRATION RATE: 16 BRPM | SYSTOLIC BLOOD PRESSURE: 109 MMHG | TEMPERATURE: 97 F | OXYGEN SATURATION: 98 % | DIASTOLIC BLOOD PRESSURE: 69 MMHG | HEART RATE: 56 BPM

## 2019-09-30 RX ORDER — GLUCAGON INJECTION, SOLUTION 0.5 MG/.1ML
1 INJECTION, SOLUTION SUBCUTANEOUS ONCE
Refills: 0 | Status: DISCONTINUED | OUTPATIENT
Start: 2019-10-08 | End: 2019-10-09

## 2019-09-30 RX ORDER — SODIUM CHLORIDE 9 MG/ML
1000 INJECTION, SOLUTION INTRAVENOUS
Refills: 0 | Status: DISCONTINUED | OUTPATIENT
Start: 2019-10-08 | End: 2019-10-09

## 2019-09-30 RX ORDER — CHLORHEXIDINE GLUCONATE 213 G/1000ML
1 SOLUTION TOPICAL ONCE
Refills: 0 | Status: DISCONTINUED | OUTPATIENT
Start: 2019-10-08 | End: 2019-10-09

## 2019-09-30 RX ORDER — DEXTROSE 50 % IN WATER 50 %
12.5 SYRINGE (ML) INTRAVENOUS ONCE
Refills: 0 | Status: DISCONTINUED | OUTPATIENT
Start: 2019-10-08 | End: 2019-10-09

## 2019-09-30 RX ORDER — CYCLOSPORINE 0.5 MG/ML
1 EMULSION OPHTHALMIC
Qty: 0 | Refills: 0 | DISCHARGE

## 2019-09-30 RX ORDER — SENNA PLUS 8.6 MG/1
2 TABLET ORAL
Qty: 0 | Refills: 0 | DISCHARGE

## 2019-09-30 RX ORDER — ICOSAPENT ETHYL 500 MG/1
2 CAPSULE, LIQUID FILLED ORAL
Qty: 0 | Refills: 0 | DISCHARGE

## 2019-09-30 RX ORDER — LINAGLIPTIN 5 MG/1
1 TABLET, FILM COATED ORAL
Qty: 0 | Refills: 0 | DISCHARGE

## 2019-09-30 RX ORDER — INSULIN LISPRO 100/ML
VIAL (ML) SUBCUTANEOUS
Refills: 0 | Status: DISCONTINUED | OUTPATIENT
Start: 2019-10-08 | End: 2019-10-09

## 2019-09-30 RX ORDER — DEXTROSE 50 % IN WATER 50 %
15 SYRINGE (ML) INTRAVENOUS ONCE
Refills: 0 | Status: DISCONTINUED | OUTPATIENT
Start: 2019-10-08 | End: 2019-10-09

## 2019-09-30 NOTE — H&P ADULT - ASSESSMENT
79 year old Japanese Speaking male, former smoker, with PMHx of HTN, HLD, type 2 DM, PAD (s/p BL SFA stents; peripheral angio 9/5/18 @ [facility]  atherectomy/drug-coated balloon LSFA and PTA of left popliteal artery; most recent PTCA 07/2019 at OSH per MD notes) and CAD s/p MI with multiple PCIs (most recent LHC @ [facility] 07/2018- PTCA of OM2 ISR) who presents to [facility] for cardiac catheterization with PCI as clinically indicated in the setting of risk factors, known CAD, CCS anginal class III symptoms, and abnormal NST.    ASA Class III  Mallampati Class III  NS 75 cc/hr.   Patient took his morning doses of aspirin 81 mg and plavix 75 mg this am. He reports no missed doses in the last week.     Risks & benefits of procedure and alternative therapy have been explained to the patient including but not limited to: allergic reaction, bleeding with possible need for blood transfusion, infection, renal and vascular compromise, limb damage, arrhythmia, stroke, vessel dissection/perforation, Myocardial infarction, emergent CABG. Informed consent obtained and in chart.       Consent obtained with pacific  [id number]

## 2019-09-30 NOTE — H&P ADULT - HISTORY OF PRESENT ILLNESS
SKELETON    79 year old Maltese Speaking male, former smoker, with PMHx of HTN, HLD, type 2 DM, PAD (s/p BL SFA stents; peripheral angio 9/5/18 @ [facility] atherectomy/drug-coated balloon LSFA; PTCA 07/2019 at OSH)  and CAD s/p MI with multiple PCIs (last PTCA OM2 7/10/18) who presented to Dr. Marie c/o exertional chest pain/SOB. Chest pain is substernal, relieved with rest and NG, lasting seconds, occurring several times per week. It has been increasing in duration/frequency.     Patient denies palpitations, diaphoresis, N/V, PND/orthopnea, LE edema, dizziness, or syncope.     Patient subsequently underwent NST 09/17/19 revealing small, mild, reversible defect in inferolateral wall and TTE 09/17/19 revealing LVEF 70%, no WMA, mild diastolic dysfunction, mild MR.     In light of patient's risk factors, known CAD, CCS anginal class III symptoms, and abnormal NST, patient is now referred for cardiac cath with possible intervention if clinically indicated. VERIFY MEDS    79 year old Yi Speaking male, former smoker, with PMHx of HTN, HLD, type 2 DM, PAD (s/p BL SFA stents; peripheral angio 9/5/18 @ [facility]  atherectomy/drug-coated balloon LSFA and PTA of left popliteal artery; most recent PTCA 07/2019 at OSH per MD notes) and CAD s/p MI with multiple PCIs (most recent LHC @ [facility] 07/2018- PTCA of OM2 ISR) who presented to Dr. Marie c/o worsening exertional chest pain/SOB, described as substernal, relieved with rest and NG, lasting seconds, occurring several times per week. Patient denies palpitations, diaphoresis, N/V, PND/orthopnea, LE edema, dizziness, or syncope. Patient subsequently underwent NST 09/17/19 revealing small, mild, reversible defect in inferolateral wall and TTE 09/17/19 revealing LVEF 70%, no WMA, mild diastolic dysfunction, mild MR.     In light of patient's risk factors, known CAD, CCS anginal class III symptoms, and abnormal NST, patient is now referred for cardiac cath with possible intervention if clinically indicated. 79 year old Portuguese Speaking male, former smoker, with PMHx of HTN, HLD, type 2 DM, PAD (s/p BL SFA stents; peripheral angio 9/5/18 @ [facility]  atherectomy/drug-coated balloon LSFA and PTA of left popliteal artery; most recent PTCA 07/2019 at OSH per MD notes) and CAD s/p MI with multiple PCIs (most recent LHC @ [facility] 07/2018- PTCA of OM2 ISR) who presented to Dr. Marie c/o worsening exertional chest pain/SOB, described as substernal, relieved with rest and NG, lasting seconds, occurring several times per week. Patient denies palpitations, diaphoresis, N/V, PND/orthopnea, LE edema, dizziness, or syncope. Patient subsequently underwent NST 09/17/19 revealing small, mild, reversible defect in inferolateral wall and TTE 09/17/19 revealing LVEF 70%, no WMA, mild diastolic dysfunction, mild MR.     Cardiac cath 7/10/19:  LMCA mild disease, LAD patent prox and mid stents, distal LAD with minimal irregularities. LCx mild luminal irregularities. Patent OM1 stent. OM2 with 80% instent restenosis. RCA small, non-dominant. EF 65%, EDP normal. Mild MR, no AS. PCTA of OM2 ISR.     In light of patient's risk factors, known CAD, CCS anginal class III symptoms, and abnormal NST, patient is now referred for cardiac cath with possible intervention if clinically indicated.

## 2019-09-30 NOTE — H&P ADULT - NSHPLABSRESULTS_GEN_ALL_CORE
12.3   6.62  )-----------( 147      ( 08 Oct 2019 12:32 )             38.5    10-08    142  |  102  |  17  ----------------------------<  112<H>  4.2   |  28  |  1.05    Ca    9.5      08 Oct 2019 12:32    TPro  7.8  /  Alb  4.7  /  TBili  0.4  /  DBili  <0.2  /  AST  18  /  ALT  20  /  AlkPhos  60  10-08      PT/INR - ( 08 Oct 2019 12:32 )   PT: 11.8 sec;   INR: 1.04          PTT - ( 08 Oct 2019 12:32 )  PTT:33.0 sec    EKG: Sinus yolanda at 55 BPM. No evidence of acute ischemia.

## 2019-09-30 NOTE — H&P ADULT - NSICDXPASTSURGICALHX_GEN_ALL_CORE_FT
PAST SURGICAL HISTORY:  H/O coronary angioplasty     History of appendectomy     History of vocal cord polypectomy     S/P peripheral artery angioplasty with SFA stents  "I'm not sure" PAST SURGICAL HISTORY:  H/O coronary angioplasty     History of appendectomy     History of vocal cord polypectomy     S/P peripheral artery angioplasty

## 2019-09-30 NOTE — H&P ADULT - NSICDXPASTMEDICALHX_GEN_ALL_CORE_FT
PAST MEDICAL HISTORY:  CAD (coronary artery disease) 5 stents, last one placed around 2014    DM (diabetes mellitus) type 2    Dysphagia     Dysphonia     Former smoker     HLD (hyperlipidemia)     HTN (hypertension)     MI, old patient denies    PVD (peripheral vascular disease)     Stented coronary artery PAST MEDICAL HISTORY:  Coronary artery disease s/p MI and multiple PCI    HLD (hyperlipidemia)     HTN (hypertension)     PVD (peripheral vascular disease)     Type 2 diabetes mellitus

## 2019-09-30 NOTE — H&P ADULT - RS GEN PE MLT RESP DETAILS PC
clear to auscultation bilaterally/respirations non-labored/breath sounds equal/no rhonchi/no rales/no wheezes/good air movement

## 2019-09-30 NOTE — H&P ADULT - NSHPSOCIALHISTORY_GEN_ALL_CORE
Former 1.5PPD smoker (quit approx 20 years ago), rare ETOH use, no illicits Former 1.5PPD smoker (quit approx 20 years ago), rare ETOH use, no illicit drug use

## 2019-09-30 NOTE — H&P ADULT - ATTENDING COMMENTS
Please see housestaff note for full details.  79 M HTN hyperlipidemia PVD CAD s/p PCI DM with recurrent angina and abnormal NST.   patient had ISR of LCx s/p PCI.  Patient needs brachytherapy to be scheduled.  Continue ASA and plavix.  Continue ARB and statin.  Continue DM control.

## 2019-10-02 ENCOUNTER — APPOINTMENT (OUTPATIENT)
Dept: CARDIOLOGY | Facility: CLINIC | Age: 79
End: 2019-10-02
Payer: MEDICARE

## 2019-10-02 VITALS
SYSTOLIC BLOOD PRESSURE: 114 MMHG | RESPIRATION RATE: 16 BRPM | HEART RATE: 81 BPM | HEIGHT: 67 IN | WEIGHT: 181 LBS | BODY MASS INDEX: 28.41 KG/M2 | DIASTOLIC BLOOD PRESSURE: 60 MMHG

## 2019-10-02 PROBLEM — E11.9 TYPE 2 DIABETES MELLITUS WITHOUT COMPLICATIONS: Chronic | Status: ACTIVE | Noted: 2019-09-30

## 2019-10-02 PROCEDURE — 99214 OFFICE O/P EST MOD 30 MIN: CPT

## 2019-10-02 NOTE — HISTORY OF PRESENT ILLNESS
[FreeTextEntry1] : 1. HTN: on medications, controlled. \par 2. Hyperlipidemia: on statin.  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 Patient is on medical therapy and recently underwent cardiac cath which showed severe OPM2 stent. He underwent PCI. He continues to have exertional SOb and CP. CP is midline substernal, exertional relieved with rest and NG and occurring several times/ week. It is increasing in frequency and duration and lasts seconds. He underwent NST which showed moderate inferior ischemia. He continues to have exertional CP. He was not able to make it to his cath yesterday. \par 4. PVD: patient has deep fem disease on the R on US in 2015. He had PTCA in 7/2019 at a hospital in [place]. \par Claudication is stable. Patient reports intermittent claudication.

## 2019-10-02 NOTE — REASON FOR VISIT
[Follow-Up - Clinic] : a clinic follow-up of [Coronary Artery Disease] : coronary artery disease [Hyperlipidemia] : hyperlipidemia [Peripheral Vascular Disease] : peripheral vascular disease [Hypertension] : hypertension [FreeTextEntry1] : 79 M HTN hyperlipidemia PVD CAD s/p PCI for f/u. \par

## 2019-10-02 NOTE — DISCUSSION/SUMMARY
[FreeTextEntry1] : 79 M HTN hyperlipidemia CAD s/p PCI PVD for f/u.\par Continue meds for HTN.\par Continue statin.\par ASA and plavix for CAD. \par Due to abnormal NST and CP, REFER FOR CARDIAC CATH.

## 2019-10-02 NOTE — PHYSICAL EXAM
[General Appearance - Well Developed] : well developed [Well Groomed] : well groomed [Normal Appearance] : normal appearance [No Deformities] : no deformities [General Appearance - Well Nourished] : well nourished [General Appearance - In No Acute Distress] : no acute distress [Normal Conjunctiva] : the conjunctiva exhibited no abnormalities [Eyelids - No Xanthelasma] : the eyelids demonstrated no xanthelasmas [Normal Oral Mucosa] : normal oral mucosa [No Oral Pallor] : no oral pallor [No Oral Cyanosis] : no oral cyanosis [Normal Jugular Venous A Waves Present] : normal jugular venous A waves present [Normal Jugular Venous V Waves Present] : normal jugular venous V waves present [No Jugular Venous Weiner A Waves] : no jugular venous weiner A waves [Respiration, Rhythm And Depth] : normal respiratory rhythm and effort [Exaggerated Use Of Accessory Muscles For Inspiration] : no accessory muscle use [Auscultation Breath Sounds / Voice Sounds] : lungs were clear to auscultation bilaterally [Heart Rate And Rhythm] : heart rate and rhythm were normal [Heart Sounds] : normal S1 and S2 [Murmurs] : no murmurs present [Abdomen Soft] : soft [Abdomen Tenderness] : non-tender [Abdomen Mass (___ Cm)] : no abdominal mass palpated [Nail Clubbing] : no clubbing of the fingernails [Cyanosis, Localized] : no localized cyanosis [Petechial Hemorrhages (___cm)] : no petechial hemorrhages [Skin Color & Pigmentation] : normal skin color and pigmentation [] : no rash [No Venous Stasis] : no venous stasis [Skin Lesions] : no skin lesions [No Skin Ulcers] : no skin ulcer [No Xanthoma] : no  xanthoma was observed [Oriented To Time, Place, And Person] : oriented to person, place, and time [Affect] : the affect was normal [Mood] : the mood was normal [No Anxiety] : not feeling anxious

## 2019-10-08 ENCOUNTER — INPATIENT (INPATIENT)
Facility: HOSPITAL | Age: 79
LOS: 0 days | Discharge: HOME | DRG: 247 | End: 2019-10-09
Attending: INTERNAL MEDICINE | Admitting: INTERNAL MEDICINE
Payer: MEDICARE

## 2019-10-08 DIAGNOSIS — Z98.890 OTHER SPECIFIED POSTPROCEDURAL STATES: Chronic | ICD-10-CM

## 2019-10-08 DIAGNOSIS — Z98.89 OTHER SPECIFIED POSTPROCEDURAL STATES: Chronic | ICD-10-CM

## 2019-10-08 DIAGNOSIS — Z98.61 CORONARY ANGIOPLASTY STATUS: Chronic | ICD-10-CM

## 2019-10-08 LAB
ALBUMIN SERPL ELPH-MCNC: 4.7 G/DL — SIGNIFICANT CHANGE UP (ref 3.3–5)
ALP SERPL-CCNC: 60 U/L — SIGNIFICANT CHANGE UP (ref 40–120)
ALT FLD-CCNC: 20 U/L — SIGNIFICANT CHANGE UP (ref 10–45)
ANION GAP SERPL CALC-SCNC: 12 MMOL/L — SIGNIFICANT CHANGE UP (ref 5–17)
APTT BLD: 33 SEC — SIGNIFICANT CHANGE UP (ref 27.5–36.3)
AST SERPL-CCNC: 18 U/L — SIGNIFICANT CHANGE UP (ref 10–40)
BASOPHILS # BLD AUTO: 0.06 K/UL — SIGNIFICANT CHANGE UP (ref 0–0.2)
BASOPHILS NFR BLD AUTO: 0.9 % — SIGNIFICANT CHANGE UP (ref 0–2)
BILIRUB SERPL-MCNC: 0.4 MG/DL — SIGNIFICANT CHANGE UP (ref 0.2–1.2)
BUN SERPL-MCNC: 17 MG/DL — SIGNIFICANT CHANGE UP (ref 7–23)
CALCIUM SERPL-MCNC: 9.5 MG/DL — SIGNIFICANT CHANGE UP (ref 8.4–10.5)
CHLORIDE SERPL-SCNC: 102 MMOL/L — SIGNIFICANT CHANGE UP (ref 96–108)
CHOLEST SERPL-MCNC: 98 MG/DL — SIGNIFICANT CHANGE UP (ref 10–199)
CK MB CFR SERPL CALC: 1.3 NG/ML — SIGNIFICANT CHANGE UP (ref 0–6.7)
CK SERPL-CCNC: 121 U/L — SIGNIFICANT CHANGE UP (ref 30–200)
CO2 SERPL-SCNC: 28 MMOL/L — SIGNIFICANT CHANGE UP (ref 22–31)
CREAT SERPL-MCNC: 1.05 MG/DL — SIGNIFICANT CHANGE UP (ref 0.5–1.3)
EOSINOPHIL # BLD AUTO: 0.06 K/UL — SIGNIFICANT CHANGE UP (ref 0–0.5)
EOSINOPHIL NFR BLD AUTO: 0.9 % — SIGNIFICANT CHANGE UP (ref 0–6)
GLUCOSE SERPL-MCNC: 112 MG/DL — HIGH (ref 70–99)
HBA1C BLD-MCNC: 8.1 % — HIGH (ref 4–5.6)
HCT VFR BLD CALC: 38.5 % — LOW (ref 39–50)
HDLC SERPL-MCNC: 41 MG/DL — SIGNIFICANT CHANGE UP
HGB BLD-MCNC: 12.3 G/DL — LOW (ref 13–17)
IMM GRANULOCYTES NFR BLD AUTO: 0.3 % — SIGNIFICANT CHANGE UP (ref 0–1.5)
INR BLD: 1.04 — SIGNIFICANT CHANGE UP (ref 0.88–1.16)
LIPID PNL WITH DIRECT LDL SERPL: 38 MG/DL — SIGNIFICANT CHANGE UP
LYMPHOCYTES # BLD AUTO: 1.59 K/UL — SIGNIFICANT CHANGE UP (ref 1–3.3)
LYMPHOCYTES # BLD AUTO: 24 % — SIGNIFICANT CHANGE UP (ref 13–44)
MCHC RBC-ENTMCNC: 28.7 PG — SIGNIFICANT CHANGE UP (ref 27–34)
MCHC RBC-ENTMCNC: 31.9 GM/DL — LOW (ref 32–36)
MCV RBC AUTO: 90 FL — SIGNIFICANT CHANGE UP (ref 80–100)
MONOCYTES # BLD AUTO: 0.42 K/UL — SIGNIFICANT CHANGE UP (ref 0–0.9)
MONOCYTES NFR BLD AUTO: 6.3 % — SIGNIFICANT CHANGE UP (ref 2–14)
NEUTROPHILS # BLD AUTO: 4.47 K/UL — SIGNIFICANT CHANGE UP (ref 1.8–7.4)
NEUTROPHILS NFR BLD AUTO: 67.6 % — SIGNIFICANT CHANGE UP (ref 43–77)
NRBC # BLD: 0 /100 WBCS — SIGNIFICANT CHANGE UP (ref 0–0)
PLATELET # BLD AUTO: 147 K/UL — LOW (ref 150–400)
POTASSIUM SERPL-MCNC: 4.2 MMOL/L — SIGNIFICANT CHANGE UP (ref 3.5–5.3)
POTASSIUM SERPL-SCNC: 4.2 MMOL/L — SIGNIFICANT CHANGE UP (ref 3.5–5.3)
PROT SERPL-MCNC: 7.8 G/DL — SIGNIFICANT CHANGE UP (ref 6–8.3)
PROTHROM AB SERPL-ACNC: 11.8 SEC — SIGNIFICANT CHANGE UP (ref 10–12.9)
RBC # BLD: 4.28 M/UL — SIGNIFICANT CHANGE UP (ref 4.2–5.8)
RBC # FLD: 13.2 % — SIGNIFICANT CHANGE UP (ref 10.3–14.5)
SODIUM SERPL-SCNC: 142 MMOL/L — SIGNIFICANT CHANGE UP (ref 135–145)
TOTAL CHOLESTEROL/HDL RATIO MEASUREMENT: 2.4 RATIO — LOW (ref 3.4–9.6)
TRIGL SERPL-MCNC: 94 MG/DL — SIGNIFICANT CHANGE UP (ref 10–149)
WBC # BLD: 6.62 K/UL — SIGNIFICANT CHANGE UP (ref 3.8–10.5)
WBC # FLD AUTO: 6.62 K/UL — SIGNIFICANT CHANGE UP (ref 3.8–10.5)

## 2019-10-08 PROCEDURE — 93458 L HRT ARTERY/VENTRICLE ANGIO: CPT | Mod: 26,59

## 2019-10-08 PROCEDURE — 92978 ENDOLUMINL IVUS OCT C 1ST: CPT | Mod: 26,LC

## 2019-10-08 PROCEDURE — 92920 PRQ TRLUML C ANGIOP 1ART&/BR: CPT | Mod: LC

## 2019-10-08 PROCEDURE — 99222 1ST HOSP IP/OBS MODERATE 55: CPT

## 2019-10-08 PROCEDURE — 93010 ELECTROCARDIOGRAM REPORT: CPT | Mod: 76

## 2019-10-08 RX ORDER — MAGNESIUM OXIDE 400 MG ORAL TABLET 241.3 MG
400 TABLET ORAL DAILY
Refills: 0 | Status: DISCONTINUED | OUTPATIENT
Start: 2019-10-08 | End: 2019-10-09

## 2019-10-08 RX ORDER — ATORVASTATIN CALCIUM 80 MG/1
40 TABLET, FILM COATED ORAL AT BEDTIME
Refills: 0 | Status: DISCONTINUED | OUTPATIENT
Start: 2019-10-08 | End: 2019-10-09

## 2019-10-08 RX ORDER — INFLUENZA VIRUS VACCINE 15; 15; 15; 15 UG/.5ML; UG/.5ML; UG/.5ML; UG/.5ML
0.5 SUSPENSION INTRAMUSCULAR ONCE
Refills: 0 | Status: DISCONTINUED | OUTPATIENT
Start: 2019-10-08 | End: 2019-10-09

## 2019-10-08 RX ORDER — ATENOLOL 25 MG/1
25 TABLET ORAL DAILY
Refills: 0 | Status: DISCONTINUED | OUTPATIENT
Start: 2019-10-09 | End: 2019-10-09

## 2019-10-08 RX ORDER — TAMSULOSIN HYDROCHLORIDE 0.4 MG/1
1 CAPSULE ORAL
Qty: 0 | Refills: 0 | DISCHARGE

## 2019-10-08 RX ORDER — SODIUM CHLORIDE 9 MG/ML
500 INJECTION INTRAMUSCULAR; INTRAVENOUS; SUBCUTANEOUS
Refills: 0 | Status: DISCONTINUED | OUTPATIENT
Start: 2019-10-08 | End: 2019-10-08

## 2019-10-08 RX ORDER — PANTOPRAZOLE SODIUM 20 MG/1
40 TABLET, DELAYED RELEASE ORAL
Refills: 0 | Status: DISCONTINUED | OUTPATIENT
Start: 2019-10-08 | End: 2019-10-09

## 2019-10-08 RX ORDER — LIPASE/PROTEASE/AMYLASE 16-48-48K
1 CAPSULE,DELAYED RELEASE (ENTERIC COATED) ORAL
Qty: 0 | Refills: 0 | DISCHARGE

## 2019-10-08 RX ORDER — SODIUM CHLORIDE 9 MG/ML
500 INJECTION INTRAMUSCULAR; INTRAVENOUS; SUBCUTANEOUS
Refills: 0 | Status: DISCONTINUED | OUTPATIENT
Start: 2019-10-08 | End: 2019-10-09

## 2019-10-08 RX ORDER — NITROGLYCERIN 6.5 MG
0.4 CAPSULE, EXTENDED RELEASE ORAL
Refills: 0 | Status: DISCONTINUED | OUTPATIENT
Start: 2019-10-08 | End: 2019-10-09

## 2019-10-08 RX ORDER — LOSARTAN POTASSIUM 100 MG/1
50 TABLET, FILM COATED ORAL DAILY
Refills: 0 | Status: DISCONTINUED | OUTPATIENT
Start: 2019-10-09 | End: 2019-10-09

## 2019-10-08 RX ORDER — LORATADINE 10 MG/1
10 TABLET ORAL DAILY
Refills: 0 | Status: DISCONTINUED | OUTPATIENT
Start: 2019-10-08 | End: 2019-10-09

## 2019-10-08 RX ORDER — CILOSTAZOL 100 MG/1
100 TABLET ORAL
Refills: 0 | Status: DISCONTINUED | OUTPATIENT
Start: 2019-10-08 | End: 2019-10-09

## 2019-10-08 RX ORDER — ASPIRIN/CALCIUM CARB/MAGNESIUM 324 MG
81 TABLET ORAL DAILY
Refills: 0 | Status: DISCONTINUED | OUTPATIENT
Start: 2019-10-09 | End: 2019-10-09

## 2019-10-08 RX ORDER — CLOPIDOGREL BISULFATE 75 MG/1
75 TABLET, FILM COATED ORAL DAILY
Refills: 0 | Status: DISCONTINUED | OUTPATIENT
Start: 2019-10-09 | End: 2019-10-09

## 2019-10-08 RX ORDER — ERGOCALCIFEROL 1.25 MG/1
1 CAPSULE ORAL
Qty: 0 | Refills: 0 | DISCHARGE

## 2019-10-08 RX ADMIN — Medication 0: at 16:55

## 2019-10-08 RX ADMIN — SODIUM CHLORIDE 75 MILLILITER(S): 9 INJECTION INTRAMUSCULAR; INTRAVENOUS; SUBCUTANEOUS at 18:37

## 2019-10-08 RX ADMIN — CILOSTAZOL 100 MILLIGRAM(S): 100 TABLET ORAL at 23:47

## 2019-10-08 RX ADMIN — ATORVASTATIN CALCIUM 40 MILLIGRAM(S): 80 TABLET, FILM COATED ORAL at 22:39

## 2019-10-08 RX ADMIN — SODIUM CHLORIDE 75 MILLILITER(S): 9 INJECTION INTRAMUSCULAR; INTRAVENOUS; SUBCUTANEOUS at 13:58

## 2019-10-08 NOTE — PROGRESS NOTE ADULT - SUBJECTIVE AND OBJECTIVE BOX
CORONARY ANGIOGRAPHY  10/8/19    Indication: unstable angina, abnormal stress test, hx of CAD    Conclusion  Frailty Index: 4  Syntax Score: low    1. Coronary Angiography  LM: normal  LAD: patent stent in prox and mid LAD  LCx: patent stent OM1, 90% ISR proximal OM2, LPL with mild luminal irregularities-IVUS guided-2 layers of stent in OM2, Left dominant  RCA: small, non-dominant vessel    2. Left Heart cath  LVEF 60%, normal wall motion, LVEDP 5 mmHg  no AS, no MR    3. PTCA  s/p successful IVUS guided PTCA of 90% proximal ISR of OM2 with 3.0x10 Delmita cutting balloon with residual 10% stenosis and SACHIN III flow and excellent angiographic results     Recommendations  90% ISR of OM2 stent, IVUS demonstrated 2 layers of stent, s/p PCTA with residual 10% stenosis  plan for IV Brachytherapy of ISR of OM2    Access: R radial artery, TR band

## 2019-10-09 ENCOUNTER — TRANSCRIPTION ENCOUNTER (OUTPATIENT)
Age: 79
End: 2019-10-09

## 2019-10-09 VITALS — TEMPERATURE: 98 F

## 2019-10-09 LAB
ANION GAP SERPL CALC-SCNC: 12 MMOL/L — SIGNIFICANT CHANGE UP (ref 5–17)
BUN SERPL-MCNC: 19 MG/DL — SIGNIFICANT CHANGE UP (ref 7–23)
CALCIUM SERPL-MCNC: 9 MG/DL — SIGNIFICANT CHANGE UP (ref 8.4–10.5)
CHLORIDE SERPL-SCNC: 106 MMOL/L — SIGNIFICANT CHANGE UP (ref 96–108)
CO2 SERPL-SCNC: 24 MMOL/L — SIGNIFICANT CHANGE UP (ref 22–31)
CREAT SERPL-MCNC: 0.94 MG/DL — SIGNIFICANT CHANGE UP (ref 0.5–1.3)
GLUCOSE SERPL-MCNC: 131 MG/DL — HIGH (ref 70–99)
HCT VFR BLD CALC: 38.3 % — LOW (ref 39–50)
HGB BLD-MCNC: 12.6 G/DL — LOW (ref 13–17)
MAGNESIUM SERPL-MCNC: 2.2 MG/DL — SIGNIFICANT CHANGE UP (ref 1.6–2.6)
MCHC RBC-ENTMCNC: 29.2 PG — SIGNIFICANT CHANGE UP (ref 27–34)
MCHC RBC-ENTMCNC: 32.9 GM/DL — SIGNIFICANT CHANGE UP (ref 32–36)
MCV RBC AUTO: 88.9 FL — SIGNIFICANT CHANGE UP (ref 80–100)
NRBC # BLD: 0 /100 WBCS — SIGNIFICANT CHANGE UP (ref 0–0)
PLATELET # BLD AUTO: 138 K/UL — LOW (ref 150–400)
POTASSIUM SERPL-MCNC: 4.2 MMOL/L — SIGNIFICANT CHANGE UP (ref 3.5–5.3)
POTASSIUM SERPL-SCNC: 4.2 MMOL/L — SIGNIFICANT CHANGE UP (ref 3.5–5.3)
RBC # BLD: 4.31 M/UL — SIGNIFICANT CHANGE UP (ref 4.2–5.8)
RBC # FLD: 13.2 % — SIGNIFICANT CHANGE UP (ref 10.3–14.5)
SODIUM SERPL-SCNC: 142 MMOL/L — SIGNIFICANT CHANGE UP (ref 135–145)
WBC # BLD: 6.48 K/UL — SIGNIFICANT CHANGE UP (ref 3.8–10.5)
WBC # FLD AUTO: 6.48 K/UL — SIGNIFICANT CHANGE UP (ref 3.8–10.5)

## 2019-10-09 PROCEDURE — 99239 HOSP IP/OBS DSCHRG MGMT >30: CPT

## 2019-10-09 PROCEDURE — 99232 SBSQ HOSP IP/OBS MODERATE 35: CPT | Mod: 25

## 2019-10-09 PROCEDURE — 93010 ELECTROCARDIOGRAM REPORT: CPT

## 2019-10-09 RX ORDER — LINAGLIPTIN AND METFORMIN HYDROCHLORIDE 2.5; 85 MG/1; MG/1
1 TABLET, FILM COATED ORAL
Qty: 0 | Refills: 0 | DISCHARGE

## 2019-10-09 RX ORDER — ASPIRIN/CALCIUM CARB/MAGNESIUM 324 MG
1 TABLET ORAL
Qty: 0 | Refills: 0 | DISCHARGE

## 2019-10-09 RX ORDER — CLOPIDOGREL BISULFATE 75 MG/1
1 TABLET, FILM COATED ORAL
Qty: 0 | Refills: 0 | DISCHARGE

## 2019-10-09 RX ORDER — CLOPIDOGREL BISULFATE 75 MG/1
1 TABLET, FILM COATED ORAL
Qty: 30 | Refills: 11
Start: 2019-10-09 | End: 2020-10-02

## 2019-10-09 RX ORDER — ASPIRIN/CALCIUM CARB/MAGNESIUM 324 MG
1 TABLET ORAL
Qty: 30 | Refills: 11
Start: 2019-10-09 | End: 2020-10-02

## 2019-10-09 RX ADMIN — CILOSTAZOL 100 MILLIGRAM(S): 100 TABLET ORAL at 06:17

## 2019-10-09 RX ADMIN — CLOPIDOGREL BISULFATE 75 MILLIGRAM(S): 75 TABLET, FILM COATED ORAL at 11:51

## 2019-10-09 RX ADMIN — PANTOPRAZOLE SODIUM 40 MILLIGRAM(S): 20 TABLET, DELAYED RELEASE ORAL at 07:03

## 2019-10-09 RX ADMIN — ATENOLOL 25 MILLIGRAM(S): 25 TABLET ORAL at 06:17

## 2019-10-09 RX ADMIN — MAGNESIUM OXIDE 400 MG ORAL TABLET 400 MILLIGRAM(S): 241.3 TABLET ORAL at 11:51

## 2019-10-09 RX ADMIN — LORATADINE 10 MILLIGRAM(S): 10 TABLET ORAL at 11:51

## 2019-10-09 RX ADMIN — Medication 81 MILLIGRAM(S): at 11:52

## 2019-10-09 NOTE — PROGRESS NOTE ADULT - SUBJECTIVE AND OBJECTIVE BOX
INTERVENTIONAL CARDIOLOGY FOLLOW UP NOTE    -Patient seen and examined this am  -No events overnight  -No complaints this am    T(C): 36.2 (10-09-19 @ 05:10), Max: 36.2 (10-09-19 @ 05:10)  HR: 65 (10-09-19 @ 06:18) (58 - 68)  BP: 126/71 (10-09-19 @ 06:18) (105/56 - 138/73)  RR: 16 (10-09-19 @ 06:18) (16 - 18)  SpO2: 94% (10-09-19 @ 06:18) (94% - 96%)    VASCULAR ACCESS EXAM:     RADIAL:   2+ right radial pulse   Normal capillary refill. No evidence of motor or sensory deficits of digits, hand, wrist or forearm.   -Access site clean, non-tender, without ecchymosis or hematoma.    A/P  S/p PCI via radial approach with no evidence of vascular complications post procedure.     -continue with current medications including dual antiplatelet therapy   -discharge instructions as per protocol   -outpatient follow up with primary cardiologist

## 2019-10-09 NOTE — DISCHARGE NOTE NURSING/CASE MANAGEMENT/SOCIAL WORK - PATIENT PORTAL LINK FT
You can access the FollowMyHealth Patient Portal offered by [facility] by registering at the following website: [email or web address]. By joining [employer]’s FollowMyHealth portal, you will also be able to view your health information using other applications (apps) compatible with our system.

## 2019-10-09 NOTE — DISCHARGE NOTE PROVIDER - CARE PROVIDER_API CALL
Gabriel Marie)  Cardiovascular Disease; Internal Medicine  [address] [place] [address]  Phone: [phone]  Fax: [phone]  Follow Up Time: 2 weeks

## 2019-10-09 NOTE — DISCHARGE NOTE PROVIDER - INSTRUCTIONS
Please contineu to Please continue to follow a heart healthy diet, low in cholesterol, fats and sodium.

## 2019-10-09 NOTE — DISCHARGE NOTE PROVIDER - HOSPITAL COURSE
79 year old Japanese Speaking male, former smoker, with PMHx of HTN, HLD, type 2 DM, PAD (s/p BL SFA stents; peripheral angio 9/5/18 @ [facility]  atherectomy/drug-coated balloon LSFA and PTA of left popliteal artery; most recent PTCA 07/2019 at OSH per MD notes) and CAD s/p MI with multiple PCIs (most recent LHC @ [facility] 07/2018- PTCA of OM2 ISR) who presented to Dr. Marie c/o worsening exertional chest pain/SOB, described as substernal, relieved with rest and NG, lasting seconds, occurring several times per week. Patient denies palpitations, diaphoresis, N/V, PND/orthopnea, LE edema, dizziness, or syncope. Patient subsequently underwent NST 09/17/19 revealing small, mild, reversible defect in inferolateral wall and TTE 09/17/19 revealing LVEF 70%, no WMA, mild diastolic dysfunction, mild MR.     Pt now s/p cardiac catheterization receiving PTCA/KELLY to OM2, EF 65%, R radial access, with plan for pt to return in 2 weeks for brachytherapy. Pt seen and examined at bedside this AM without any complaints. VSS, access site stable with no hematoma, non tender, labs and telemetry reviewed and pt stable for discharge as discussed with Dr. Auguste. Pt has received appropriate discharge instructions, including medication regimen, access site management and follow up with Dr. Marie in 1-2 weeks. 79 year old Czech Speaking male, former smoker, with PMHx of HTN, HLD, type 2 DM, PAD (s/p BL SFA stents; peripheral angio 9/5/18 @ [facility]  atherectomy/drug-coated balloon LSFA and PTA of left popliteal artery; most recent PTCA 07/2019 at OSH per MD notes) and CAD s/p MI with multiple PCIs (most recent LHC @ [facility] 07/2018- PTCA of OM2 ISR) who presented to Dr. Marie c/o worsening exertional chest pain/SOB, described as substernal, relieved with rest and NG, lasting seconds, occurring several times per week. Patient denies palpitations, diaphoresis, N/V, PND/orthopnea, LE edema, dizziness, or syncope. Patient subsequently underwent NST 09/17/19 revealing small, mild, reversible defect in inferolateral wall and TTE 09/17/19 revealing LVEF 70%, no WMA, mild diastolic dysfunction, mild MR.     Pt now s/p cardiac catheterization receiving PTCA/KELLY to OM2, EF 65%, R radial access, with plan for pt to return in 2 weeks for brachytherapy. A1C resulted 8.1 and pt to follow up with an endorcrinologist as an outpt. Pt seen and examined at bedside this AM without any complaints. VSS, access site stable with no hematoma, non tender, labs and telemetry reviewed and pt stable for discharge as discussed with Dr. Auguste. Pt has received appropriate discharge instructions, including medication regimen, access site management and follow up with Dr. Marie in 1-2 weeks.

## 2019-10-09 NOTE — DISCHARGE NOTE PROVIDER - NSDCCPCAREPLAN_GEN_ALL_CORE_FT
PRINCIPAL DISCHARGE DIAGNOSIS  Diagnosis: CAD (coronary artery disease)  Assessment and Plan of Treatment: You were found to have blockages in the arteries of the heart, also known as Coronary Artery Disease. You underwent a cardiac angiogram and received a stent to the Obtuse marginal artery. PLEASE CONTINUE ASPIRIN 81MG DAILY AND PLAVIX 75MG DAILY. DO NOT STOP THESE MEDICATIONS FOR ANY REASON AS THEY ARE KEEPING YOUR STENT OPEN AND PREVENTING A HEART ATTACK. Avoid strenuous activity or heavy lifting for the next five days. Do not take a bath or swim for the next five days; you may shower. For any bleeding or hematoma formation (hardened blood collection under the skin) at the access site of your right wrist please hold pressure and go to the emergency room. Please follow up with Dr. Marie in 1-2 weeks. For recurrent chest pain, please call your doctor or go to the emergency room.      SECONDARY DISCHARGE DIAGNOSES  Diagnosis: HTN (hypertension)  Assessment and Plan of Treatment: Please continue your  medications as listed to keep your blood pressure controlled. For blood pressure that is too high or too low please see your doctor or go to the emergency room as necessary.    Diagnosis: HLD (hyperlipidemia)  Assessment and Plan of Treatment: Please continue Atorvastatin to keep your cholesterol low. High cholesterol contributes to heart disease.    Diagnosis: DM (diabetes mellitus)  Assessment and Plan of Treatment: Please HOLD YOUR JENTADUETO AND RESTART ON TMRW 10/10/19 and continue all your other medications as listed for diabetes. Maintain a low carbohydrate, low sugar diet. Check for your blood sugars regularly. PRINCIPAL DISCHARGE DIAGNOSIS  Diagnosis: CAD (coronary artery disease)  Assessment and Plan of Treatment: You were found to have blockages in the arteries of the heart, also known as Coronary Artery Disease. You underwent a cardiac angiogram and received a stent to the Obtuse marginal artery. PLEASE CONTINUE ASPIRIN 81MG DAILY AND PLAVIX 75MG DAILY. DO NOT STOP THESE MEDICATIONS FOR ANY REASON AS THEY ARE KEEPING YOUR STENT OPEN AND PREVENTING A HEART ATTACK. Avoid strenuous activity or heavy lifting for the next five days. Do not take a bath or swim for the next five days; you may shower. For any bleeding or hematoma formation (hardened blood collection under the skin) at the access site of your right wrist please hold pressure and go to the emergency room. Please follow up with Dr. Marie in 1-2 weeks. For recurrent chest pain, please call your doctor or go to the emergency room.      SECONDARY DISCHARGE DIAGNOSES  Diagnosis: HTN (hypertension)  Assessment and Plan of Treatment: Please continue your  medications as listed to keep your blood pressure controlled. For blood pressure that is too high or too low please see your doctor or go to the emergency room as necessary.    Diagnosis: HLD (hyperlipidemia)  Assessment and Plan of Treatment: Please continue Atorvastatin to keep your cholesterol low. High cholesterol contributes to heart disease.    Diagnosis: DM (diabetes mellitus)  Assessment and Plan of Treatment: Please HOLD YOUR JENTADUETO AND RESTART ON TMRW 10/10/19 and continue all your other medications as listed for diabetes. Maintain a low carbohydrate, low sugar diet. Check for your blood sugars regularly. YOUR A1C WAS 8.1 WHILE  YOU WERE IN THE HOSPITAL, PLEASE FOLLOW UP WITH AN ENDOCRINOLOGIST FOR BETTER COTROL OF YOUR DIABETES

## 2019-10-17 DIAGNOSIS — I25.10 ATHEROSCLEROTIC HEART DISEASE OF NATIVE CORONARY ARTERY WITHOUT ANGINA PECTORIS: ICD-10-CM
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
## 2019-10-17 DIAGNOSIS — Z88.1 ALLERGY STATUS TO OTHER ANTIBIOTIC AGENTS STATUS: ICD-10-CM

## 2019-10-17 DIAGNOSIS — Z87.891 PERSONAL HISTORY OF NICOTINE DEPENDENCE: ICD-10-CM

## 2019-10-17 DIAGNOSIS — Z79.84 LONG TERM (CURRENT) USE OF ORAL HYPOGLYCEMIC DRUGS: ICD-10-CM

## 2019-10-17 DIAGNOSIS — I25.119 ATHEROSCLEROTIC HEART DISEASE OF NATIVE CORONARY ARTERY WITH UNSPECIFIED ANGINA PECTORIS: ICD-10-CM

## 2019-10-17 DIAGNOSIS — E11.51 TYPE 2 DIABETES MELLITUS WITH DIABETIC PERIPHERAL ANGIOPATHY WITHOUT GANGRENE: ICD-10-CM

## 2019-10-17 DIAGNOSIS — I10 ESSENTIAL (PRIMARY) HYPERTENSION: ICD-10-CM

## 2019-10-17 DIAGNOSIS — T82.855A STENOSIS OF CORONARY ARTERY STENT, INITIAL ENCOUNTER: ICD-10-CM

## 2019-10-17 DIAGNOSIS — Z79.02 LONG TERM (CURRENT) USE OF ANTITHROMBOTICS/ANTIPLATELETS: ICD-10-CM

## 2019-10-17 DIAGNOSIS — E78.5 HYPERLIPIDEMIA, UNSPECIFIED: ICD-10-CM

## 2019-10-17 DIAGNOSIS — R00.1 BRADYCARDIA, UNSPECIFIED: ICD-10-CM
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
## 2019-10-17 DIAGNOSIS — I25.2 OLD MYOCARDIAL INFARCTION: ICD-10-CM

## 2019-10-17 DIAGNOSIS — Z95.9 PRESENCE OF CARDIAC AND VASCULAR IMPLANT AND GRAFT, UNSPECIFIED: ICD-10-CM

## 2019-10-23 ENCOUNTER — APPOINTMENT (OUTPATIENT)
Dept: CARDIOLOGY | Facility: CLINIC | Age: 79
End: 2019-10-23
Payer: MEDICARE

## 2019-10-23 VITALS
BODY MASS INDEX: 28.25 KG/M2 | DIASTOLIC BLOOD PRESSURE: 54 MMHG | HEIGHT: 67 IN | SYSTOLIC BLOOD PRESSURE: 99 MMHG | WEIGHT: 180 LBS | HEART RATE: 78 BPM | RESPIRATION RATE: 12 BRPM

## 2019-10-23 PROCEDURE — 99495 TRANSJ CARE MGMT MOD F2F 14D: CPT

## 2019-10-23 NOTE — PHYSICAL EXAM
[Normal Appearance] : normal appearance [General Appearance - Well Developed] : well developed [General Appearance - Well Nourished] : well nourished [Well Groomed] : well groomed [General Appearance - In No Acute Distress] : no acute distress [No Deformities] : no deformities [Normal Conjunctiva] : the conjunctiva exhibited no abnormalities [Normal Oral Mucosa] : normal oral mucosa [Eyelids - No Xanthelasma] : the eyelids demonstrated no xanthelasmas [No Oral Cyanosis] : no oral cyanosis [No Oral Pallor] : no oral pallor [Normal Jugular Venous V Waves Present] : normal jugular venous V waves present [Normal Jugular Venous A Waves Present] : normal jugular venous A waves present [No Jugular Venous Weiner A Waves] : no jugular venous weiner A waves [Respiration, Rhythm And Depth] : normal respiratory rhythm and effort [Exaggerated Use Of Accessory Muscles For Inspiration] : no accessory muscle use [Auscultation Breath Sounds / Voice Sounds] : lungs were clear to auscultation bilaterally [Heart Sounds] : normal S1 and S2 [Heart Rate And Rhythm] : heart rate and rhythm were normal [Abdomen Soft] : soft [Murmurs] : no murmurs present [Abdomen Tenderness] : non-tender [Abdomen Mass (___ Cm)] : no abdominal mass palpated [Nail Clubbing] : no clubbing of the fingernails [Cyanosis, Localized] : no localized cyanosis [Petechial Hemorrhages (___cm)] : no petechial hemorrhages [Skin Color & Pigmentation] : normal skin color and pigmentation [] : no rash [Skin Lesions] : no skin lesions [No Venous Stasis] : no venous stasis [No Xanthoma] : no  xanthoma was observed [No Skin Ulcers] : no skin ulcer [Oriented To Time, Place, And Person] : oriented to person, place, and time [Affect] : the affect was normal [No Anxiety] : not feeling anxious [Mood] : the mood was normal

## 2019-10-23 NOTE — HISTORY OF PRESENT ILLNESS
[FreeTextEntry1] : 1. HTN: on medications, controlled. \par 2. Hyperlipidemia: on statin. 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 Due to worsening CP and abnormal NST, he underwent repeat cardiac catheterization which showed LCx ISR. He underwent PCI and CP has improved but will require brachytherapy.\par 4. PVD: patient has deep fem disease on the R on US in 2015. He had PTCA in 7/2019 at a hospital in [place]. \par Claudication has improved since his last PCI.\par

## 2019-11-06 ENCOUNTER — APPOINTMENT (OUTPATIENT)
Dept: CARDIOLOGY | Facility: CLINIC | Age: 79
End: 2019-11-06
Payer: MEDICARE

## 2019-11-06 VITALS
HEART RATE: 67 BPM | WEIGHT: 181 LBS | RESPIRATION RATE: 100 BRPM | SYSTOLIC BLOOD PRESSURE: 109 MMHG | BODY MASS INDEX: 28.41 KG/M2 | DIASTOLIC BLOOD PRESSURE: 66 MMHG | HEIGHT: 67 IN

## 2019-11-06 PROCEDURE — 93000 ELECTROCARDIOGRAM COMPLETE: CPT

## 2019-11-06 PROCEDURE — 99214 OFFICE O/P EST MOD 30 MIN: CPT

## 2019-11-06 NOTE — HISTORY OF PRESENT ILLNESS
[FreeTextEntry1] : 1. HTN: on medications, controlled. No SE noted. \par 2. Hyperlipidemia: on statin. 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 Due to worsening CP and abnormal NST, he underwent repeat cardiac catheterization which showed LCx ISR. He underwent PCI and CP has improved but will require brachytherapy. He reports compliance with medications.\par 4. PVD: patient has deep fem disease on the R on US in 2015. He had PTCA in 7/2019 at a hospital in [place]. \par Claudication has improved since his last PCI in [place]. ET is about 5 blocks. \par

## 2019-11-06 NOTE — DISCUSSION/SUMMARY
[FreeTextEntry1] : 79 M HTN hyperlipidemia DM CAD PVD s/p PCI for brachytherapy.\par Continue ASA and plavix.\par Patient is scheduled for Nov 14th.\par Continue meds for HTN.\par Continue statin.

## 2019-11-12 VITALS
OXYGEN SATURATION: 98 % | SYSTOLIC BLOOD PRESSURE: 115 MMHG | HEART RATE: 55 BPM | TEMPERATURE: 98 F | DIASTOLIC BLOOD PRESSURE: 67 MMHG | RESPIRATION RATE: 16 BRPM

## 2019-11-12 PROCEDURE — 36415 COLL VENOUS BLD VENIPUNCTURE: CPT

## 2019-11-12 PROCEDURE — 82962 GLUCOSE BLOOD TEST: CPT

## 2019-11-12 PROCEDURE — 83036 HEMOGLOBIN GLYCOSYLATED A1C: CPT

## 2019-11-12 PROCEDURE — C1887: CPT

## 2019-11-12 PROCEDURE — C1753: CPT

## 2019-11-12 PROCEDURE — 93005 ELECTROCARDIOGRAM TRACING: CPT

## 2019-11-12 PROCEDURE — C1725: CPT

## 2019-11-12 PROCEDURE — 85025 COMPLETE CBC W/AUTO DIFF WBC: CPT

## 2019-11-12 PROCEDURE — 82550 ASSAY OF CK (CPK): CPT

## 2019-11-12 PROCEDURE — 85610 PROTHROMBIN TIME: CPT

## 2019-11-12 PROCEDURE — 80053 COMPREHEN METABOLIC PANEL: CPT

## 2019-11-12 PROCEDURE — 82248 BILIRUBIN DIRECT: CPT

## 2019-11-12 PROCEDURE — 85730 THROMBOPLASTIN TIME PARTIAL: CPT

## 2019-11-12 PROCEDURE — C1894: CPT

## 2019-11-12 PROCEDURE — 82553 CREATINE MB FRACTION: CPT

## 2019-11-12 PROCEDURE — 80048 BASIC METABOLIC PNL TOTAL CA: CPT

## 2019-11-12 PROCEDURE — 85027 COMPLETE CBC AUTOMATED: CPT

## 2019-11-12 PROCEDURE — 83735 ASSAY OF MAGNESIUM: CPT

## 2019-11-12 PROCEDURE — C1769: CPT

## 2019-11-12 PROCEDURE — 86140 C-REACTIVE PROTEIN: CPT

## 2019-11-12 PROCEDURE — 80061 LIPID PANEL: CPT

## 2019-11-12 RX ORDER — CHLORHEXIDINE GLUCONATE 213 G/1000ML
1 SOLUTION TOPICAL ONCE
Refills: 0 | Status: DISCONTINUED | OUTPATIENT
Start: 2019-11-14 | End: 2019-11-15

## 2019-11-12 NOTE — H&P ADULT - NSICDXPASTSURGICALHX_GEN_ALL_CORE_FT
PAST SURGICAL HISTORY:  H/O coronary angioplasty     History of appendectomy     History of vocal cord polypectomy     S/P peripheral artery angioplasty

## 2019-11-12 NOTE — H&P ADULT - NSHPSOCIALHISTORY_GEN_ALL_CORE
Former 1.5PPD smoker (quit approx 20 years ago), rare ETOH use, no illicit drug use Former (quit 2000, prior 1.5PPD), rare ETOH use, and denies illicit drug use.

## 2019-11-12 NOTE — H&P ADULT - ASSESSMENT
78 y/o, Belarusian Speaking Male, former Smoker, with a PMHx of HTN, HLD, DM-II, PAD (s/p BL SFA Stents, Peripheral Catheterization 9/5/2018 @[facility]: atherectomy/drug-coated balloon LSFA and PTA of left popliteal artery, most recent PTCA 07/2019 at OSH per MD notes, and CAD s/p remote MI with multiple PCIs most recently Cardiac Catheterization @[facility] 10/8/2019: KELLY/PTCA OM2 ISR, PTCA OM2 ISR, LM Normal, p/mLAD: prior Stents patent, OM1: prior Stent patent, LPL: mild luminal, RCA: small and non dominant, EF: 60% who presents for Brachytherapy of OM2 ISR.     H/H 13/39.6, patient denies bleeding, GI bleeding, hematemesis, hematuria, BRBPR, and melena. Patient reports compliance with home Plavix 75mg daily and Aspirin 81mg daily with last dose this morning.    Cr. 0.96, euvolemic, IV hydration 0.9% NS @ 75cc/hr ordered pre-Cath.    ASA Class III    Consent obtained via [place] Travis  #[id number]   Risks & benefits of procedure and alternative therapy have been explained to the patient including but not limited to: allergic reaction, bleeding with possible need for blood transfusion, infection, renal and vascular compromise, limb damage, arrhythmia, stroke, vessel dissection/perforation, Myocardial Infarction, emergent CABG. Informed consent obtained and in chart.    Patient a candidate for sedation: Yes    Sedation Type: Moderate

## 2019-11-12 NOTE — H&P ADULT - HISTORY OF PRESENT ILLNESS
SKELETON   79 year old Tajik Speaking male, former smoker, with PMHx of HTN, HLD, type 2 DM, PAD (s/p BL SFA stents; peripheral angio 9/5/18 @ [facility]  atherectomy/drug-coated balloon LSFA and PTA of left popliteal artery; most recent PTCA 07/2019 at OSH per MD notes) and CAD s/p MI with multiple PCIs (most recent LHC @ [facility] 10/8/19 with PTCA/KELLY to OM2- PTCA of OM2 ISR) who originally presented to Dr. Marie c/o worsening exertional chest pain/SOB, described as substernal, relieved with rest and NG, lasting seconds, occurring several times per week.   Since last procedure pt reports feeling…  Patient denies palpitations, diaphoresis, N/V, PND/orthopnea, LE edema, dizziness, or syncope.    In light of risk factors, CCS angina class ____ symptoms, known CAD pt is recommended for brachytherapy of OM2 ISR.     cardiac catheterization @ [facility] 10/8/19 PTCA/KELLY to OM2; LM Normal, p/mLAD stents patent, OM1 stent patent, OM2 90% ISR, LPL mild luminal, RCA small and non dominant, EF 60%, EDP 5, no AS/MR. Recommended to return for brachytherapy of OM2 ISR. Confirm medications  79 year old Spanish Speaking male, former smoker, with PMHx of HTN, HLD, type 2 DM, PAD (s/p BL SFA stents; peripheral angio 9/5/18 @ [facility]  atherectomy/drug-coated balloon LSFA and PTA of left popliteal artery; most recent PTCA 07/2019 at OSH per MD notes) and CAD s/p MI with multiple PCIs (most recent LHC @ [facility] 10/8/19 with PTCA/KELLY to OM2- PTCA of OM2 ISR) who originally presented to Dr. Marie c/o worsening exertional chest pain/SOB, described as substernal, relieved with rest and NG, lasting seconds, occurring several times per week. Since last procedure pt reports feeling intermittent chest pain, independent of activity and dizziness with position changes. Pt endorses b/l claudication R>L with exertion of 3-4 blocks worsening over the last few years. Patient denies SOB, palpitations, diaphoresis, N/V, PND/orthopnea, LE edema, or syncope.    In light of risk factors, CCS angina class 4 symptoms, known CAD pt is recommended for brachytherapy of OM2 ISR.     cardiac catheterization @ [facility] 10/8/19 PTCA/KELLY to OM2; LM Normal, p/mLAD stents patent, OM1 stent patent, OM2 90% ISR, LPL mild luminal, RCA small and non dominant, EF 60%, EDP 5, no AS/MR. Recommended to return for brachytherapy of OM2 ISR. 80 y/o, Macedonian Speaking Male, former Smoker, with a PMHx of HTN, HLD, DM-II, PAD (s/p BL SFA Stents, Peripheral Catheterization 9/5/2018 @[facility]: atherectomy/drug-coated balloon LSFA and PTA of left popliteal artery, most recent PTCA 07/2019 at OSH per MD notes, and CAD s/p remote MI with multiple PCIs most recently Cardiac Catheterization @[facility] 10/8/2019: KELLY/PTCA OM2 ISR, PTCA OM2 ISR, LM Normal, p/mLAD: prior Stents patent, OM1: prior Stent patent, LPL: mild luminal, RCA: small and non dominant, EF: 60%, EDP: 5, no AS/MR who originally presented to Dr. Marie with c/o worsening exertional chest pain and shortness of breath, described as substernal, relieved with rest and NG, lasting seconds, occurring several times per week. Since his last procedure pt reports feeling intermittent chest pain, independent of activity and dizziness with position changes. Patient also endorses bilateral claudication R>L with exertion of 3-4 blocks worsening over the last few years. Patient denies SOB, palpitations, diaphoresis, N/V, PND/orthopnea, LE edema, or syncope. In light of risk factors, CCS angina class 4 symptoms, known CAD pt is recommended for brachytherapy of OM2 ISR. 78 y/o, Luxembourgish Speaking Male, former Smoker, with a PMHx of HTN, HLD, DM-II, PAD (s/p BL SFA Stents, Peripheral Catheterization 9/5/2018 @[facility]: atherectomy/drug-coated balloon LSFA and PTA of left popliteal artery, most recent PTCA 07/2019 at OSH per MD notes, and CAD s/p remote MI with multiple PCIs most recently Cardiac Catheterization @[facility] 10/8/2019: KELLY/PTCA OM2 ISR, PTCA OM2 ISR, LM Normal, p/mLAD: prior Stents patent, OM1: prior Stent patent, LPL: mild luminal, RCA: small and non dominant, EF: 60%, EDP: 5, no AS/MR who originally presented to Dr. Marie with c/o worsening exertional chest pain and shortness of breath, described as substernal, relieved with rest and NG, lasting seconds, occurring several times per week. Since his last procedure pt reports feeling intermittent chest pain, independent of activity and dizziness with position changes. Patient also endorses bilateral claudication R>L with exertion of 3-4 blocks worsening over the last few years. Patient denies SOB, palpitations, diaphoresis, N/V, PND/orthopnea, LE edema, or syncope. In light of risk factors, CCS angina class 4 symptoms, known CAD patient now presents for Brachytherapy of OM2 ISR.

## 2019-11-12 NOTE — H&P ADULT - NEGATIVE CARDIOVASCULAR SYMPTOMS
no peripheral edema/no orthopnea/no palpitations/no paroxysmal nocturnal dyspnea/no dyspnea on exertion

## 2019-11-12 NOTE — H&P ADULT - NSICDXPASTMEDICALHX_GEN_ALL_CORE_FT
PAST MEDICAL HISTORY:  Coronary artery disease s/p MI and multiple PCI    HLD (hyperlipidemia)     HTN (hypertension)     PVD (peripheral vascular disease)     Type 2 diabetes mellitus

## 2019-11-12 NOTE — H&P ADULT - NSHPLABSRESULTS_GEN_ALL_CORE
13.0   7.32  )-----------( 131      ( 14 Nov 2019 11:10 )             39.6     141  |  103  |  14  ----------------------------<  110<H>  3.9   |  27  |  0.96    Ca    9.4      14 Nov 2019 11:10    TPro  7.6  /  Alb  4.9  /  TBili  0.5  /  DBili  x   /  AST  23  /  ALT  27  /  AlkPhos  58  11-14    PT/INR - ( 14 Nov 2019 11:10 )   PT: 11.9 sec;   INR: 1.05       PTT - ( 14 Nov 2019 11:10 )  PTT:32.5 sec    CARDIAC MARKERS ( 14 Nov 2019 11:10 )  x     / x     / 109 U/L / x     / 1.5 ng/mL    Hemoglobin A1C, Whole Blood: 6.8 % (11-14-19 @ 11:10)    EKG: Sinus Bradycardia at 56bpm, no T wave inversions or ST elevations noted.

## 2019-11-14 ENCOUNTER — TRANSCRIPTION ENCOUNTER (OUTPATIENT)
Age: 79
End: 2019-11-14

## 2019-11-14 ENCOUNTER — INPATIENT (INPATIENT)
Facility: HOSPITAL | Age: 79
LOS: 0 days | Discharge: HOME | DRG: 251 | End: 2019-11-15
Attending: INTERNAL MEDICINE | Admitting: INTERNAL MEDICINE
Payer: MEDICARE

## 2019-11-14 DIAGNOSIS — Z98.890 OTHER SPECIFIED POSTPROCEDURAL STATES: Chronic | ICD-10-CM

## 2019-11-14 DIAGNOSIS — Z98.89 OTHER SPECIFIED POSTPROCEDURAL STATES: Chronic | ICD-10-CM

## 2019-11-14 DIAGNOSIS — Z98.61 CORONARY ANGIOPLASTY STATUS: Chronic | ICD-10-CM

## 2019-11-14 LAB
ALBUMIN SERPL ELPH-MCNC: 4.9 G/DL — SIGNIFICANT CHANGE UP (ref 3.3–5)
ALP SERPL-CCNC: 58 U/L — SIGNIFICANT CHANGE UP (ref 40–120)
ALT FLD-CCNC: 27 U/L — SIGNIFICANT CHANGE UP (ref 10–45)
ANION GAP SERPL CALC-SCNC: 11 MMOL/L — SIGNIFICANT CHANGE UP (ref 5–17)
APTT BLD: 32.5 SEC — SIGNIFICANT CHANGE UP (ref 27.5–36.3)
AST SERPL-CCNC: 23 U/L — SIGNIFICANT CHANGE UP (ref 10–40)
BASOPHILS # BLD AUTO: 0.06 K/UL — SIGNIFICANT CHANGE UP (ref 0–0.2)
BASOPHILS NFR BLD AUTO: 0.8 % — SIGNIFICANT CHANGE UP (ref 0–2)
BILIRUB SERPL-MCNC: 0.5 MG/DL — SIGNIFICANT CHANGE UP (ref 0.2–1.2)
BUN SERPL-MCNC: 14 MG/DL — SIGNIFICANT CHANGE UP (ref 7–23)
CALCIUM SERPL-MCNC: 9.4 MG/DL — SIGNIFICANT CHANGE UP (ref 8.4–10.5)
CHLORIDE SERPL-SCNC: 103 MMOL/L — SIGNIFICANT CHANGE UP (ref 96–108)
CHOLEST SERPL-MCNC: 125 MG/DL — SIGNIFICANT CHANGE UP (ref 10–199)
CK MB CFR SERPL CALC: 1.5 NG/ML — SIGNIFICANT CHANGE UP (ref 0–6.7)
CK SERPL-CCNC: 109 U/L — SIGNIFICANT CHANGE UP (ref 30–200)
CO2 SERPL-SCNC: 27 MMOL/L — SIGNIFICANT CHANGE UP (ref 22–31)
CREAT SERPL-MCNC: 0.96 MG/DL — SIGNIFICANT CHANGE UP (ref 0.5–1.3)
EOSINOPHIL # BLD AUTO: 0.08 K/UL — SIGNIFICANT CHANGE UP (ref 0–0.5)
EOSINOPHIL NFR BLD AUTO: 1.1 % — SIGNIFICANT CHANGE UP (ref 0–6)
GLUCOSE SERPL-MCNC: 110 MG/DL — HIGH (ref 70–99)
HBA1C BLD-MCNC: 6.8 % — HIGH (ref 4–5.6)
HCT VFR BLD CALC: 39.6 % — SIGNIFICANT CHANGE UP (ref 39–50)
HDLC SERPL-MCNC: 49 MG/DL — SIGNIFICANT CHANGE UP
HGB BLD-MCNC: 13 G/DL — SIGNIFICANT CHANGE UP (ref 13–17)
IMM GRANULOCYTES NFR BLD AUTO: 0.4 % — SIGNIFICANT CHANGE UP (ref 0–1.5)
INR BLD: 1.05 — SIGNIFICANT CHANGE UP (ref 0.88–1.16)
LIPID PNL WITH DIRECT LDL SERPL: 47 MG/DL — SIGNIFICANT CHANGE UP
LYMPHOCYTES # BLD AUTO: 1.49 K/UL — SIGNIFICANT CHANGE UP (ref 1–3.3)
LYMPHOCYTES # BLD AUTO: 20.4 % — SIGNIFICANT CHANGE UP (ref 13–44)
MCHC RBC-ENTMCNC: 29.5 PG — SIGNIFICANT CHANGE UP (ref 27–34)
MCHC RBC-ENTMCNC: 32.8 GM/DL — SIGNIFICANT CHANGE UP (ref 32–36)
MCV RBC AUTO: 89.8 FL — SIGNIFICANT CHANGE UP (ref 80–100)
MONOCYTES # BLD AUTO: 0.43 K/UL — SIGNIFICANT CHANGE UP (ref 0–0.9)
MONOCYTES NFR BLD AUTO: 5.9 % — SIGNIFICANT CHANGE UP (ref 2–14)
NEUTROPHILS # BLD AUTO: 5.23 K/UL — SIGNIFICANT CHANGE UP (ref 1.8–7.4)
NEUTROPHILS NFR BLD AUTO: 71.4 % — SIGNIFICANT CHANGE UP (ref 43–77)
NRBC # BLD: 0 /100 WBCS — SIGNIFICANT CHANGE UP (ref 0–0)
PLATELET # BLD AUTO: 131 K/UL — LOW (ref 150–400)
POTASSIUM SERPL-MCNC: 3.9 MMOL/L — SIGNIFICANT CHANGE UP (ref 3.5–5.3)
POTASSIUM SERPL-SCNC: 3.9 MMOL/L — SIGNIFICANT CHANGE UP (ref 3.5–5.3)
PROT SERPL-MCNC: 7.6 G/DL — SIGNIFICANT CHANGE UP (ref 6–8.3)
PROTHROM AB SERPL-ACNC: 11.9 SEC — SIGNIFICANT CHANGE UP (ref 10–12.9)
RBC # BLD: 4.41 M/UL — SIGNIFICANT CHANGE UP (ref 4.2–5.8)
RBC # FLD: 14.2 % — SIGNIFICANT CHANGE UP (ref 10.3–14.5)
SODIUM SERPL-SCNC: 141 MMOL/L — SIGNIFICANT CHANGE UP (ref 135–145)
TOTAL CHOLESTEROL/HDL RATIO MEASUREMENT: 2.6 RATIO — LOW (ref 3.4–9.6)
TRIGL SERPL-MCNC: 147 MG/DL — SIGNIFICANT CHANGE UP (ref 10–149)
WBC # BLD: 7.32 K/UL — SIGNIFICANT CHANGE UP (ref 3.8–10.5)
WBC # FLD AUTO: 7.32 K/UL — SIGNIFICANT CHANGE UP (ref 3.8–10.5)

## 2019-11-14 PROCEDURE — 92974 CATH PLACE CARDIO BRACHYTX: CPT

## 2019-11-14 PROCEDURE — 92920 PRQ TRLUML C ANGIOP 1ART&/BR: CPT | Mod: LC

## 2019-11-14 PROCEDURE — 77290 THER RAD SIMULAJ FIELD CPLX: CPT | Mod: 26

## 2019-11-14 PROCEDURE — 77263 THER RADIOLOGY TX PLNG CPLX: CPT

## 2019-11-14 PROCEDURE — 77316 BRACHYTX ISODOSE PLAN SIMPLE: CPT | Mod: 26

## 2019-11-14 PROCEDURE — 93454 CORONARY ARTERY ANGIO S&I: CPT | Mod: 26,59

## 2019-11-14 PROCEDURE — 99221 1ST HOSP IP/OBS SF/LOW 40: CPT | Mod: 25

## 2019-11-14 PROCEDURE — 93010 ELECTROCARDIOGRAM REPORT: CPT

## 2019-11-14 PROCEDURE — 77470 SPECIAL RADIATION TREATMENT: CPT | Mod: 26

## 2019-11-14 PROCEDURE — 77770 HDR RDNCL NTRSTL/ICAV BRCHTX: CPT | Mod: 26

## 2019-11-14 PROCEDURE — 92978 ENDOLUMINL IVUS OCT C 1ST: CPT | Mod: 26,LC

## 2019-11-14 RX ORDER — SODIUM CHLORIDE 9 MG/ML
500 INJECTION INTRAMUSCULAR; INTRAVENOUS; SUBCUTANEOUS
Refills: 0 | Status: DISCONTINUED | OUTPATIENT
Start: 2019-11-14 | End: 2019-11-15

## 2019-11-14 RX ORDER — DEXTROSE 50 % IN WATER 50 %
25 SYRINGE (ML) INTRAVENOUS ONCE
Refills: 0 | Status: DISCONTINUED | OUTPATIENT
Start: 2019-11-14 | End: 2019-11-15

## 2019-11-14 RX ORDER — ASPIRIN/CALCIUM CARB/MAGNESIUM 324 MG
81 TABLET ORAL DAILY
Refills: 0 | Status: DISCONTINUED | OUTPATIENT
Start: 2019-11-15 | End: 2019-11-15

## 2019-11-14 RX ORDER — ATENOLOL 25 MG/1
25 TABLET ORAL DAILY
Refills: 0 | Status: DISCONTINUED | OUTPATIENT
Start: 2019-11-15 | End: 2019-11-15

## 2019-11-14 RX ORDER — INSULIN LISPRO 100/ML
VIAL (ML) SUBCUTANEOUS
Refills: 0 | Status: DISCONTINUED | OUTPATIENT
Start: 2019-11-14 | End: 2019-11-15

## 2019-11-14 RX ORDER — DEXTROSE 50 % IN WATER 50 %
15 SYRINGE (ML) INTRAVENOUS ONCE
Refills: 0 | Status: DISCONTINUED | OUTPATIENT
Start: 2019-11-14 | End: 2019-11-15

## 2019-11-14 RX ORDER — INFLUENZA VIRUS VACCINE 15; 15; 15; 15 UG/.5ML; UG/.5ML; UG/.5ML; UG/.5ML
0.5 SUSPENSION INTRAMUSCULAR ONCE
Refills: 0 | Status: DISCONTINUED | OUTPATIENT
Start: 2019-11-14 | End: 2019-11-15

## 2019-11-14 RX ORDER — PANTOPRAZOLE SODIUM 20 MG/1
40 TABLET, DELAYED RELEASE ORAL
Refills: 0 | Status: DISCONTINUED | OUTPATIENT
Start: 2019-11-14 | End: 2019-11-15

## 2019-11-14 RX ORDER — INSULIN LISPRO 100/ML
VIAL (ML) SUBCUTANEOUS ONCE
Refills: 0 | Status: COMPLETED | OUTPATIENT
Start: 2019-11-14 | End: 2019-11-14

## 2019-11-14 RX ORDER — CLOPIDOGREL BISULFATE 75 MG/1
75 TABLET, FILM COATED ORAL DAILY
Refills: 0 | Status: DISCONTINUED | OUTPATIENT
Start: 2019-11-15 | End: 2019-11-15

## 2019-11-14 RX ORDER — SODIUM CHLORIDE 9 MG/ML
1000 INJECTION INTRAMUSCULAR; INTRAVENOUS; SUBCUTANEOUS
Refills: 0 | Status: DISCONTINUED | OUTPATIENT
Start: 2019-11-14 | End: 2019-11-14

## 2019-11-14 RX ORDER — LOSARTAN POTASSIUM 100 MG/1
50 TABLET, FILM COATED ORAL DAILY
Refills: 0 | Status: DISCONTINUED | OUTPATIENT
Start: 2019-11-15 | End: 2019-11-15

## 2019-11-14 RX ORDER — NITROGLYCERIN 6.5 MG
1 CAPSULE, EXTENDED RELEASE ORAL
Qty: 0 | Refills: 0 | DISCHARGE

## 2019-11-14 RX ORDER — POTASSIUM CHLORIDE 20 MEQ
20 PACKET (EA) ORAL ONCE
Refills: 0 | Status: COMPLETED | OUTPATIENT
Start: 2019-11-14 | End: 2019-11-14

## 2019-11-14 RX ORDER — ATORVASTATIN CALCIUM 80 MG/1
40 TABLET, FILM COATED ORAL AT BEDTIME
Refills: 0 | Status: DISCONTINUED | OUTPATIENT
Start: 2019-11-14 | End: 2019-11-15

## 2019-11-14 RX ORDER — CILOSTAZOL 100 MG/1
100 TABLET ORAL
Refills: 0 | Status: DISCONTINUED | OUTPATIENT
Start: 2019-11-14 | End: 2019-11-15

## 2019-11-14 RX ORDER — GLUCAGON INJECTION, SOLUTION 0.5 MG/.1ML
1 INJECTION, SOLUTION SUBCUTANEOUS ONCE
Refills: 0 | Status: DISCONTINUED | OUTPATIENT
Start: 2019-11-14 | End: 2019-11-15

## 2019-11-14 RX ORDER — SODIUM CHLORIDE 9 MG/ML
1000 INJECTION, SOLUTION INTRAVENOUS
Refills: 0 | Status: DISCONTINUED | OUTPATIENT
Start: 2019-11-14 | End: 2019-11-15

## 2019-11-14 RX ORDER — LORATADINE 10 MG/1
10 TABLET ORAL DAILY
Refills: 0 | Status: DISCONTINUED | OUTPATIENT
Start: 2019-11-15 | End: 2019-11-15

## 2019-11-14 RX ORDER — DEXTROSE 50 % IN WATER 50 %
12.5 SYRINGE (ML) INTRAVENOUS ONCE
Refills: 0 | Status: DISCONTINUED | OUTPATIENT
Start: 2019-11-14 | End: 2019-11-15

## 2019-11-14 RX ADMIN — Medication 20 MILLIEQUIVALENT(S): at 12:04

## 2019-11-14 RX ADMIN — CILOSTAZOL 100 MILLIGRAM(S): 100 TABLET ORAL at 19:43

## 2019-11-14 RX ADMIN — ATORVASTATIN CALCIUM 40 MILLIGRAM(S): 80 TABLET, FILM COATED ORAL at 22:11

## 2019-11-14 RX ADMIN — Medication 0: at 12:05

## 2019-11-14 RX ADMIN — SODIUM CHLORIDE 75 MILLILITER(S): 9 INJECTION INTRAMUSCULAR; INTRAVENOUS; SUBCUTANEOUS at 14:10

## 2019-11-14 RX ADMIN — SODIUM CHLORIDE 75 MILLILITER(S): 9 INJECTION INTRAMUSCULAR; INTRAVENOUS; SUBCUTANEOUS at 12:05

## 2019-11-14 NOTE — DISCHARGE NOTE PROVIDER - NSDCCPTREATMENT_GEN_ALL_CORE_FT
PRINCIPAL PROCEDURE  Procedure: Percutaneous coronary intervention, primary  Findings and Treatment:

## 2019-11-14 NOTE — DISCHARGE NOTE PROVIDER - CARE PROVIDER_API CALL
Gabriel Marie)  Cardiovascular Disease; Internal Medicine  [address] [place] [address]  Phone: [phone]  Fax: [phone]  Follow Up Time: 2 weeks

## 2019-11-14 NOTE — DISCHARGE NOTE PROVIDER - HOSPITAL COURSE
80 y/o, Telugu Speaking Male, former Smoker, with a PMHx of HTN, HLD, DM-II, PAD, CAD s/p remote MI with multiple PCIs most recently @[facility] 10/8/2019  who originally presented to Dr. Marie with c/o worsening exertional chest pain and shortness of breath, described as substernal, relieved with rest and NG, lasting seconds, occurring several times per week. Since his last procedure pt reports feeling intermittent chest pain, independent of activity and dizziness with position changes. In light of risk factors, CCS angina class 4 symptoms, known CAD patient returned for Brachytherapy of OM2 ISR. Patient now s/p successful IVUS/Laser/PTCA/Brachytherapy OM2 ISR, EF:60%. Patient admitted to [facility] for monitoring post procedure.            Patients VSS, right groin soft, NT, no hematoma/bruit, distal pulses unchanged from baseline. Labs reviewed. Patient now deemed stable for discharge as per Dr. Marie and to follow-up with him in 1-2 weeks. New medications are E prescribed to pharmacy of choice. 78 y/o, Mongolian Speaking Male, former Smoker, with a PMHx of HTN, HLD, DM-II, PAD, CAD s/p remote MI with multiple PCIs most recently [facility]  who originally presented to Dr. Marie with c/o worsening exertional chest pain and shortness of breath, described as substernal, relieved with rest and NG, lasting seconds, occurring several times per week. Since his last procedure pt reports feeling intermittent chest pain, independent of activity and dizziness with position changes. In light of risk factors, CCS angina class 4 symptoms, known CAD patient returned for Brachytherapy of OM2 ISR. Patient now s/p successful IVUS/Laser/PTCA/Brachytherapy OM2 ISR, EF:60%. [facility] post procedure.        Patients VSS, right groin soft, NT, no hematoma/bruit, distal pulses unchanged from baseline. Labs reviewed. Patient now deemed stable for discharge as per Dr. Marie and to follow-up with him in 1-2 weeks. New medications are E prescribed to pharmacy of choice.

## 2019-11-14 NOTE — DISCHARGE NOTE PROVIDER - NSDCCPCAREPLAN_GEN_ALL_CORE_FT
PRINCIPAL DISCHARGE DIAGNOSIS  Diagnosis: Coronary artery disease  Assessment and Plan of Treatment: You had successful balloon angioplasty and brachytherapy to the in-stent-restenosis of your Obtuse Marginal 2 artery.  --continue Aspirin 81mg by mouth daily, Plavix 75mg by mouth daily and Lipitor 40mg by mouth daily.  --DO NOT STOP THESE MEDICATIONS UNLESS INSTRUCTED BY YOUR CARDIOLOGIST.  --Please wait 3 days before returning to ordinary activities. Do not drive for 2 days. Consult your doctor before returning to vigorous activity. The catheter from your groin was removed and you should remove the dressing in 24 hours. You may shower once the dressing is removed, but avoid baths, hot tubs, or swimming for 5 days to prevent infection.  -- If you notice bleeding from the site, hardening and pain at the site, drainage or redness from the site, coolness/paleness of the extremity, swelling, or fever, please call [phone].        SECONDARY DISCHARGE DIAGNOSES  Diagnosis: Peripheral vascular disease  Assessment and Plan of Treatment: Continue Cilostazol 100mg by mouth twice daily    Diagnosis: HTN (hypertension)  Assessment and Plan of Treatment: Continue Atenolol 25mg by mouth daily and Losartan 50mg by mouth daily    Diagnosis: Diabetes  Assessment and Plan of Treatment: Please HOLD Jentadueto for 48 hours, restart on Sunday 11/17/19. You may continue Jardiance 25mg by mouth daily PRINCIPAL DISCHARGE DIAGNOSIS  Diagnosis: Coronary artery disease  Assessment and Plan of Treatment: You had successful balloon angioplasty and brachytherapy to the in-stent-restenosis of your Obtuse Marginal 2 artery.  --continue Aspirin 81mg by mouth daily, Plavix 75mg by mouth daily and Lipitor 40mg by mouth daily.  --DO NOT STOP THESE MEDICATIONS UNLESS INSTRUCTED BY YOUR CARDIOLOGIST, AS IT CAN RESULT IN HEART ATTACK AND DEATH  --Please wait 3 days before returning to ordinary activities. Do not drive for 2 days. Consult your doctor before returning to vigorous activity. The catheter from your groin was removed. You may shower and rinse the site with soap and water, but avoid baths, hot tubs, or swimming for 5 days to prevent infection.  -- If you notice bleeding from the site, hardening and pain at the site, drainage or redness from the site, coolness/paleness of the extremity, swelling, or fever, please call [phone] or go to the emergency room.      SECONDARY DISCHARGE DIAGNOSES  Diagnosis: Diabetes  Assessment and Plan of Treatment: Please HOLD Jentadueto for 48 hours, restart on Sunday 11/17/19. You may continue Jardiance 25mg by mouth daily.    Diagnosis: HTN (hypertension)  Assessment and Plan of Treatment: Continue Atenolol 25mg by mouth daily and Losartan 50mg by mouth daily    Diagnosis: Peripheral vascular disease  Assessment and Plan of Treatment: Continue Cilostazol 100mg by mouth twice daily

## 2019-11-15 ENCOUNTER — TRANSCRIPTION ENCOUNTER (OUTPATIENT)
Age: 79
End: 2019-11-15

## 2019-11-15 VITALS
HEART RATE: 64 BPM | DIASTOLIC BLOOD PRESSURE: 61 MMHG | RESPIRATION RATE: 16 BRPM | OXYGEN SATURATION: 96 % | SYSTOLIC BLOOD PRESSURE: 120 MMHG

## 2019-11-15 LAB
ANION GAP SERPL CALC-SCNC: 12 MMOL/L — SIGNIFICANT CHANGE UP (ref 5–17)
BASOPHILS # BLD AUTO: 0.05 K/UL — SIGNIFICANT CHANGE UP (ref 0–0.2)
BASOPHILS NFR BLD AUTO: 0.8 % — SIGNIFICANT CHANGE UP (ref 0–2)
BUN SERPL-MCNC: 16 MG/DL — SIGNIFICANT CHANGE UP (ref 7–23)
CALCIUM SERPL-MCNC: 9.2 MG/DL — SIGNIFICANT CHANGE UP (ref 8.4–10.5)
CHLORIDE SERPL-SCNC: 103 MMOL/L — SIGNIFICANT CHANGE UP (ref 96–108)
CO2 SERPL-SCNC: 26 MMOL/L — SIGNIFICANT CHANGE UP (ref 22–31)
CREAT SERPL-MCNC: 0.93 MG/DL — SIGNIFICANT CHANGE UP (ref 0.5–1.3)
EOSINOPHIL # BLD AUTO: 0.05 K/UL — SIGNIFICANT CHANGE UP (ref 0–0.5)
EOSINOPHIL NFR BLD AUTO: 0.8 % — SIGNIFICANT CHANGE UP (ref 0–6)
GLUCOSE SERPL-MCNC: 113 MG/DL — HIGH (ref 70–99)
HCT VFR BLD CALC: 41.7 % — SIGNIFICANT CHANGE UP (ref 39–50)
HGB BLD-MCNC: 13.6 G/DL — SIGNIFICANT CHANGE UP (ref 13–17)
IMM GRANULOCYTES NFR BLD AUTO: 0.3 % — SIGNIFICANT CHANGE UP (ref 0–1.5)
LYMPHOCYTES # BLD AUTO: 1.43 K/UL — SIGNIFICANT CHANGE UP (ref 1–3.3)
LYMPHOCYTES # BLD AUTO: 22.1 % — SIGNIFICANT CHANGE UP (ref 13–44)
MAGNESIUM SERPL-MCNC: 2.1 MG/DL — SIGNIFICANT CHANGE UP (ref 1.6–2.6)
MCHC RBC-ENTMCNC: 29.4 PG — SIGNIFICANT CHANGE UP (ref 27–34)
MCHC RBC-ENTMCNC: 32.6 GM/DL — SIGNIFICANT CHANGE UP (ref 32–36)
MCV RBC AUTO: 90.1 FL — SIGNIFICANT CHANGE UP (ref 80–100)
MONOCYTES # BLD AUTO: 0.49 K/UL — SIGNIFICANT CHANGE UP (ref 0–0.9)
MONOCYTES NFR BLD AUTO: 7.6 % — SIGNIFICANT CHANGE UP (ref 2–14)
NEUTROPHILS # BLD AUTO: 4.44 K/UL — SIGNIFICANT CHANGE UP (ref 1.8–7.4)
NEUTROPHILS NFR BLD AUTO: 68.4 % — SIGNIFICANT CHANGE UP (ref 43–77)
NRBC # BLD: 0 /100 WBCS — SIGNIFICANT CHANGE UP (ref 0–0)
PLATELET # BLD AUTO: 124 K/UL — LOW (ref 150–400)
POTASSIUM SERPL-MCNC: 4.1 MMOL/L — SIGNIFICANT CHANGE UP (ref 3.5–5.3)
POTASSIUM SERPL-SCNC: 4.1 MMOL/L — SIGNIFICANT CHANGE UP (ref 3.5–5.3)
RBC # BLD: 4.63 M/UL — SIGNIFICANT CHANGE UP (ref 4.2–5.8)
RBC # FLD: 14 % — SIGNIFICANT CHANGE UP (ref 10.3–14.5)
SODIUM SERPL-SCNC: 141 MMOL/L — SIGNIFICANT CHANGE UP (ref 135–145)
WBC # BLD: 6.48 K/UL — SIGNIFICANT CHANGE UP (ref 3.8–10.5)
WBC # FLD AUTO: 6.48 K/UL — SIGNIFICANT CHANGE UP (ref 3.8–10.5)

## 2019-11-15 PROCEDURE — 99222 1ST HOSP IP/OBS MODERATE 55: CPT

## 2019-11-15 PROCEDURE — 93010 ELECTROCARDIOGRAM REPORT: CPT

## 2019-11-15 RX ORDER — ASPIRIN/CALCIUM CARB/MAGNESIUM 324 MG
1 TABLET ORAL
Qty: 30 | Refills: 11
Start: 2019-11-15 | End: 2020-11-08

## 2019-11-15 RX ORDER — CLOPIDOGREL BISULFATE 75 MG/1
1 TABLET, FILM COATED ORAL
Qty: 0 | Refills: 0 | DISCHARGE
Start: 2019-11-15

## 2019-11-15 RX ADMIN — CLOPIDOGREL BISULFATE 75 MILLIGRAM(S): 75 TABLET, FILM COATED ORAL at 13:45

## 2019-11-15 RX ADMIN — Medication 81 MILLIGRAM(S): at 13:45

## 2019-11-15 RX ADMIN — ATENOLOL 25 MILLIGRAM(S): 25 TABLET ORAL at 06:31

## 2019-11-15 RX ADMIN — CILOSTAZOL 100 MILLIGRAM(S): 100 TABLET ORAL at 06:31

## 2019-11-15 RX ADMIN — PANTOPRAZOLE SODIUM 40 MILLIGRAM(S): 20 TABLET, DELAYED RELEASE ORAL at 06:31

## 2019-11-15 RX ADMIN — LORATADINE 10 MILLIGRAM(S): 10 TABLET ORAL at 13:46

## 2019-11-15 NOTE — DISCHARGE NOTE NURSING/CASE MANAGEMENT/SOCIAL WORK - PATIENT PORTAL LINK FT
You can access the FollowMyHealth Patient Portal offered by [facility] by registering at the following website: [email or web address]. By joining [employer]s FollowMyHealth portal, you will also be able to view your health information using other applications (apps) compatible with our system.

## 2019-11-19 DIAGNOSIS — E78.5 HYPERLIPIDEMIA, UNSPECIFIED: ICD-10-CM

## 2019-11-19 DIAGNOSIS — Z87.891 PERSONAL HISTORY OF NICOTINE DEPENDENCE: ICD-10-CM

## 2019-11-19 DIAGNOSIS — Z79.82 LONG TERM (CURRENT) USE OF ASPIRIN: ICD-10-CM

## 2019-11-19 DIAGNOSIS — T82.855A STENOSIS OF CORONARY ARTERY STENT, INITIAL ENCOUNTER: ICD-10-CM

## 2019-11-19 DIAGNOSIS — I10 ESSENTIAL (PRIMARY) HYPERTENSION: ICD-10-CM
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
## 2019-11-19 DIAGNOSIS — I25.2 OLD MYOCARDIAL INFARCTION: ICD-10-CM

## 2019-11-19 DIAGNOSIS — Z95.5 PRESENCE OF CORONARY ANGIOPLASTY IMPLANT AND GRAFT: ICD-10-CM

## 2019-11-19 DIAGNOSIS — I25.119 ATHEROSCLEROTIC HEART DISEASE OF NATIVE CORONARY ARTERY WITH UNSPECIFIED ANGINA PECTORIS: ICD-10-CM

## 2019-11-19 DIAGNOSIS — E11.51 TYPE 2 DIABETES MELLITUS WITH DIABETIC PERIPHERAL ANGIOPATHY WITHOUT GANGRENE: ICD-10-CM

## 2019-11-19 DIAGNOSIS — Z88.1 ALLERGY STATUS TO OTHER ANTIBIOTIC AGENTS STATUS: ICD-10-CM

## 2019-11-19 DIAGNOSIS — Z95.820 PERIPHERAL VASCULAR ANGIOPLASTY STATUS WITH IMPLANTS AND GRAFTS: ICD-10-CM
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
## 2019-11-20 ENCOUNTER — APPOINTMENT (OUTPATIENT)
Dept: CARDIOLOGY | Facility: CLINIC | Age: 79
End: 2019-11-20
Payer: MEDICARE

## 2019-11-20 VITALS
HEIGHT: 67 IN | SYSTOLIC BLOOD PRESSURE: 122 MMHG | DIASTOLIC BLOOD PRESSURE: 59 MMHG | HEART RATE: 69 BPM | BODY MASS INDEX: 28.25 KG/M2 | WEIGHT: 180 LBS | RESPIRATION RATE: 12 BRPM

## 2019-11-20 PROCEDURE — 93000 ELECTROCARDIOGRAM COMPLETE: CPT

## 2019-11-20 PROCEDURE — 99214 OFFICE O/P EST MOD 30 MIN: CPT

## 2019-11-20 NOTE — PHYSICAL EXAM
[Normal Appearance] : normal appearance [General Appearance - Well Developed] : well developed [General Appearance - Well Nourished] : well nourished [Well Groomed] : well groomed [No Deformities] : no deformities [General Appearance - In No Acute Distress] : no acute distress [Normal Conjunctiva] : the conjunctiva exhibited no abnormalities [Eyelids - No Xanthelasma] : the eyelids demonstrated no xanthelasmas [Normal Oral Mucosa] : normal oral mucosa [No Oral Pallor] : no oral pallor [No Oral Cyanosis] : no oral cyanosis [Normal Jugular Venous V Waves Present] : normal jugular venous V waves present [Normal Jugular Venous A Waves Present] : normal jugular venous A waves present [No Jugular Venous Weiner A Waves] : no jugular venous weiner A waves [Exaggerated Use Of Accessory Muscles For Inspiration] : no accessory muscle use [Respiration, Rhythm And Depth] : normal respiratory rhythm and effort [Heart Rate And Rhythm] : heart rate and rhythm were normal [Auscultation Breath Sounds / Voice Sounds] : lungs were clear to auscultation bilaterally [Murmurs] : no murmurs present [Abdomen Soft] : soft [Heart Sounds] : normal S1 and S2 [Abdomen Tenderness] : non-tender [Abdomen Mass (___ Cm)] : no abdominal mass palpated [Nail Clubbing] : no clubbing of the fingernails [Cyanosis, Localized] : no localized cyanosis [Petechial Hemorrhages (___cm)] : no petechial hemorrhages [Skin Color & Pigmentation] : normal skin color and pigmentation [] : no rash [No Venous Stasis] : no venous stasis [Skin Lesions] : no skin lesions [No Xanthoma] : no  xanthoma was observed [No Skin Ulcers] : no skin ulcer [Mood] : the mood was normal [Affect] : the affect was normal [Oriented To Time, Place, And Person] : oriented to person, place, and time [No Anxiety] : not feeling anxious

## 2019-11-20 NOTE — DISCUSSION/SUMMARY
[FreeTextEntry1] : 79 M HTN DM hyperlipidemia PVD CAD s/p PCI for f/u.\par Continue ASA and plavix.\par Exercise daily.\par DM control.\par Continue meds for HTN and hyperlipidemia.

## 2019-11-20 NOTE — HISTORY OF PRESENT ILLNESS
[FreeTextEntry1] : 1. HTN: controlled.\par 2. Hyperlipidemia: on statin. 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 Due to worsening CP and abnormal NST, he underwent repeat cardiac catheterization which showed LCx ISR. He underwent PCIand then underwent brachytherapy. He is compliant with ASA and plavix. CP and SOB have improved. ET is stable. \par 4. PVD: patient has deep fem disease on the R on US in 2015. He had PTCA in 7/2019 at a hospital in [place]. \par Claudication has improved since his last PCI in [place]. ET is about 5 blocks. \par

## 2019-11-20 NOTE — REASON FOR VISIT
[Follow-Up - Clinic] : a clinic follow-up of [FreeTextEntry1] : 79 M HTN hyperlipidemia PVD CAD s/p PCI for f/u. \par [phone]\par Dr. Ward\par

## 2019-12-18 ENCOUNTER — APPOINTMENT (OUTPATIENT)
Dept: CARDIOLOGY | Facility: CLINIC | Age: 79
End: 2019-12-18
Payer: MEDICARE

## 2019-12-18 VITALS
SYSTOLIC BLOOD PRESSURE: 112 MMHG | WEIGHT: 181 LBS | RESPIRATION RATE: 16 BRPM | DIASTOLIC BLOOD PRESSURE: 70 MMHG | HEIGHT: 67 IN | HEART RATE: 78 BPM | BODY MASS INDEX: 28.41 KG/M2

## 2019-12-18 PROCEDURE — 99214 OFFICE O/P EST MOD 30 MIN: CPT

## 2019-12-18 PROCEDURE — 93000 ELECTROCARDIOGRAM COMPLETE: CPT

## 2019-12-18 NOTE — DISCUSSION/SUMMARY
[FreeTextEntry1] : 79 M HTN hyperlipidemia CAD s/p PCI PVD for f/u.\par Patient can have dental surgery but he MUST CONTINUE ASA AND PLAVIX FOR NOW.\par Continue medications for HTN an dhyperlipidemia.

## 2019-12-18 NOTE — REASON FOR VISIT
[Follow-Up - Clinic] : a clinic follow-up of [Coronary Artery Disease] : coronary artery disease [Hyperlipidemia] : hyperlipidemia [Peripheral Vascular Disease] : peripheral vascular disease [Hypertension] : hypertension [FreeTextEntry1] : 79 M HTN hyperlipidemia PVD CAD s/p PCI for f/u. \par [phone]\par Dr. Ward\par

## 2019-12-18 NOTE — HISTORY OF PRESENT ILLNESS
[FreeTextEntry1] : 1. HTN: controlled. No SE noted. \par 2. Hyperlipidemia: on statin.  No muscle pain is noted. 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 He underwent repeat cardiac catheterization which showed LCx ISR. He underwent PCI and then underwent brachytherapy in 11/2019.. He is compliant with ASA and plavix.  Patient is here for dental clearance. He denies CP or oSOB. \par 4. PVD: patient has deep fem disease on the R on US in 2015. He had PTCA in 7/2019 at a hospital in [place]. \par Claudication has improved since his last PCI in [place]. ET is about 5 blocks. \par

## 2019-12-18 NOTE — PHYSICAL EXAM
[Normal Appearance] : normal appearance [Well Groomed] : well groomed [General Appearance - Well Developed] : well developed [General Appearance - Well Nourished] : well nourished [No Deformities] : no deformities [Eyelids - No Xanthelasma] : the eyelids demonstrated no xanthelasmas [General Appearance - In No Acute Distress] : no acute distress [Normal Conjunctiva] : the conjunctiva exhibited no abnormalities [No Oral Pallor] : no oral pallor [Normal Oral Mucosa] : normal oral mucosa [No Oral Cyanosis] : no oral cyanosis [Normal Jugular Venous A Waves Present] : normal jugular venous A waves present [Normal Jugular Venous V Waves Present] : normal jugular venous V waves present [No Jugular Venous Weiner A Waves] : no jugular venous weiner A waves [Respiration, Rhythm And Depth] : normal respiratory rhythm and effort [Exaggerated Use Of Accessory Muscles For Inspiration] : no accessory muscle use [Auscultation Breath Sounds / Voice Sounds] : lungs were clear to auscultation bilaterally [Heart Rate And Rhythm] : heart rate and rhythm were normal [Heart Sounds] : normal S1 and S2 [Murmurs] : no murmurs present [Abdomen Tenderness] : non-tender [Abdomen Soft] : soft [Abdomen Mass (___ Cm)] : no abdominal mass palpated [Cyanosis, Localized] : no localized cyanosis [Petechial Hemorrhages (___cm)] : no petechial hemorrhages [Nail Clubbing] : no clubbing of the fingernails [] : no rash [Skin Color & Pigmentation] : normal skin color and pigmentation [No Venous Stasis] : no venous stasis [Skin Lesions] : no skin lesions [No Xanthoma] : no  xanthoma was observed [No Skin Ulcers] : no skin ulcer [Affect] : the affect was normal [Oriented To Time, Place, And Person] : oriented to person, place, and time [No Anxiety] : not feeling anxious [Mood] : the mood was normal

## 2019-12-23 PROCEDURE — 80048 BASIC METABOLIC PNL TOTAL CA: CPT

## 2019-12-23 PROCEDURE — 85730 THROMBOPLASTIN TIME PARTIAL: CPT

## 2019-12-23 PROCEDURE — C1885: CPT

## 2019-12-23 PROCEDURE — C1725: CPT

## 2019-12-23 PROCEDURE — 83036 HEMOGLOBIN GLYCOSYLATED A1C: CPT

## 2019-12-23 PROCEDURE — C1769: CPT

## 2019-12-23 PROCEDURE — C1887: CPT

## 2019-12-23 PROCEDURE — 83735 ASSAY OF MAGNESIUM: CPT

## 2019-12-23 PROCEDURE — 82553 CREATINE MB FRACTION: CPT

## 2019-12-23 PROCEDURE — 77770 HDR RDNCL NTRSTL/ICAV BRCHTX: CPT

## 2019-12-23 PROCEDURE — 77470 SPECIAL RADIATION TREATMENT: CPT

## 2019-12-23 PROCEDURE — 93005 ELECTROCARDIOGRAM TRACING: CPT

## 2019-12-23 PROCEDURE — C1894: CPT

## 2019-12-23 PROCEDURE — C1760: CPT

## 2019-12-23 PROCEDURE — C1717: CPT

## 2019-12-23 PROCEDURE — 77290 THER RAD SIMULAJ FIELD CPLX: CPT

## 2019-12-23 PROCEDURE — 80061 LIPID PANEL: CPT

## 2019-12-23 PROCEDURE — 85610 PROTHROMBIN TIME: CPT

## 2019-12-23 PROCEDURE — 36415 COLL VENOUS BLD VENIPUNCTURE: CPT

## 2019-12-23 PROCEDURE — 85025 COMPLETE CBC W/AUTO DIFF WBC: CPT

## 2019-12-23 PROCEDURE — 82550 ASSAY OF CK (CPK): CPT

## 2019-12-23 PROCEDURE — 80053 COMPREHEN METABOLIC PANEL: CPT

## 2019-12-23 PROCEDURE — 77316 BRACHYTX ISODOSE PLAN SIMPLE: CPT

## 2019-12-23 PROCEDURE — C1728: CPT

## 2019-12-23 PROCEDURE — C1753: CPT

## 2019-12-23 PROCEDURE — 82962 GLUCOSE BLOOD TEST: CPT

## 2020-02-12 ENCOUNTER — APPOINTMENT (OUTPATIENT)
Dept: CARDIOLOGY | Facility: CLINIC | Age: 80
End: 2020-02-12
Payer: MEDICARE

## 2020-02-12 VITALS
HEART RATE: 81 BPM | DIASTOLIC BLOOD PRESSURE: 63 MMHG | BODY MASS INDEX: 28.25 KG/M2 | RESPIRATION RATE: 12 BRPM | WEIGHT: 180 LBS | SYSTOLIC BLOOD PRESSURE: 132 MMHG | HEIGHT: 67 IN

## 2020-02-12 PROCEDURE — 99214 OFFICE O/P EST MOD 30 MIN: CPT

## 2020-02-12 PROCEDURE — 93000 ELECTROCARDIOGRAM COMPLETE: CPT

## 2020-02-12 NOTE — REASON FOR VISIT
[Follow-Up - Clinic] : a clinic follow-up of [Coronary Artery Disease] : coronary artery disease [Hypertension] : hypertension [Hyperlipidemia] : hyperlipidemia [Peripheral Vascular Disease] : peripheral vascular disease [FreeTextEntry1] : 79 M HTN hyperlipidemia PVD CAD s/p PCI for f/u. \par [phone]\par Dr. Ward\par \par

## 2020-02-12 NOTE — PHYSICAL EXAM
[General Appearance - Well Developed] : well developed [Normal Appearance] : normal appearance [General Appearance - Well Nourished] : well nourished [Well Groomed] : well groomed [No Deformities] : no deformities [General Appearance - In No Acute Distress] : no acute distress [Normal Conjunctiva] : the conjunctiva exhibited no abnormalities [Eyelids - No Xanthelasma] : the eyelids demonstrated no xanthelasmas [Normal Oral Mucosa] : normal oral mucosa [No Oral Pallor] : no oral pallor [No Oral Cyanosis] : no oral cyanosis [Normal Jugular Venous A Waves Present] : normal jugular venous A waves present [No Jugular Venous Weiner A Waves] : no jugular venous weiner A waves [Normal Jugular Venous V Waves Present] : normal jugular venous V waves present [Respiration, Rhythm And Depth] : normal respiratory rhythm and effort [Exaggerated Use Of Accessory Muscles For Inspiration] : no accessory muscle use [Auscultation Breath Sounds / Voice Sounds] : lungs were clear to auscultation bilaterally [Heart Rate And Rhythm] : heart rate and rhythm were normal [Heart Sounds] : normal S1 and S2 [Murmurs] : no murmurs present [Abdomen Soft] : soft [Abdomen Tenderness] : non-tender [Abdomen Mass (___ Cm)] : no abdominal mass palpated [Cyanosis, Localized] : no localized cyanosis [Petechial Hemorrhages (___cm)] : no petechial hemorrhages [Nail Clubbing] : no clubbing of the fingernails [Skin Color & Pigmentation] : normal skin color and pigmentation [] : no rash [No Venous Stasis] : no venous stasis [Skin Lesions] : no skin lesions [No Skin Ulcers] : no skin ulcer [Oriented To Time, Place, And Person] : oriented to person, place, and time [No Xanthoma] : no  xanthoma was observed [Mood] : the mood was normal [Affect] : the affect was normal [No Anxiety] : not feeling anxious

## 2020-02-12 NOTE — DISCUSSION/SUMMARY
[FreeTextEntry1] : 79 M HTN hyperlipidemia PVD CAD s/p PCI for f/u. \par Patient has one or two episodes of CP per week.\par Continue plavix. No need for prasugrel.\par Continue ASA.\par Continue exercise.\par Continue NG as needed.

## 2020-02-12 NOTE — HISTORY OF PRESENT ILLNESS
[FreeTextEntry1] : 1. HTN: controlled. No SE noted. Controlled. \par 2. Hyperlipidemia: on statin. 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 He underwent repeat cardiac catheterization which showed LCx ISR. He underwent PCI and then underwent brachytherapy in 11/2019. He is on ASA and plavix. He denies CP. He has mild exertional SOB. He uses NG once or twice per week.\par 4. PVD: patient has deep fem disease on the R on US in 2015. He had PTCA in 7/2019 at a hospital in [place]. \par Claudication has improved since his last PCI in [place]. Claudication is stable.

## 2020-04-15 ENCOUNTER — APPOINTMENT (OUTPATIENT)
Dept: HEART AND VASCULAR | Facility: CLINIC | Age: 80
End: 2020-04-15
Payer: MEDICARE

## 2020-04-15 PROCEDURE — 99442: CPT

## 2020-04-15 NOTE — HISTORY OF PRESENT ILLNESS
[Verbal consent obtained from patient] : the patient, [unfilled] [FreeTextEntry1] : 79 M  HTN hyperlipidemia CAD s/p PCI DM PVD for f/u.\par Patient is s/p PCI.\par CP improved.\par No SOB.\par NO cough or fevers noted.\par ET is stable.\par Compliant with medications. [No] : no difficulty breathing [None] : none [Stable] : stable [Patient scheduled for an office visit] : Patient scheduled for an office visit [Time Spent: ___ minutes] : I have spent [unfilled] minutes with the patient on the telephone

## 2020-05-22 ENCOUNTER — APPOINTMENT (OUTPATIENT)
Dept: HEART AND VASCULAR | Facility: CLINIC | Age: 80
End: 2020-05-22
Payer: MEDICARE

## 2020-05-22 PROCEDURE — 99442: CPT

## 2020-05-26 NOTE — HISTORY OF PRESENT ILLNESS
[Home] : at home, [unfilled] , at the time of the visit. [Medical Office: ([facility])___] : at the medical office located in  [Verbal consent obtained from patient] : the patient, [unfilled] [FreeTextEntry1] : 79 M HTN hyperlipidemia PVD CAD s/p PCI for f/u. \par [phone]\par  \par 1. HTN: controlled. No SE noted. Controlled. BP controlled. \par 2. Hyperlipidemia: on statin. Compliant with medications. No SE noted. 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 He underwent repeat cardiac catheterization which showed LCx ISR. He underwent PCI and then underwent brachytherapy in 2019. He is on ASA and plavix. He denies CP. He has mild exertional SOB. He uses NG once or twice per week.\par 4. PVD: patient has deep fem disease on the R on US in . He had PTCA in 2019 at a hospital in [place]. \par Claudication has improved since his last PCI in [place]. \par \par Patient denies CP or SOB. ET is stable. No cough or fevers noted. \par \par EK20 NSR nl axis no ST changes  \par Echo:  nl LVEF  \par Cardiac Cath:   \par  [Time Spent: ___ minutes] : I have spent [unfilled] minutes with the patient on the telephone

## 2020-05-26 NOTE — PLAN
[FreeTextEntry1] : 1. Continue medications for HTN.\par 2. Continue statin.\par 3. ASA and plavix for CAD.\par Social distancing and symptom monitoring.

## 2020-06-03 ENCOUNTER — APPOINTMENT (OUTPATIENT)
Dept: CARDIOLOGY | Facility: CLINIC | Age: 80
End: 2020-06-03
Payer: MEDICARE

## 2020-06-03 VITALS
WEIGHT: 181 LBS | HEART RATE: 75 BPM | SYSTOLIC BLOOD PRESSURE: 137 MMHG | BODY MASS INDEX: 28.41 KG/M2 | RESPIRATION RATE: 15 BRPM | DIASTOLIC BLOOD PRESSURE: 68 MMHG | HEIGHT: 67 IN

## 2020-06-03 PROCEDURE — 93000 ELECTROCARDIOGRAM COMPLETE: CPT

## 2020-06-03 PROCEDURE — 99214 OFFICE O/P EST MOD 30 MIN: CPT

## 2020-06-03 NOTE — PHYSICAL EXAM
[General Appearance - Well Developed] : well developed [Normal Appearance] : normal appearance [General Appearance - Well Nourished] : well nourished [Well Groomed] : well groomed [No Deformities] : no deformities [Normal Conjunctiva] : the conjunctiva exhibited no abnormalities [General Appearance - In No Acute Distress] : no acute distress [Eyelids - No Xanthelasma] : the eyelids demonstrated no xanthelasmas [Normal Oral Mucosa] : normal oral mucosa [No Oral Cyanosis] : no oral cyanosis [No Oral Pallor] : no oral pallor [Normal Jugular Venous A Waves Present] : normal jugular venous A waves present [Normal Jugular Venous V Waves Present] : normal jugular venous V waves present [No Jugular Venous Weiner A Waves] : no jugular venous weiner A waves [Respiration, Rhythm And Depth] : normal respiratory rhythm and effort [Exaggerated Use Of Accessory Muscles For Inspiration] : no accessory muscle use [Heart Rate And Rhythm] : heart rate and rhythm were normal [Auscultation Breath Sounds / Voice Sounds] : lungs were clear to auscultation bilaterally [Heart Sounds] : normal S1 and S2 [Murmurs] : no murmurs present [Abdomen Tenderness] : non-tender [Abdomen Soft] : soft [Nail Clubbing] : no clubbing of the fingernails [Abdomen Mass (___ Cm)] : no abdominal mass palpated [Cyanosis, Localized] : no localized cyanosis [Petechial Hemorrhages (___cm)] : no petechial hemorrhages [Skin Color & Pigmentation] : normal skin color and pigmentation [] : no rash [No Venous Stasis] : no venous stasis [Skin Lesions] : no skin lesions [No Skin Ulcers] : no skin ulcer [Oriented To Time, Place, And Person] : oriented to person, place, and time [No Xanthoma] : no  xanthoma was observed [Affect] : the affect was normal [Mood] : the mood was normal [No Anxiety] : not feeling anxious

## 2020-06-17 NOTE — DISCUSSION/SUMMARY
[FreeTextEntry1] : 80 M HTN hyperlipidemia CAD s/p PCI PVD for EGD.\par PATIENT IS AT AN INTERMEDIATE LEVEL OF CV RISK FOR LOW RISK SURGERY.\par Continue medications for HTN.\par Continue statin.\par STOP ASA AND PLAVIX 7 DAYS PRIOR TO PROCEDURE.

## 2020-06-17 NOTE — REASON FOR VISIT
[Follow-Up - Clinic] : a clinic follow-up of [Hyperlipidemia] : hyperlipidemia [Coronary Artery Disease] : coronary artery disease [Hypertension] : hypertension [Peripheral Vascular Disease] : peripheral vascular disease [FreeTextEntry1] : 79 M HTN hyperlipidemia PVD CAD s/p PCI for f/u. \par [phone]\par Dr. Ward

## 2020-07-06 ENCOUNTER — RX RENEWAL (OUTPATIENT)
Age: 80
End: 2020-07-06

## 2020-08-19 NOTE — DISCUSSION/SUMMARY
Continue hospice care and treat through [facility]  [FreeTextEntry1] : 79 M HTN DM hyperlipidemia CAD s/p PCI PVD for f/u.\par Continue meds for HTN.\par Continue statin.\par Continue ASA and plavix for CAD.\par Patient is for brachytherapy.\par Hospital and cath reports reviewed.

## 2020-08-20 ENCOUNTER — APPOINTMENT (OUTPATIENT)
Dept: CARDIOLOGY | Facility: CLINIC | Age: 80
End: 2020-08-20
Payer: MEDICARE

## 2020-08-20 ENCOUNTER — NON-APPOINTMENT (OUTPATIENT)
Age: 80
End: 2020-08-20

## 2020-08-20 VITALS
OXYGEN SATURATION: 96 % | DIASTOLIC BLOOD PRESSURE: 60 MMHG | RESPIRATION RATE: 15 BRPM | TEMPERATURE: 97.6 F | WEIGHT: 179 LBS | SYSTOLIC BLOOD PRESSURE: 106 MMHG | BODY MASS INDEX: 28.04 KG/M2 | HEART RATE: 78 BPM

## 2020-08-20 PROCEDURE — 93922 UPR/L XTREMITY ART 2 LEVELS: CPT

## 2020-08-20 PROCEDURE — 93306 TTE W/DOPPLER COMPLETE: CPT

## 2020-08-20 PROCEDURE — 93923 UPR/LXTR ART STDY 3+ LVLS: CPT

## 2020-08-20 PROCEDURE — 99214 OFFICE O/P EST MOD 30 MIN: CPT

## 2020-08-20 PROCEDURE — 93000 ELECTROCARDIOGRAM COMPLETE: CPT

## 2020-08-20 NOTE — HISTORY OF PRESENT ILLNESS
[FreeTextEntry1] :   \par 1. HTN: controlled. No SE noted. \par 2. Hyperlipidemia: on statin. No muscle pain is noted.  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 He underwent repeat cardiac catheterization which showed LCx ISR. He underwent PCI and then underwent brachytherapy in 2019. He is on ASA and plavix. Patient denies CP but has noted intermittent SOB with exertion over the last month.  \par 4. PVD: patient has deep fem disease on the R on US in . He had PTCA in 2019 at a hospital in [place]. \par Claudication has improved since his last PCI in [place].  \par He has noted worsening bilateral leg pain over the last month. \par Cardiology Summary\par \par EK-3-20 NSR nl axis no ST changes  \par Echo:  nl LVEF  \par Cardiac Cath:

## 2020-08-20 NOTE — REASON FOR VISIT
[Coronary Artery Disease] : coronary artery disease [Follow-Up - Clinic] : a clinic follow-up of [Hyperlipidemia] : hyperlipidemia [Hypertension] : hypertension [Peripheral Vascular Disease] : peripheral vascular disease [FreeTextEntry1] : 80 M HTN hyperlipidemia PVD CAD s/p PCI for f/u. \par [phone]\par Dr. Ward \par

## 2020-08-20 NOTE — PHYSICAL EXAM
[General Appearance - Well Developed] : well developed [Normal Appearance] : normal appearance [Well Groomed] : well groomed [General Appearance - Well Nourished] : well nourished [No Deformities] : no deformities [General Appearance - In No Acute Distress] : no acute distress [Normal Conjunctiva] : the conjunctiva exhibited no abnormalities [Eyelids - No Xanthelasma] : the eyelids demonstrated no xanthelasmas [Normal Oral Mucosa] : normal oral mucosa [No Oral Pallor] : no oral pallor [No Oral Cyanosis] : no oral cyanosis [Normal Jugular Venous A Waves Present] : normal jugular venous A waves present [Normal Jugular Venous V Waves Present] : normal jugular venous V waves present [No Jugular Venous Weiner A Waves] : no jugular venous weiner A waves [Exaggerated Use Of Accessory Muscles For Inspiration] : no accessory muscle use [Respiration, Rhythm And Depth] : normal respiratory rhythm and effort [Auscultation Breath Sounds / Voice Sounds] : lungs were clear to auscultation bilaterally [Heart Rate And Rhythm] : heart rate and rhythm were normal [Heart Sounds] : normal S1 and S2 [Murmurs] : no murmurs present [Abdomen Soft] : soft [Abdomen Tenderness] : non-tender [Abdomen Mass (___ Cm)] : no abdominal mass palpated [Petechial Hemorrhages (___cm)] : no petechial hemorrhages [Cyanosis, Localized] : no localized cyanosis [Nail Clubbing] : no clubbing of the fingernails [Skin Color & Pigmentation] : normal skin color and pigmentation [] : no rash [No Venous Stasis] : no venous stasis [Skin Lesions] : no skin lesions [No Skin Ulcers] : no skin ulcer [No Xanthoma] : no  xanthoma was observed [Oriented To Time, Place, And Person] : oriented to person, place, and time [Affect] : the affect was normal [Mood] : the mood was normal [No Anxiety] : not feeling anxious

## 2020-08-20 NOTE — DISCUSSION/SUMMARY
[FreeTextEntry1] : 80 M HTN hyperlipidemia DM CAD s/p PCI PVD with claudication and SOB.\par CHECK ECHO.\par CHECK NST TO ASSESS PERFUSION.\par CHECK FRAN.\par Continue medications for HTN.\par Continue ASA and plavix.

## 2020-09-03 ENCOUNTER — APPOINTMENT (OUTPATIENT)
Dept: CARDIOLOGY | Facility: CLINIC | Age: 80
End: 2020-09-03
Payer: MEDICARE

## 2020-09-03 VITALS
SYSTOLIC BLOOD PRESSURE: 135 MMHG | DIASTOLIC BLOOD PRESSURE: 74 MMHG | RESPIRATION RATE: 15 BRPM | OXYGEN SATURATION: 97 % | TEMPERATURE: 97 F | WEIGHT: 177 LBS | HEART RATE: 62 BPM | BODY MASS INDEX: 27.72 KG/M2

## 2020-09-03 PROCEDURE — 99214 OFFICE O/P EST MOD 30 MIN: CPT

## 2020-09-03 PROCEDURE — 93000 ELECTROCARDIOGRAM COMPLETE: CPT

## 2020-09-03 NOTE — DISCUSSION/SUMMARY
[FreeTextEntry1] : 80 M HTN hyperlipidemia CAD s/p PCI PVD with CP and abnormal EKG and abnormal CT claudication.\par REFER FOR CARDIAC CATHETERIZATION.\par REFER FOR PERIPHERAL ANGIOGRAM.\par Continue ASA and plavix.\par Continue medications for HTN.

## 2020-09-03 NOTE — HISTORY OF PRESENT ILLNESS
[FreeTextEntry1] :  \par 1. HTN: controlled. Compliant with medications.\par 2. Hyperlipidemia: on statin. 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 He underwent repeat cardiac catheterization which showed LCx ISR. He underwent PCI and then underwent brachytherapy in 11/2019. He is on ASA and plavix. \par 4. PVD: patient has deep fem disease on the R on US in 2015. He had PTCA in 7/2019 at a hospital in [place]. \par Patient has noted intermittent CP, midline, substernal, without radiation, lasting minutes and associated with dizziness and SOB. His symptoms occur daily and are relieved with rest. He also has bilateral claudication and CT showed worsening peripheral vascular disease.\par

## 2020-09-03 NOTE — REASON FOR VISIT
[Follow-Up - Clinic] : a clinic follow-up of [Coronary Artery Disease] : coronary artery disease [Hyperlipidemia] : hyperlipidemia [Hypertension] : hypertension [Peripheral Vascular Disease] : peripheral vascular disease [FreeTextEntry1] : 80 M HTN hyperlipidemia PVD CAD s/p PCI for f/u. \par [phone]\par Dr. Ward \par

## 2020-09-03 NOTE — PHYSICAL EXAM
[General Appearance - Well Developed] : well developed [Normal Appearance] : normal appearance [General Appearance - Well Nourished] : well nourished [Well Groomed] : well groomed [Normal Conjunctiva] : the conjunctiva exhibited no abnormalities [No Deformities] : no deformities [General Appearance - In No Acute Distress] : no acute distress [Eyelids - No Xanthelasma] : the eyelids demonstrated no xanthelasmas [Normal Oral Mucosa] : normal oral mucosa [No Oral Pallor] : no oral pallor [No Oral Cyanosis] : no oral cyanosis [Normal Jugular Venous A Waves Present] : normal jugular venous A waves present [Normal Jugular Venous V Waves Present] : normal jugular venous V waves present [No Jugular Venous Weiner A Waves] : no jugular venous weiner A waves [Respiration, Rhythm And Depth] : normal respiratory rhythm and effort [Exaggerated Use Of Accessory Muscles For Inspiration] : no accessory muscle use [Heart Rate And Rhythm] : heart rate and rhythm were normal [Auscultation Breath Sounds / Voice Sounds] : lungs were clear to auscultation bilaterally [Abdomen Soft] : soft [Heart Sounds] : normal S1 and S2 [Murmurs] : no murmurs present [Abdomen Tenderness] : non-tender [Abdomen Mass (___ Cm)] : no abdominal mass palpated [Petechial Hemorrhages (___cm)] : no petechial hemorrhages [Nail Clubbing] : no clubbing of the fingernails [Cyanosis, Localized] : no localized cyanosis [] : no rash [Skin Color & Pigmentation] : normal skin color and pigmentation [No Venous Stasis] : no venous stasis [Skin Lesions] : no skin lesions [No Skin Ulcers] : no skin ulcer [No Xanthoma] : no  xanthoma was observed [Oriented To Time, Place, And Person] : oriented to person, place, and time [No Anxiety] : not feeling anxious [Mood] : the mood was normal [Affect] : the affect was normal

## 2020-09-09 VITALS
HEART RATE: 60 BPM | HEIGHT: 64 IN | TEMPERATURE: 97 F | OXYGEN SATURATION: 98 % | WEIGHT: 171.52 LBS | DIASTOLIC BLOOD PRESSURE: 62 MMHG | RESPIRATION RATE: 16 BRPM | SYSTOLIC BLOOD PRESSURE: 120 MMHG

## 2020-09-09 RX ORDER — CHLORHEXIDINE GLUCONATE 213 G/1000ML
1 SOLUTION TOPICAL ONCE
Refills: 0 | Status: DISCONTINUED | OUTPATIENT
Start: 2020-09-14 | End: 2020-09-28

## 2020-09-09 NOTE — H&P ADULT - HISTORY OF PRESENT ILLNESS
Skeleton  Verify Meds  Cardiologist: Dr. Marie  [facility]  Pharmacy  Escort    79 yo M, Faroese Speaking, former smoker, PMHx of HTN, HLD, DM-II, PAD (most recent peripheral cath at [facility] on 9/5/18 s/p directional atherectomy and PTA of L dSFA with DCB & PTA of L pop with angiosculpt) CAD s/p remote MI with multiple PCIs (last cardiac cath at [facility] 11/14/19: OM2 70% ISR s/p PTCA/Brachytherapy) who presented to Cardiologist Dr. Marie with c/o ___    Denies CP, SOB, dizziness, diaphoresis, palpitations, fatigue, LE edema, orthopnea, PND, syncope, N/V, abdominal pain, claudication, claudication at rest, non-healing wounds, change in temp/color/hair pattern of LE.     CTA abd/pelvis/LE 8/29/20: multilevel B/L iliac ad peripheral arterial disease not significantly changed from 2018.     In light of risk factors, known CAD/PAD, CCS Anginal Class ___ Sx, [place] Category ____ Sx, pt referred for cardiac cath & peripheral cath with possible intervention to r/o progressive disease.     Cath hx:   Cardiac cath at [facility] on 11/14/19: LMCA normal, p/m LAD stents patent, LCx mild LI, OM1 patent stent, OM2 70% ISR s/p PTCA/brachythrapy, RCA not injected (small non-dominant)    Peripheral cath @ [facility] on 9/5/18:  L - dSFA 70%, popliteal 50%, TP 80%, peroneal 100%, PT moderate diffuse disease; R  - pSFA patent stent, dSFA 50%, %, peroneal moderate, PT moderate [place]  Verify Meds  Cardiologist: Dr. Marie  [facility]  Pharmacy  Escort    81 yo M, Kinyarwanda Speaking, former smoker, PMHx of HTN, HLD, DM-II, PAD (most recent peripheral cath at [facility] on 9/5/18 s/p directional atherectomy and PTA of L dSFA with DCB & PTA of L pop with angiosculpt) CAD s/p remote MI with multiple PCIs (last cardiac cath at [facility] 11/14/19: OM2 70% ISR s/p PTCA/Brachytherapy) who presented to Cardiologist Dr. Marie with c/o intermittent, non-radiating SSCP described as ___ and of _/10 intensity with associated dizziness/AGUIRRE upon ambulation of __ city blocks, lasting few minutes before resolving with rest, over past ___.  Pt also c/o B/L LE claudication upon ambulation of ___ city blocks, resolving with rest, over past ____ .    Denies diaphoresis, palpitations, fatigue, LE edema, orthopnea, PND, syncope, N/V, abdominal pain, claudication at rest, non-healing wounds, change in temp/color/hair pattern of LE.     CTA abd/pelvis/LE 8/29/20: multilevel B/L iliac ad peripheral arterial disease not significantly changed from 2018.     In light of risk factors, known CAD/PAD, CCS Anginal Class ___ Sx, [place] Category ____ Sx, abnormal CTA B/L LE,  pt referred for cardiac cath & peripheral cath with possible intervention to r/o progressive disease.     Cath hx:   Cardiac cath at [facility] on 11/14/19: LMCA normal, p/m LAD stents patent, LCx mild LI, OM1 patent stent, OM2 70% ISR s/p PTCA/brachythrapy, RCA not injected (small non-dominant)    Peripheral cath @ [facility] on 9/5/18:  L - dSFA 70%, popliteal 50%, TP 80%, peroneal 100%, PT moderate diffuse disease; R  - pSFA patent stent, dSFA 50%, %, peroneal moderate, PT moderate Verify Meds  Cardiologist: Dr. Marie  Covid: Dr. Gillespie office 9/11/20 @ 9AM   Pharmacy: confirm on arrival  Escort: Transportation being set up   History obtained Via [place]  # Katie [id number]    81 yo M, Macedonian Speaking, former smoker, PMHx of HTN, HLD, DM-II, remote  DVT, PAD (most recent peripheral cath at [facility] on 9/5/18 s/p directional atherectomy and PTA of L dSFA with DCB & PTA of L pop with angiosculpt) CAD s/p remote MI with multiple PCIs (last cardiac cath at [facility] 11/14/19: OM2 70% ISR s/p PTCA/Brachytherapy) who presented to Cardiologist Dr. Marie with c/o intermittent, non-radiating SSCP described as 6-7/10 tightness and radiating to L arm, worsening since onset 1 month ago. The pain is the same as his angina symptoms in the past prior to previous angiogram. The pain is associated dizziness, dyspnea, Nausea and diaphoresis upon ambulation of  3 city blocks, lasting few minutes before resolving with rest. Pain has taken SL nitro with some relief of the pain. Patient denies palpitations, syncope Pt also c/o B/L LE claudication R>L upon ambulation of 4 city blocks causing him to stop walk, resolving with rest, over past  3-4 days. Patient denies nonhealing ulcers, claudication at rest, change in temp/color/hair pattern of LE.     CTA abd/pelvis/LE 8/29/20: multilevel B/L iliac ad peripheral arterial disease not significantly changed from 2018.     In light of risk factors, known CAD/PAD, CCS Anginal Class III Sx, [place] Category III Sx, abnormal CTA B/L LE,  pt referred for cardiac cath & peripheral cath with possible intervention to r/o progressive disease.     Cath hx:   Cardiac cath at [facility] on 11/14/19: LMCA normal, p/m LAD stents patent, LCx mild LI, OM1 patent stent, OM2 70% ISR s/p PTCA/brachythrapy, RCA not injected (small non-dominant)    Peripheral cath @ [facility] on 9/5/18:  L - dSFA 70%, popliteal 50%, TP 80%, peroneal 100%, PT moderate diffuse disease; R  - pSFA patent stent, dSFA 50%, %, peroneal moderate, PT moderate Verify Meds  Cardiologist: Dr. Marie  Covid: Dr. Gillespie office 9/11/20 @ 9AM. COVID NEGATIVE IN [facility]  Pharmacy: confirm on arrival  Escort: Transportation being set up   History obtained Via [place]  # Katie [id number]    81 yo M, Mongolian Speaking, former smoker, PMHx of HTN, HLD, DM-II, remote  DVT, PAD (most recent peripheral cath at [facility] on 9/5/18 s/p directional atherectomy and PTA of L dSFA with DCB & PTA of L pop with angiosculpt) CAD s/p remote MI with multiple PCIs (last cardiac cath at [facility] 11/14/19: OM2 70% ISR s/p PTCA/Brachytherapy) who presented to Cardiologist Dr. Marie with c/o intermittent, non-radiating SSCP described as 6-7/10 tightness and radiating to L arm, worsening since onset 1 month ago. The pain is the same as his angina symptoms in the past prior to previous angiogram. The pain is associated dizziness, dyspnea, Nausea and diaphoresis upon ambulation of  3 city blocks, lasting few minutes before resolving with rest. Pain has taken SL nitro with some relief of the pain. Patient denies palpitations, syncope Pt also c/o B/L LE claudication R>L upon ambulation of 4 city blocks causing him to stop walk, resolving with rest, over past  3-4 days. Patient denies nonhealing ulcers, claudication at rest, change in temp/color/hair pattern of LE.     CTA abd/pelvis/LE 8/29/20: multilevel B/L iliac ad peripheral arterial disease not significantly changed from 2018.     In light of risk factors, known CAD/PAD, CCS Anginal Class III Sx, [place] Category III Sx, abnormal CTA B/L LE,  pt referred for cardiac cath & peripheral cath with possible intervention to r/o progressive disease.     Cath hx:   Cardiac cath at [facility] on 11/14/19: LMCA normal, p/m LAD stents patent, LCx mild LI, OM1 patent stent, OM2 70% ISR s/p PTCA/brachythrapy, RCA not injected (small non-dominant)    Peripheral cath @ [facility] on 9/5/18:  L - dSFA 70%, popliteal 50%, TP 80%, peroneal 100%, PT moderate diffuse disease; R  - pSFA patent stent, dSFA 50%, %, peroneal moderate, PT moderate Covid: Dr. Gillsepie office 9/11/20 @ 9AM. COVID NEGATIVE IN [facility]  Pharmacy: confirm on arrival  Escort: Transportation being set up   History obtained Via [place]  # Katie [id number]    81 yo M, Chinese Speaking, former smoker, PMHx of HTN, HLD, DM-II, remote  DVT, PAD (most recent peripheral cath at [facility] on 9/5/18 s/p directional atherectomy and PTA of L dSFA with DCB & PTA of L pop with angiosculpt) CAD s/p remote MI with multiple PCIs (last cardiac cath at [facility] 11/14/19: OM2 70% ISR s/p PTCA/Brachytherapy) who presented to Cardiologist Dr. Marie with c/o intermittent, non-radiating SSCP described as 6-7/10 tightness and radiating to L arm, worsening since onset 1 month ago. The pain is the same as his angina symptoms in the past prior to previous angiogram. The pain is associated dizziness, dyspnea, Nausea and diaphoresis upon ambulation of  3 city blocks, lasting few minutes before resolving with rest. Pain has taken SL nitro with some relief of the pain. Patient denies palpitations, syncope Pt also c/o B/L LE claudication R>L upon ambulation of 4 city blocks causing him to stop walk, resolving with rest, over past  3-4 days. Patient denies nonhealing ulcers, claudication at rest, change in temp/color/hair pattern of LE.     CTA abd/pelvis/LE 8/29/20: multilevel B/L iliac ad peripheral arterial disease not significantly changed from 2018.     In light of risk factors, known CAD/PAD, CCS Anginal Class III Sx, [place] Category III Sx, abnormal CTA B/L LE,  pt referred for cardiac cath & peripheral cath with possible intervention to r/o progressive disease.     Cath hx:   Cardiac cath at [facility] on 11/14/19: LMCA normal, p/m LAD stents patent, LCx mild LI, OM1 patent stent, OM2 70% ISR s/p PTCA/brachythrapy, RCA not injected (small non-dominant)    Peripheral cath @ [facility] on 9/5/18:  L - dSFA 70%, popliteal 50%, TP 80%, peroneal 100%, PT moderate diffuse disease; R  - pSFA patent stent, dSFA 50%, %, peroneal moderate, PT moderate Covid: Dr. Gillespie office 9/11/20 @ 9AM. COVID NEGATIVE IN [facility]  Pharmacy: [facility] pharmacy  Escort: Transportation being set up   History obtained Via Vietnamese [place]  # Katie [id number]    81 yo M, Vietnamese Speaking, former smoker, PMHx of HTN, HLD, DM-II, remote  DVT, PAD (most recent peripheral cath at [facility] on 9/5/18 s/p directional atherectomy and PTA of L dSFA with DCB & PTA of L pop with angiosculpt) CAD s/p remote MI with multiple PCIs (last cardiac cath at [facility] 11/14/19: OM2 70% ISR s/p PTCA/Brachytherapy) who presented to Cardiologist Dr. Marie with c/o intermittent, non-radiating SSCP described as 6-7/10 tightness and radiating to L arm, worsening since onset 1 month ago. The pain is the same as his angina symptoms in the past prior to previous angiogram. The pain is associated dizziness, dyspnea, Nausea and diaphoresis upon ambulation of  3 city blocks, lasting few minutes before resolving with rest. Pain has taken SL nitro with some relief of the pain. Patient denies palpitations, syncope Pt also c/o B/L LE claudication R>L upon ambulation of 4 city blocks causing him to stop walk, resolving with rest, over past  3-4 days. Patient denies nonhealing ulcers, claudication at rest, change in temp/color/hair pattern of LE.     CTA abd/pelvis/LE 8/29/20: multilevel B/L iliac ad peripheral arterial disease not significantly changed from 2018.     In light of risk factors, known CAD/PAD, CCS Anginal Class III Sx, [place] Category III Sx, abnormal CTA B/L LE,  pt referred for cardiac cath & peripheral cath with possible intervention to r/o progressive disease.     Cath hx:   Cardiac cath at [facility] on 11/14/19: LMCA normal, p/m LAD stents patent, LCx mild LI, OM1 patent stent, OM2 70% ISR s/p PTCA/brachythrapy, RCA not injected (small non-dominant)    Peripheral cath @ [facility] on 9/5/18:  L - dSFA 70%, popliteal 50%, TP 80%, peroneal 100%, PT moderate diffuse disease; R  - pSFA patent stent, dSFA 50%, %, peroneal moderate, PT moderate

## 2020-09-09 NOTE — H&P ADULT - ASSESSMENT
79 yo M, Tamazight Speaking, former smoker, PMHx of HTN, HLD, DM-II, remote  DVT, PAD (most recent peripheral cath at [facility] on 9/5/18 s/p directional atherectomy and PTA of L dSFA with DCB & PTA of L pop with angiosculpt) CAD s/p remote MI with multiple PCIs (last cardiac cath at [facility] 11/14/19: OM2 70% ISR s/p PTCA/Brachytherapy) who in light of risk factors, known CAD/PAD, CCS Anginal Class III Sx, [place] Category III Sx, abnormal CTA B/L LE,  pt referred for cardiac cath & peripheral cath with possible intervention to r/o progressive disease.     ASA II, Mallampati II    Hgb/HCT: 13.6/40.7. Pt denies bleeding, BRBPR. Pt reports compliance with daily Aspirin 81mg and Plavix 75mg PO QD, last dose this AM. Pt given no further load.   NS @ 50 cc/hr given prior to cath. EF unknown. Pt euvolemic on exam. Cr. 1.00.   KASSIDY    Pt is a candidate for moderate sedation: Yes    Risks & benefits of procedure and alternative therapy have been explained to the patient including but not limited to: allergic reaction, bleeding w/possible need for blood transfusion, infection, renal and vascular compromise, limb damage, arrhythmia, stroke, vessel dissection/perforation, Myocardial infarction, emergent CABG. Informed consent obtained and in chart.     79 yo M, Telugu Speaking, former smoker, PMHx of HTN, HLD, DM-II, remote  DVT, PAD (most recent peripheral cath at [facility] on 9/5/18 s/p directional atherectomy and PTA of L dSFA with DCB & PTA of L pop with angiosculpt) CAD s/p remote MI with multiple PCIs (last cardiac cath at [facility] 11/14/19: OM2 70% ISR s/p PTCA/Brachytherapy) who in light of risk factors, known CAD/PAD, CCS Anginal Class III Sx, [place] Category III Sx, abnormal CTA B/L LE,  pt referred for cardiac cath & peripheral cath with possible intervention to r/o progressive disease.     ASA II, Mallampati II    Hgb/HCT: 13.6/40.7. Pt denies bleeding, BRBPR. Pt reports compliance with daily Aspirin 81mg and Plavix 75mg PO QD, last dose this AM. Pt given no further load.   NS @ 50 cc/hr given prior to cath. EF unknown. Pt euvolemic on exam. Cr. 1.01.  KASSIDY    Pt is a candidate for moderate sedation: Yes  Pt consented with [place]  ID # [id number]  Risks & benefits of procedure and alternative therapy have been explained to the patient including but not limited to: allergic reaction, bleeding w/possible need for blood transfusion, infection, renal and vascular compromise, limb damage, arrhythmia, stroke, vessel dissection/perforation, Myocardial infarction, emergent CABG. Informed consent obtained and in chart.

## 2020-09-09 NOTE — H&P ADULT - NSHPLABSRESULTS_GEN_ALL_CORE
EC.6   8.65  )-----------( 142      ( 14 Sep 2020 08:07 )             40.7       -14    137  |  104  |  22  ----------------------------<  178<H>  4.1   |  23  |  1.01    Ca    9.7      14 Sep 2020 08:07    TPro  7.6  /  Alb  4.7  /  TBili  0.4  /  DBili  x   /  AST  27  /  ALT  25  /  AlkPhos  58  09-14      PT/INR - ( 14 Sep 2020 08:07 )   PT: 12.1 sec;   INR: 1.01          PTT - ( 14 Sep 2020 08:07 )  PTT:30.7 sec    CARDIAC MARKERS ( 14 Sep 2020 08:07 )  x     / x     / 165 U/L / x     / 3.3 ng/mL ECG: NSR @ 60bpm, LAD, LVH, no STT changes                        13.6   8.65  )-----------( 142      ( 14 Sep 2020 08:07 )             40.7       09-14    137  |  104  |  22  ----------------------------<  178<H>  4.1   |  23  |  1.01    Ca    9.7      14 Sep 2020 08:07    TPro  7.6  /  Alb  4.7  /  TBili  0.4  /  DBili  x   /  AST  27  /  ALT  25  /  AlkPhos  58  09-14      PT/INR - ( 14 Sep 2020 08:07 )   PT: 12.1 sec;   INR: 1.01          PTT - ( 14 Sep 2020 08:07 )  PTT:30.7 sec    CARDIAC MARKERS ( 14 Sep 2020 08:07 )  x     / x     / 165 U/L / x     / 3.3 ng/mL

## 2020-09-11 ENCOUNTER — APPOINTMENT (OUTPATIENT)
Dept: DISASTER EMERGENCY | Facility: CLINIC | Age: 80
End: 2020-09-11

## 2020-09-11 DIAGNOSIS — Z01.818 ENCOUNTER FOR OTHER PREPROCEDURAL EXAMINATION: ICD-10-CM

## 2020-09-12 LAB — SARS-COV-2 N GENE NPH QL NAA+PROBE: NOT DETECTED

## 2020-09-14 ENCOUNTER — OUTPATIENT (OUTPATIENT)
Dept: OUTPATIENT SERVICES | Facility: HOSPITAL | Age: 80
LOS: 1 days | Discharge: HOME | End: 2020-09-14
Payer: MEDICARE

## 2020-09-14 DIAGNOSIS — Z98.89 OTHER SPECIFIED POSTPROCEDURAL STATES: Chronic | ICD-10-CM

## 2020-09-14 DIAGNOSIS — Z98.890 OTHER SPECIFIED POSTPROCEDURAL STATES: Chronic | ICD-10-CM

## 2020-09-14 DIAGNOSIS — Z98.61 CORONARY ANGIOPLASTY STATUS: Chronic | ICD-10-CM

## 2020-09-14 LAB
A1C WITH ESTIMATED AVERAGE GLUCOSE RESULT: 7.2 % — HIGH (ref 4–5.6)
ALBUMIN SERPL ELPH-MCNC: 4.7 G/DL — SIGNIFICANT CHANGE UP (ref 3.3–5)
ALP SERPL-CCNC: 58 U/L — SIGNIFICANT CHANGE UP (ref 40–120)
ALT FLD-CCNC: 25 U/L — SIGNIFICANT CHANGE UP (ref 10–45)
ANION GAP SERPL CALC-SCNC: 10 MMOL/L — SIGNIFICANT CHANGE UP (ref 5–17)
APTT BLD: 30.7 SEC — SIGNIFICANT CHANGE UP (ref 27.5–35.5)
AST SERPL-CCNC: 27 U/L — SIGNIFICANT CHANGE UP (ref 10–40)
BASOPHILS # BLD AUTO: 0.03 K/UL — SIGNIFICANT CHANGE UP (ref 0–0.2)
BASOPHILS NFR BLD AUTO: 0.3 % — SIGNIFICANT CHANGE UP (ref 0–2)
BILIRUB SERPL-MCNC: 0.4 MG/DL — SIGNIFICANT CHANGE UP (ref 0.2–1.2)
BUN SERPL-MCNC: 22 MG/DL — SIGNIFICANT CHANGE UP (ref 7–23)
CALCIUM SERPL-MCNC: 9.7 MG/DL — SIGNIFICANT CHANGE UP (ref 8.4–10.5)
CHLORIDE SERPL-SCNC: 104 MMOL/L — SIGNIFICANT CHANGE UP (ref 96–108)
CHOLEST SERPL-MCNC: 119 MG/DL — SIGNIFICANT CHANGE UP (ref 10–199)
CK MB CFR SERPL CALC: 3.3 NG/ML — SIGNIFICANT CHANGE UP (ref 0–6.7)
CK SERPL-CCNC: 165 U/L — SIGNIFICANT CHANGE UP (ref 30–200)
CO2 SERPL-SCNC: 23 MMOL/L — SIGNIFICANT CHANGE UP (ref 22–31)
CREAT SERPL-MCNC: 1.01 MG/DL — SIGNIFICANT CHANGE UP (ref 0.5–1.3)
CRP SERPL-MCNC: <0.03 MG/DL — SIGNIFICANT CHANGE UP (ref 0–0.4)
EOSINOPHIL # BLD AUTO: 0 K/UL — SIGNIFICANT CHANGE UP (ref 0–0.5)
EOSINOPHIL NFR BLD AUTO: 0 % — SIGNIFICANT CHANGE UP (ref 0–6)
ESTIMATED AVERAGE GLUCOSE: 160 MG/DL — HIGH (ref 68–114)
GLUCOSE SERPL-MCNC: 178 MG/DL — HIGH (ref 70–99)
HCT VFR BLD CALC: 40.7 % — SIGNIFICANT CHANGE UP (ref 39–50)
HDLC SERPL-MCNC: 54 MG/DL — SIGNIFICANT CHANGE UP
HGB BLD-MCNC: 13.6 G/DL — SIGNIFICANT CHANGE UP (ref 13–17)
IMM GRANULOCYTES NFR BLD AUTO: 0.8 % — SIGNIFICANT CHANGE UP (ref 0–1.5)
INR BLD: 1.01 — SIGNIFICANT CHANGE UP (ref 0.88–1.16)
LIPID PNL WITH DIRECT LDL SERPL: 52 MG/DL — SIGNIFICANT CHANGE UP
LYMPHOCYTES # BLD AUTO: 1.38 K/UL — SIGNIFICANT CHANGE UP (ref 1–3.3)
LYMPHOCYTES # BLD AUTO: 16 % — SIGNIFICANT CHANGE UP (ref 13–44)
MCHC RBC-ENTMCNC: 29.5 PG — SIGNIFICANT CHANGE UP (ref 27–34)
MCHC RBC-ENTMCNC: 33.4 GM/DL — SIGNIFICANT CHANGE UP (ref 32–36)
MCV RBC AUTO: 88.3 FL — SIGNIFICANT CHANGE UP (ref 80–100)
MONOCYTES # BLD AUTO: 0.4 K/UL — SIGNIFICANT CHANGE UP (ref 0–0.9)
MONOCYTES NFR BLD AUTO: 4.6 % — SIGNIFICANT CHANGE UP (ref 2–14)
NEUTROPHILS # BLD AUTO: 6.77 K/UL — SIGNIFICANT CHANGE UP (ref 1.8–7.4)
NEUTROPHILS NFR BLD AUTO: 78.3 % — HIGH (ref 43–77)
NRBC # BLD: 0 /100 WBCS — SIGNIFICANT CHANGE UP (ref 0–0)
PLATELET # BLD AUTO: 142 K/UL — LOW (ref 150–400)
POTASSIUM SERPL-MCNC: 4.1 MMOL/L — SIGNIFICANT CHANGE UP (ref 3.5–5.3)
POTASSIUM SERPL-SCNC: 4.1 MMOL/L — SIGNIFICANT CHANGE UP (ref 3.5–5.3)
PROT SERPL-MCNC: 7.6 G/DL — SIGNIFICANT CHANGE UP (ref 6–8.3)
PROTHROM AB SERPL-ACNC: 12.1 SEC — SIGNIFICANT CHANGE UP (ref 10.6–13.6)
RBC # BLD: 4.61 M/UL — SIGNIFICANT CHANGE UP (ref 4.2–5.8)
RBC # FLD: 13.3 % — SIGNIFICANT CHANGE UP (ref 10.3–14.5)
SODIUM SERPL-SCNC: 137 MMOL/L — SIGNIFICANT CHANGE UP (ref 135–145)
TOTAL CHOLESTEROL/HDL RATIO MEASUREMENT: 2.2 RATIO — LOW (ref 3.4–9.6)
TRIGL SERPL-MCNC: 64 MG/DL — SIGNIFICANT CHANGE UP (ref 10–149)
WBC # BLD: 8.65 K/UL — SIGNIFICANT CHANGE UP (ref 3.8–10.5)
WBC # FLD AUTO: 8.65 K/UL — SIGNIFICANT CHANGE UP (ref 3.8–10.5)

## 2020-09-14 PROCEDURE — 93458 L HRT ARTERY/VENTRICLE ANGIO: CPT | Mod: 26

## 2020-09-14 PROCEDURE — 86140 C-REACTIVE PROTEIN: CPT

## 2020-09-14 PROCEDURE — 93925 LOWER EXTREMITY STUDY: CPT | Mod: 26

## 2020-09-14 PROCEDURE — 93010 ELECTROCARDIOGRAM REPORT: CPT

## 2020-09-14 PROCEDURE — C1894: CPT

## 2020-09-14 PROCEDURE — C1753: CPT

## 2020-09-14 PROCEDURE — G0278: CPT

## 2020-09-14 PROCEDURE — 93925 LOWER EXTREMITY STUDY: CPT

## 2020-09-14 PROCEDURE — C1769: CPT

## 2020-09-14 PROCEDURE — 85025 COMPLETE CBC W/AUTO DIFF WBC: CPT

## 2020-09-14 PROCEDURE — 82962 GLUCOSE BLOOD TEST: CPT

## 2020-09-14 PROCEDURE — 93458 L HRT ARTERY/VENTRICLE ANGIO: CPT

## 2020-09-14 PROCEDURE — 83036 HEMOGLOBIN GLYCOSYLATED A1C: CPT

## 2020-09-14 PROCEDURE — 80061 LIPID PANEL: CPT

## 2020-09-14 PROCEDURE — 82553 CREATINE MB FRACTION: CPT

## 2020-09-14 PROCEDURE — C1887: CPT

## 2020-09-14 PROCEDURE — 80053 COMPREHEN METABOLIC PANEL: CPT

## 2020-09-14 PROCEDURE — 85730 THROMBOPLASTIN TIME PARTIAL: CPT

## 2020-09-14 PROCEDURE — 85610 PROTHROMBIN TIME: CPT

## 2020-09-14 PROCEDURE — 82550 ASSAY OF CK (CPK): CPT

## 2020-09-14 PROCEDURE — 93005 ELECTROCARDIOGRAM TRACING: CPT

## 2020-09-14 RX ORDER — DEXTROSE 50 % IN WATER 50 %
25 SYRINGE (ML) INTRAVENOUS ONCE
Refills: 0 | Status: DISCONTINUED | OUTPATIENT
Start: 2020-09-14 | End: 2020-09-28

## 2020-09-14 RX ORDER — GLUCAGON INJECTION, SOLUTION 0.5 MG/.1ML
1 INJECTION, SOLUTION SUBCUTANEOUS ONCE
Refills: 0 | Status: DISCONTINUED | OUTPATIENT
Start: 2020-09-14 | End: 2020-09-28

## 2020-09-14 RX ORDER — DEXTROSE 50 % IN WATER 50 %
12.5 SYRINGE (ML) INTRAVENOUS ONCE
Refills: 0 | Status: DISCONTINUED | OUTPATIENT
Start: 2020-09-14 | End: 2020-09-28

## 2020-09-14 RX ORDER — INSULIN LISPRO 100/ML
VIAL (ML) SUBCUTANEOUS ONCE
Refills: 0 | Status: DISCONTINUED | OUTPATIENT
Start: 2020-09-14 | End: 2020-09-28

## 2020-09-14 RX ORDER — DEXTROSE 50 % IN WATER 50 %
15 SYRINGE (ML) INTRAVENOUS ONCE
Refills: 0 | Status: DISCONTINUED | OUTPATIENT
Start: 2020-09-14 | End: 2020-09-28

## 2020-09-14 RX ORDER — SODIUM CHLORIDE 9 MG/ML
1000 INJECTION, SOLUTION INTRAVENOUS
Refills: 0 | Status: DISCONTINUED | OUTPATIENT
Start: 2020-09-14 | End: 2020-09-28

## 2020-09-14 RX ORDER — SODIUM CHLORIDE 9 MG/ML
500 INJECTION INTRAMUSCULAR; INTRAVENOUS; SUBCUTANEOUS
Refills: 0 | Status: DISCONTINUED | OUTPATIENT
Start: 2020-09-14 | End: 2020-09-28

## 2020-09-14 RX ORDER — SODIUM CHLORIDE 9 MG/ML
500 INJECTION INTRAMUSCULAR; INTRAVENOUS; SUBCUTANEOUS
Refills: 0 | Status: DISCONTINUED | OUTPATIENT
Start: 2020-09-14 | End: 2020-09-14

## 2020-09-14 RX ADMIN — SODIUM CHLORIDE 50 MILLILITER(S): 9 INJECTION INTRAMUSCULAR; INTRAVENOUS; SUBCUTANEOUS at 09:00

## 2020-09-14 NOTE — PROGRESS NOTE ADULT - SUBJECTIVE AND OBJECTIVE BOX
Interventional Cardiology PA SDA Discharge Note    Patient without complaints. Ambulated and voided without difficulties    Afebrile, VSS    Ext:    		  		        Right              Radial :   no hematoma,   no  bleeding, dressing; C/D/I      Pulses:    intact RAD to baseline     A/P:  79 yo M, [place] Speaking, former smoker, PMHx of HTN, HLD, DM-II, remote  DVT, PAD (most recent peripheral cath at [facility] on 9/5/18 s/p directional atherectomy and PTA of L dSFA with DCB & PTA of L pop with angiosculpt) CAD s/p remote MI with multiple PCIs (last cardiac cath at [facility] 11/14/19: OM2 70% ISR s/p PTCA/Brachytherapy) who in light of risk factors, known CAD/PAD, CCS Anginal Class III Sx, [place] Category III Sx, abnormal CTA B/L LE,  pt referred for cardiac cath & peripheral cath with possible intervention to r/o progressive disease.     Patient is s/p [facility] with b/l LE runoff 9/14/20: diagnostic [facility], IVUS of pLAD WNL. R radial access. B/l LE runoff with moderate R sided disease and patent dSFA stent / L sided patent dSFA stent. Known 100% L sided peroneal artery occulsion. Recommended for b/l LE arterial duplex prior to discharge.        1.	Stable for discharge as per attending Dr. Steele after bed rest, pt voids, groin/wrist stable and 30 minutes of ambulation.  2.	Follow-up with PMD/Cardiologist Frank in 1-2 weeks  3.	Discharged forms signed and copies in chart    Interventional Cardiology PA SDA Discharge Note    Patient without complaints. Ambulated and voided without difficulties    Afebrile, VSS    Ext:    		  		        Right              Radial :   no hematoma,   no  bleeding, dressing; C/D/I      Pulses:    intact RAD to baseline     A/P:  81 yo M, Bulgarian Speaking, former smoker, PMHx of HTN, HLD, DM-II, remote  DVT, PAD (most recent peripheral cath at [facility] on 9/5/18 s/p directional atherectomy and PTA of L dSFA with DCB & PTA of L pop with angiosculpt) CAD s/p remote MI with multiple PCIs (last cardiac cath at [facility] 11/14/19: OM2 70% ISR s/p PTCA/Brachytherapy) who in light of risk factors, known CAD/PAD, CCS Anginal Class III Sx, [place] Category III Sx, abnormal CTA B/L LE,  pt referred for cardiac cath & peripheral cath with possible intervention to r/o progressive disease.     Patient is s/p [facility] with b/l LE runoff 9/14/20: diagnostic C. LM normal, patent p-mLAd stent, IVUS of pLAD negative. OM2 stent patent, RCA nondominant /small. LVEF 50%, LVEDP 5mmHg, No AS/MR R radial access. B/l LE runoff with mild disease in b/l ileus. Patent L and R dSFA stent. Moderate b/l AT disease. 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 Recommended for b/l LE arterial duplex prior to discharge.      1.	Stable for discharge as per attending Dr. Steele after bed rest, pt voids, groin/wrist stable and 30 minutes of ambulation.  2.	Follow-up with PMD/Cardiologist Frank in 1-2 weeks  3.	Discharged forms signed and copies in chart

## 2020-09-18 ENCOUNTER — APPOINTMENT (OUTPATIENT)
Dept: CARDIOLOGY | Facility: CLINIC | Age: 80
End: 2020-09-18

## 2020-09-18 DIAGNOSIS — R06.02 SHORTNESS OF BREATH: ICD-10-CM

## 2020-09-22 DIAGNOSIS — R07.89 OTHER CHEST PAIN: ICD-10-CM

## 2020-09-22 DIAGNOSIS — Z95.5 PRESENCE OF CORONARY ANGIOPLASTY IMPLANT AND GRAFT: ICD-10-CM

## 2020-09-22 DIAGNOSIS — I25.118 ATHEROSCLEROTIC HEART DISEASE OF NATIVE CORONARY ARTERY WITH OTHER FORMS OF ANGINA PECTORIS: ICD-10-CM

## 2020-09-22 DIAGNOSIS — Z87.891 PERSONAL HISTORY OF NICOTINE DEPENDENCE: ICD-10-CM

## 2020-09-22 DIAGNOSIS — Z79.02 LONG TERM (CURRENT) USE OF ANTITHROMBOTICS/ANTIPLATELETS: ICD-10-CM

## 2020-09-22 DIAGNOSIS — Z88.0 ALLERGY STATUS TO PENICILLIN: ICD-10-CM

## 2020-09-22 DIAGNOSIS — I70.211 ATHEROSCLEROSIS OF NATIVE ARTERIES OF EXTREMITIES WITH INTERMITTENT CLAUDICATION, RIGHT LEG: ICD-10-CM

## 2020-09-22 DIAGNOSIS — Z95.820 PERIPHERAL VASCULAR ANGIOPLASTY STATUS WITH IMPLANTS AND GRAFTS: ICD-10-CM

## 2020-09-22 DIAGNOSIS — Z79.84 LONG TERM (CURRENT) USE OF ORAL HYPOGLYCEMIC DRUGS: ICD-10-CM

## 2020-09-22 DIAGNOSIS — E78.5 HYPERLIPIDEMIA, UNSPECIFIED: ICD-10-CM

## 2020-09-22 DIAGNOSIS — Z79.82 LONG TERM (CURRENT) USE OF ASPIRIN: ICD-10-CM

## 2020-09-22 DIAGNOSIS — E11.51 TYPE 2 DIABETES MELLITUS WITH DIABETIC PERIPHERAL ANGIOPATHY WITHOUT GANGRENE: ICD-10-CM

## 2020-09-22 DIAGNOSIS — I10 ESSENTIAL (PRIMARY) HYPERTENSION: ICD-10-CM

## 2020-09-22 DIAGNOSIS — Z86.718 PERSONAL HISTORY OF OTHER VENOUS THROMBOSIS AND EMBOLISM: ICD-10-CM

## 2020-09-30 ENCOUNTER — APPOINTMENT (OUTPATIENT)
Dept: CARDIOLOGY | Facility: CLINIC | Age: 80
End: 2020-09-30
Payer: MEDICARE

## 2020-09-30 VITALS
WEIGHT: 175 LBS | HEIGHT: 67 IN | SYSTOLIC BLOOD PRESSURE: 132 MMHG | BODY MASS INDEX: 27.47 KG/M2 | RESPIRATION RATE: 12 BRPM | DIASTOLIC BLOOD PRESSURE: 68 MMHG | HEART RATE: 76 BPM

## 2020-09-30 PROCEDURE — 93000 ELECTROCARDIOGRAM COMPLETE: CPT

## 2020-09-30 PROCEDURE — 99214 OFFICE O/P EST MOD 30 MIN: CPT

## 2020-09-30 NOTE — PHYSICAL EXAM
[General Appearance - Well Developed] : well developed [Normal Appearance] : normal appearance [Well Groomed] : well groomed [General Appearance - Well Nourished] : well nourished [No Deformities] : no deformities [General Appearance - In No Acute Distress] : no acute distress [Normal Conjunctiva] : the conjunctiva exhibited no abnormalities [Eyelids - No Xanthelasma] : the eyelids demonstrated no xanthelasmas [Normal Oral Mucosa] : normal oral mucosa [No Oral Pallor] : no oral pallor [No Oral Cyanosis] : no oral cyanosis [Normal Jugular Venous A Waves Present] : normal jugular venous A waves present [Normal Jugular Venous V Waves Present] : normal jugular venous V waves present [No Jugular Venous Weiner A Waves] : no jugular venous weiner A waves [Respiration, Rhythm And Depth] : normal respiratory rhythm and effort [Exaggerated Use Of Accessory Muscles For Inspiration] : no accessory muscle use [Auscultation Breath Sounds / Voice Sounds] : lungs were clear to auscultation bilaterally [Heart Rate And Rhythm] : heart rate and rhythm were normal [Heart Sounds] : normal S1 and S2 [Murmurs] : no murmurs present [Abdomen Soft] : soft [Abdomen Tenderness] : non-tender [Nail Clubbing] : no clubbing of the fingernails [Abdomen Mass (___ Cm)] : no abdominal mass palpated [Petechial Hemorrhages (___cm)] : no petechial hemorrhages [Cyanosis, Localized] : no localized cyanosis [Skin Color & Pigmentation] : normal skin color and pigmentation [] : no rash [No Venous Stasis] : no venous stasis [Skin Lesions] : no skin lesions [No Skin Ulcers] : no skin ulcer [No Xanthoma] : no  xanthoma was observed [Affect] : the affect was normal [Oriented To Time, Place, And Person] : oriented to person, place, and time [Mood] : the mood was normal [No Anxiety] : not feeling anxious

## 2020-09-30 NOTE — REASON FOR VISIT
[Follow-Up - Clinic] : a clinic follow-up of [Coronary Artery Disease] : coronary artery disease [Hyperlipidemia] : hyperlipidemia [Hypertension] : hypertension [Peripheral Vascular Disease] : peripheral vascular disease [FreeTextEntry1] : 80 M HTN hyperlipidemia PVD CAD s/p PCI for f/u. \par [phone]\par Dr. Ward \par

## 2020-09-30 NOTE — HISTORY OF PRESENT ILLNESS
[FreeTextEntry1] :  \par 1. HTN: controlled. Controlled. \par 2. Hyperlipidemia: on statin. No muscle pain is noted. 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 He underwent repeat cardiac catheterization which showed LCx ISR. He underwent PCI and then underwent brachytherapy in 2019. He is on ASA and plavix. \par 4. PVD: patient has deep fem disease on the R on US in . He had PTCA in 2019 at a hospital in [place]. \par Patient had non-obstructive CAD on cardiac catheterization. He is on ASA and plavix.\par \par  \par Cardiology Summary\par \par EK-3-20 NSR nl axis Tw inv V1 \par

## 2020-09-30 NOTE — DISCUSSION/SUMMARY
[FreeTextEntry1] : 80 M HTN hyperlipidemia PVD CAD s/p PCI for f/u.\par Non-obstructive CAD on cath.\par Continue medications for HTN.\par Continue statin.\par ASA and plavix for CAD.

## 2020-12-02 ENCOUNTER — APPOINTMENT (OUTPATIENT)
Dept: CARDIOLOGY | Facility: CLINIC | Age: 80
End: 2020-12-02
Payer: MEDICARE

## 2020-12-02 VITALS
BODY MASS INDEX: 27.47 KG/M2 | HEIGHT: 67 IN | WEIGHT: 175 LBS | SYSTOLIC BLOOD PRESSURE: 114 MMHG | DIASTOLIC BLOOD PRESSURE: 72 MMHG | HEART RATE: 81 BPM | RESPIRATION RATE: 15 BRPM

## 2020-12-02 PROCEDURE — 93000 ELECTROCARDIOGRAM COMPLETE: CPT

## 2020-12-02 PROCEDURE — 99072 ADDL SUPL MATRL&STAF TM PHE: CPT

## 2020-12-02 PROCEDURE — 99214 OFFICE O/P EST MOD 30 MIN: CPT

## 2020-12-02 NOTE — HISTORY OF PRESENT ILLNESS
[FreeTextEntry1] :  \par 1. HTN: controlled. Compliant with medications.\par 2. Hyperlipidemia: on statin. No SE noted. 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 He underwent repeat cardiac catheterization which showed LCx ISR. He underwent PCI and then underwent brachytherapy in 11/2019. He is on ASA and plavix. He denies CP or SOB. No cough or fevers noted. \par 4. PVD: patient has deep fem disease on the R on US in 2015. He had PTCA in 7/2019 at a hospital in [place]. \par He had peripheral angiogram that showed mostly below knee disease. \par

## 2020-12-02 NOTE — REASON FOR VISIT
[Follow-Up - Clinic] : a clinic follow-up of [Coronary Artery Disease] : coronary artery disease [Hyperlipidemia] : hyperlipidemia [Hypertension] : hypertension [FreeTextEntry1] : 80 M HTN hyperlipidemia PVD CAD s/p PCI for f/u. \par [phone]\par Dr. Ward \par

## 2020-12-02 NOTE — DISCUSSION/SUMMARY
[FreeTextEntry1] : 80 M HTN hyperlipidemia CAD s/p PCI PVD for f/u.\par COntinue ASA and plavix for CAD.\par Continue medications for HTN.\par Continue statin.\par Exercise and diet.

## 2021-02-03 ENCOUNTER — APPOINTMENT (OUTPATIENT)
Dept: CARDIOLOGY | Facility: CLINIC | Age: 81
End: 2021-02-03
Payer: MEDICARE

## 2021-02-03 VITALS
DIASTOLIC BLOOD PRESSURE: 56 MMHG | HEIGHT: 67 IN | BODY MASS INDEX: 27.47 KG/M2 | HEART RATE: 75 BPM | WEIGHT: 175 LBS | SYSTOLIC BLOOD PRESSURE: 111 MMHG | RESPIRATION RATE: 12 BRPM

## 2021-02-03 PROCEDURE — 93000 ELECTROCARDIOGRAM COMPLETE: CPT

## 2021-02-03 PROCEDURE — 99072 ADDL SUPL MATRL&STAF TM PHE: CPT

## 2021-02-03 PROCEDURE — 99214 OFFICE O/P EST MOD 30 MIN: CPT

## 2021-02-03 NOTE — REASON FOR VISIT
[Follow-Up - Clinic] : a clinic follow-up of [Coronary Artery Disease] : coronary artery disease [Hyperlipidemia] : hyperlipidemia [Hypertension] : hypertension [FreeTextEntry1] : 80 M HTN hyperlipidemia PVD CAD s/p PCI for f/u. \par [phone]\par Dr. Ward \par \par

## 2021-02-03 NOTE — HISTORY OF PRESENT ILLNESS
[FreeTextEntry1] :   \par 1. HTN: controlled. Controlled. \par 2. Hyperlipidemia: on statin. No muscle pain is noted. 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 He underwent repeat cardiac catheterization which showed LCx ISR. He underwent PCI and then underwent brachytherapy in 11/2019. He is on ASA and plavix.  \par 4. PVD: patient has deep fem disease on the R on US in 2015. He had PTCA in 7/2019 at a hospital in [place]. \par He had peripheral angiogram that showed mostly below knee disease. \par Patient reports improvement in CP and SOB. No cough or fevers noted. ET is stable. \par

## 2021-02-03 NOTE — DISCUSSION/SUMMARY
[FreeTextEntry1] : 80 M HTN hyperlipidemia CAD s/p PCI PVD for f/u.\par Continue medications for HTN.\par Continue statin.\par ASA and plavix for CAD s/p PCI.\par STOP PRASUGREL (instructions sent to pharmacy).

## 2021-02-03 NOTE — PHYSICAL EXAM
[General Appearance - Well Developed] : well developed [Normal Appearance] : normal appearance [Well Groomed] : well groomed [General Appearance - Well Nourished] : well nourished [No Deformities] : no deformities [General Appearance - In No Acute Distress] : no acute distress [Normal Conjunctiva] : the conjunctiva exhibited no abnormalities [Eyelids - No Xanthelasma] : the eyelids demonstrated no xanthelasmas [Normal Oral Mucosa] : normal oral mucosa [No Oral Pallor] : no oral pallor [No Oral Cyanosis] : no oral cyanosis [Normal Jugular Venous A Waves Present] : normal jugular venous A waves present [Normal Jugular Venous V Waves Present] : normal jugular venous V waves present [No Jugular Venous Weiner A Waves] : no jugular venous weiner A waves [Respiration, Rhythm And Depth] : normal respiratory rhythm and effort [Exaggerated Use Of Accessory Muscles For Inspiration] : no accessory muscle use [Auscultation Breath Sounds / Voice Sounds] : lungs were clear to auscultation bilaterally [Heart Rate And Rhythm] : heart rate and rhythm were normal [Heart Sounds] : normal S1 and S2 [Murmurs] : no murmurs present [Abdomen Soft] : soft [Abdomen Tenderness] : non-tender [Abdomen Mass (___ Cm)] : no abdominal mass palpated [Cyanosis, Localized] : no localized cyanosis [Nail Clubbing] : no clubbing of the fingernails [Petechial Hemorrhages (___cm)] : no petechial hemorrhages [Skin Color & Pigmentation] : normal skin color and pigmentation [] : no rash [No Venous Stasis] : no venous stasis [Skin Lesions] : no skin lesions [No Skin Ulcers] : no skin ulcer [No Xanthoma] : no  xanthoma was observed [Oriented To Time, Place, And Person] : oriented to person, place, and time [Affect] : the affect was normal [Mood] : the mood was normal [No Anxiety] : not feeling anxious

## 2021-04-07 ENCOUNTER — APPOINTMENT (OUTPATIENT)
Dept: CARDIOLOGY | Facility: CLINIC | Age: 81
End: 2021-04-07

## 2021-04-21 ENCOUNTER — APPOINTMENT (OUTPATIENT)
Dept: CARDIOLOGY | Facility: CLINIC | Age: 81
End: 2021-04-21
Payer: MEDICARE

## 2021-04-21 VITALS
SYSTOLIC BLOOD PRESSURE: 142 MMHG | HEIGHT: 67 IN | DIASTOLIC BLOOD PRESSURE: 78 MMHG | HEART RATE: 83 BPM | RESPIRATION RATE: 15 BRPM | BODY MASS INDEX: 29.03 KG/M2 | WEIGHT: 185 LBS

## 2021-04-21 PROCEDURE — 93000 ELECTROCARDIOGRAM COMPLETE: CPT

## 2021-04-21 PROCEDURE — 99214 OFFICE O/P EST MOD 30 MIN: CPT

## 2021-04-21 NOTE — DISCUSSION/SUMMARY
[FreeTextEntry1] : 80 M HTN DM hyperlipidemia CAD s/p PCI PVD for dental surgery.\par STOP ASA AND PLAVIX FOR 7 DAYS PRIOR TO PROCEDURE.\par PATIENT IS AT AN INTERMEDIATE LEVEL OF CARDIAC RISK FOR LOW RISK SURGERY.\par Continue medications for HTN and hyperlipidemia.

## 2021-04-21 NOTE — PHYSICAL EXAM
[General Appearance - Well Developed] : well developed [Normal Appearance] : normal appearance [Well Groomed] : well groomed [General Appearance - Well Nourished] : well nourished [No Deformities] : no deformities [General Appearance - In No Acute Distress] : no acute distress [Normal Conjunctiva] : the conjunctiva exhibited no abnormalities [Eyelids - No Xanthelasma] : the eyelids demonstrated no xanthelasmas [Normal Oral Mucosa] : normal oral mucosa [No Oral Pallor] : no oral pallor [No Oral Cyanosis] : no oral cyanosis [Normal Jugular Venous A Waves Present] : normal jugular venous A waves present [Normal Jugular Venous V Waves Present] : normal jugular venous V waves present [No Jugular Venous Weiner A Waves] : no jugular venous weiner A waves [Respiration, Rhythm And Depth] : normal respiratory rhythm and effort [Exaggerated Use Of Accessory Muscles For Inspiration] : no accessory muscle use [Auscultation Breath Sounds / Voice Sounds] : lungs were clear to auscultation bilaterally [Heart Rate And Rhythm] : heart rate and rhythm were normal [Heart Sounds] : normal S1 and S2 [Murmurs] : no murmurs present [Abdomen Soft] : soft [Abdomen Tenderness] : non-tender [Nail Clubbing] : no clubbing of the fingernails [Abdomen Mass (___ Cm)] : no abdominal mass palpated [Cyanosis, Localized] : no localized cyanosis [Petechial Hemorrhages (___cm)] : no petechial hemorrhages [Skin Color & Pigmentation] : normal skin color and pigmentation [No Venous Stasis] : no venous stasis [] : no rash [Skin Lesions] : no skin lesions [No Skin Ulcers] : no skin ulcer [No Xanthoma] : no  xanthoma was observed [Oriented To Time, Place, And Person] : oriented to person, place, and time [Affect] : the affect was normal [Mood] : the mood was normal [No Anxiety] : not feeling anxious

## 2021-04-21 NOTE — HISTORY OF PRESENT ILLNESS
[FreeTextEntry1] :   \par 1. HTN: controlled. Compliant with medications.\par 2. Hyperlipidemia: on statin. No SE noted. 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 He underwent repeat cardiac catheterization which showed LCx ISR.He underwent PCI and then underwent brachytherapy in 11/2019. He is on ASA and plavix. \par 4. PVD: patient has deep fem disease on the R on US in 2015. He had PTCA in 7/2019 at a hospital in [place]. \par He had peripheral angiogram that showed mostly below knee disease. \par \par He denies CP or SOB. No cough or fevers noted.\par Patient is compliant with medications.\par PATIENT IS FOR DENTAL SURGERY.

## 2021-04-21 NOTE — REASON FOR VISIT
[Follow-Up - Clinic] : a clinic follow-up of [Coronary Artery Disease] : coronary artery disease [Hyperlipidemia] : hyperlipidemia [Hypertension] : hypertension [Peripheral Vascular Disease] : peripheral vascular disease [FreeTextEntry1] : 80 M HTN hyperlipidemia PVD CAD s/p PCI for f/u. \par [phone]\par Dr. Ward \par \par \par

## 2021-06-23 ENCOUNTER — APPOINTMENT (OUTPATIENT)
Dept: CARDIOLOGY | Facility: CLINIC | Age: 81
End: 2021-06-23
Payer: MEDICARE

## 2021-06-23 VITALS
DIASTOLIC BLOOD PRESSURE: 82 MMHG | SYSTOLIC BLOOD PRESSURE: 146 MMHG | WEIGHT: 184 LBS | RESPIRATION RATE: 12 BRPM | HEART RATE: 76 BPM | HEIGHT: 67 IN | BODY MASS INDEX: 28.88 KG/M2

## 2021-06-23 PROCEDURE — 93000 ELECTROCARDIOGRAM COMPLETE: CPT

## 2021-06-23 PROCEDURE — 99214 OFFICE O/P EST MOD 30 MIN: CPT

## 2021-06-23 NOTE — DISCUSSION/SUMMARY
[FreeTextEntry1] : 81 M DM HTN hyperlipidemia CAD s/p PCI PVD for f/u.\par EKG for CAD s/p PCI.\par Continue medications for HTN.\par Continue statin.\par Continue ASA and plavix for CAD s/p PCI.

## 2021-06-23 NOTE — REASON FOR VISIT
[Hyperlipidemia] : hyperlipidemia [Hypertension] : hypertension [Coronary Artery Disease] : coronary artery disease [FreeTextEntry1] : 81 M HTN hyperlipidemia PVD CAD s/p PCI for f/u. \par [phone]\par Dr. Ward \par \par

## 2021-06-23 NOTE — HISTORY OF PRESENT ILLNESS
[FreeTextEntry1] :  \par 1. HTN: controlled. Controlled. \par 2. Hyperlipidemia: on statin. No muscle pain is noted. 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 He underwent repeat cardiac catheterization which showed LCx ISR.He underwent PCI and then underwent brachytherapy in 11/2019. He is on ASA and plavix. \par 4. PVD: patient has deep fem disease on the R on US in 2015. He had PTCA in 7/2019 at a hospital in [place]. \par He had peripheral angiogram that showed mostly below knee disease. He underwent PTCA with Dr. Steele.\par \par Patient denies CP or SOB.\par No cough or fevers.\par The patient received his COVID vaccine. \par

## 2021-09-01 ENCOUNTER — APPOINTMENT (OUTPATIENT)
Dept: CARDIOLOGY | Facility: CLINIC | Age: 81
End: 2021-09-01
Payer: MEDICARE

## 2021-09-01 VITALS
RESPIRATION RATE: 16 BRPM | DIASTOLIC BLOOD PRESSURE: 72 MMHG | BODY MASS INDEX: 29.03 KG/M2 | HEIGHT: 67 IN | SYSTOLIC BLOOD PRESSURE: 140 MMHG | WEIGHT: 185 LBS | HEART RATE: 82 BPM

## 2021-09-01 PROCEDURE — 99214 OFFICE O/P EST MOD 30 MIN: CPT

## 2021-09-01 NOTE — REASON FOR VISIT
[Hyperlipidemia] : hyperlipidemia [Hypertension] : hypertension [FreeTextEntry1] : 81 M HTN hyperlipidemia PVD CAD s/p PCI for f/u. \par [phone]\par Dr. Ward \par

## 2021-09-01 NOTE — DISCUSSION/SUMMARY
[FreeTextEntry1] : 81 M DM HTN hyperlipidemia CAD s/p PCI PVD for f/u.\par Continue medications for HTN and hyperlipidemia.\par Continue ASA and plavix for CAD s/p PCI.\par Patient received his COVID vaccine.\par

## 2021-09-01 NOTE — HISTORY OF PRESENT ILLNESS
[FreeTextEntry1] : \par  \par 1. HTN: controlled. Compliant with medications. \par 2. Hyperlipidemia: on statin. No SE are noted. 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 He underwent repeat cardiac catheterization which showed LCx ISR.He underwent PCI and then underwent brachytherapy in 11/2019. He is on ASA and plavix. \par 4. PVD: patient has deep fem disease on the R on US in 2015. He had PTCA in 7/2019 at a hospital in [place]. \par He had peripheral angiogram that showed mostly below knee disease. He underwent PTCA with Dr. Steele.\par  \par The patient received his COVID vaccine. \par \par The patient denies any new symptoms.\par His ET is stable.\par \par The patient is compliant with medications.

## 2021-11-03 ENCOUNTER — APPOINTMENT (OUTPATIENT)
Dept: CARDIOLOGY | Facility: CLINIC | Age: 81
End: 2021-11-03
Payer: MEDICARE

## 2021-11-03 VITALS
RESPIRATION RATE: 12 BRPM | HEIGHT: 67 IN | HEART RATE: 76 BPM | SYSTOLIC BLOOD PRESSURE: 122 MMHG | BODY MASS INDEX: 28.88 KG/M2 | DIASTOLIC BLOOD PRESSURE: 68 MMHG | WEIGHT: 184 LBS

## 2021-11-03 PROCEDURE — 99214 OFFICE O/P EST MOD 30 MIN: CPT

## 2021-11-03 NOTE — DISCUSSION/SUMMARY
[FreeTextEntry1] : 81 M HTN hyperlipidemia CAD s/p PCI PVD for f/u.\par Continue medications for HTN and hyperlipidemia.\par Continue ASA and plavix for CAD s/p PCI.\par Patient received his COVID vaccine.

## 2021-11-03 NOTE — HISTORY OF PRESENT ILLNESS
[FreeTextEntry1] :  \par 1. HTN: controlled. BP controlled.\par 2. Hyperlipidemia: on statin. No muscle pain is noted.\par 3. 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 He underwent repeat cardiac catheterization which showed LCx ISR.He underwent PCI and then underwent brachytherapy in 11/2019. He is on ASA and plavix. \par 4. PVD: patient has deep fem disease on the R on US in 2015. He had PTCA in 7/2019 at a hospital in [place]. \par He had peripheral angiogram that showed mostly below knee disease. He underwent PTCA with Dr. Steele.\par  \par The patient received his COVID vaccine. \par \par The patient denies any new symptoms. The patient denies CP. He received his COVID vaccine.\par He denies cough or fevers.\par

## 2021-11-03 NOTE — REASON FOR VISIT
[Hyperlipidemia] : hyperlipidemia [Hypertension] : hypertension [Coronary Artery Disease] : coronary artery disease [Carotid, Aortic and Peripheral Vascular Disease] : carotid, aortic and peripheral vascular disease [FreeTextEntry1] : 81 M HTN hyperlipidemia PVD CAD s/p PCI for f/u. \par [phone]\par Dr. Ward \par

## 2021-12-04 NOTE — PATIENT PROFILE ADULT - NSPROPTRIGHTCAREGIVER_GEN_A_NUR
INTERVAL HPI/OVERNIGHT EVENTS:  Seen and examined at bedside. 93 on 4L NC. OOB to chair      PRESSORS: [  ] YES [ X ] NO  WHICH:    Antimicrobial:  remdesivir  IVPB   IV Intermittent   remdesivir  IVPB 100 milliGRAM(s) IV Intermittent every 24 hours    Cardiovascular:  metoprolol succinate ER 25 milliGRAM(s) Oral daily    Pulmonary:  ALBUTerol    90 MICROgram(s) HFA Inhaler 2 Puff(s) Inhalation every 6 hours PRN  guaiFENesin Oral Liquid (Sugar-Free) 200 milliGRAM(s) Oral every 6 hours PRN    Hematalogic:  enoxaparin Injectable 40 milliGRAM(s) SubCutaneous daily    Other:  acetaminophen     Tablet .. 650 milliGRAM(s) Oral every 6 hours PRN  chlorhexidine 2% Cloths 1 Application(s) Topical <User Schedule>  dexAMETHasone  Injectable 6 milliGRAM(s) IV Push daily  influenza  Vaccine (HIGH DOSE) 0.7 milliLiter(s) IntraMuscular once  insulin lispro (ADMELOG) corrective regimen sliding scale   SubCutaneous three times a day before meals  insulin lispro (ADMELOG) corrective regimen sliding scale   SubCutaneous at bedtime  levothyroxine 88 MICROGram(s) Oral daily  pantoprazole    Tablet 40 milliGRAM(s) Oral before breakfast  senna 2 Tablet(s) Oral at bedtime    acetaminophen     Tablet .. 650 milliGRAM(s) Oral every 6 hours PRN  ALBUTerol    90 MICROgram(s) HFA Inhaler 2 Puff(s) Inhalation every 6 hours PRN  chlorhexidine 2% Cloths 1 Application(s) Topical <User Schedule>  dexAMETHasone  Injectable 6 milliGRAM(s) IV Push daily  enoxaparin Injectable 40 milliGRAM(s) SubCutaneous daily  guaiFENesin Oral Liquid (Sugar-Free) 200 milliGRAM(s) Oral every 6 hours PRN  influenza  Vaccine (HIGH DOSE) 0.7 milliLiter(s) IntraMuscular once  insulin lispro (ADMELOG) corrective regimen sliding scale   SubCutaneous three times a day before meals  insulin lispro (ADMELOG) corrective regimen sliding scale   SubCutaneous at bedtime  levothyroxine 88 MICROGram(s) Oral daily  metoprolol succinate ER 25 milliGRAM(s) Oral daily  pantoprazole    Tablet 40 milliGRAM(s) Oral before breakfast  remdesivir  IVPB   IV Intermittent   remdesivir  IVPB 100 milliGRAM(s) IV Intermittent every 24 hours  senna 2 Tablet(s) Oral at bedtime    Drug Dosing Weight  Height (cm): 162.6 (03 Dec 2021 15:35)  Weight (kg): 77.7 (04 Dec 2021 05:00)  BMI (kg/m2): 29.4 (04 Dec 2021 05:00)  BSA (m2): 1.83 (04 Dec 2021 05:00)    CENTRAL LINE: [ ] YES [X ] NO  LOCATION:   DATE INSERTED:  REMOVE: [ ] YES [ ] NO  EXPLAIN:    ALMEIDA: [ ] YES [X ] NO    DATE INSERTED:  REMOVE:  [ ] YES [ ] NO  EXPLAIN:    A-LINE:  [ ] YES [ X] NO  LOCATION:   DATE INSERTED:  REMOVE:  [ ] YES [ ] NO  EXPLAIN:    PMH -reviewed admission note, no change since admission  PAST MEDICAL & SURGICAL HISTORY:  Hypertension    Diabetes mellitus    Hypothyroidism    History of cholecystectomy    History of appendectomy        ICU Vital Signs Last 24 Hrs  T(C): 36.6 (04 Dec 2021 05:00), Max: 37.3 (03 Dec 2021 15:35)  T(F): 97.8 (04 Dec 2021 05:00), Max: 99.1 (03 Dec 2021 15:35)  HR: 91 (04 Dec 2021 06:00) (88 - 105)  BP: 130/58 (04 Dec 2021 06:00) (105/83 - 135/79)  BP(mean): 85 (04 Dec 2021 06:00) (75 - 85)  ABP: --  ABP(mean): --  RR: 48 (04 Dec 2021 06:00) (19 - 48)  SpO2: 100% (04 Dec 2021 06:00) (94% - 100%)            12-03 @ 07:01  -  12-04 @ 07:00  --------------------------------------------------------  IN: 0 mL / OUT: 550 mL / NET: -550 mL            PHYSICAL EXAM:  GENERAL: NAD, on NRB   HEAD:  Atraumatic, Normocephalic  EYES: EOMI, PERRLA, conjunctiva and sclera clear  ENMT: Dry Mucosa  NECK: Supple, No JVD, Normal thyroid  NERVOUS SYSTEM:  Alert & Oriented X3, Good concentration; Motor Strength 5/5 B/L upper and lower extremities; DTRs 2+ intact and symmetric  CHEST/LUNG: bilateral fine crackles   HEART: Regular rate and rhythm; No murmurs, rubs, or gallops  ABDOMEN: Soft, Nontender, Nondistended; Bowel sounds present  EXTREMITIES:  2+ Peripheral Pulses, No clubbing, cyanosis, or edema  LYMPH: No lymphadenopathy noted  SKIN: No rashes or lesions        LABS:  CBC Full  -  ( 04 Dec 2021 05:50 )  WBC Count : 6.89 K/uL  RBC Count : 4.33 M/uL  Hemoglobin : 12.6 g/dL  Hematocrit : 38.2 %  Platelet Count - Automated : 239 K/uL  Mean Cell Volume : 88.2 fl  Mean Cell Hemoglobin : 29.1 pg  Mean Cell Hemoglobin Concentration : 33.0 gm/dL  Auto Neutrophil # : 5.79 K/uL  Auto Lymphocyte # : 0.55 K/uL  Auto Monocyte # : 0.34 K/uL  Auto Eosinophil # : 0.00 K/uL  Auto Basophil # : 0.07 K/uL  Auto Neutrophil % : 84.0 %  Auto Lymphocyte % : 8.0 %  Auto Monocyte % : 5.0 %  Auto Eosinophil % : 0.0 %  Auto Basophil % : 1.0 %    12-04    138  |  102  |  15  ----------------------------<  210<H>  4.9   |  22  |  0.80    Ca    9.1      04 Dec 2021 05:50  Phos  4.0     12-04  Mg     2.3     12-04    TPro  7.3  /  Alb  2.5<L>  /  TBili  0.5  /  DBili  x   /  AST  23  /  ALT  22  /  AlkPhos  61  12-04    PT/INR - ( 04 Dec 2021 05:45 )   PT: 15.0 sec;   INR: 1.28 ratio         PTT - ( 03 Dec 2021 16:21 )  PTT:33.7 sec        RADIOLOGY & ADDITIONAL STUDIES REVIEWED:  ***    [ ]GOALS OF CARE DISCUSSION WITH PATIENT/FAMILY/PROXY:    CRITICAL CARE TIME SPENT: 35 minutes declines

## 2022-01-01 NOTE — H&P PST ADULT - NECK DETAILS

## 2022-01-12 ENCOUNTER — APPOINTMENT (OUTPATIENT)
Dept: CARDIOLOGY | Facility: CLINIC | Age: 82
End: 2022-01-12
Payer: MEDICARE

## 2022-01-12 VITALS
SYSTOLIC BLOOD PRESSURE: 143 MMHG | DIASTOLIC BLOOD PRESSURE: 78 MMHG | HEART RATE: 65 BPM | HEIGHT: 67 IN | WEIGHT: 184 LBS | BODY MASS INDEX: 28.88 KG/M2 | RESPIRATION RATE: 15 BRPM

## 2022-01-12 PROCEDURE — 93000 ELECTROCARDIOGRAM COMPLETE: CPT

## 2022-01-12 PROCEDURE — 99214 OFFICE O/P EST MOD 30 MIN: CPT

## 2022-01-12 NOTE — DISCUSSION/SUMMARY
[FreeTextEntry1] : 81 M HTN hyperlipidemia CAD s/p PCI PVD for f/u.\par Continue medications for HTN.\par Continue statin for hyperlipidemia.\par Continue ASA 81 and plavix 75 for CAD s/p PCI.\par Continue exercise for PVD.\par Patient received his COVID vaccine.\par

## 2022-01-12 NOTE — REASON FOR VISIT
[Hyperlipidemia] : hyperlipidemia [Hypertension] : hypertension [Coronary Artery Disease] : coronary artery disease [Carotid, Aortic and Peripheral Vascular Disease] : carotid, aortic and peripheral vascular disease [FreeTextEntry1] : 81 M HTN hyperlipidemia PVD CAD s/p PCI for f/u. \par [phone]\par Dr. Ward \par

## 2022-01-12 NOTE — HISTORY OF PRESENT ILLNESS
[FreeTextEntry1] : 81 M HTN hyperlipidemia PVD CAD s/p PCI for f/u. \par [phone]\par Dr. Ward \par \par   \par 1. HTN: controlled. No SE are noted.\par 2. Hyperlipidemia: on statin. The lipid profile is followed by PMD.\par 3. 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 He underwent repeat cardiac catheterization which showed LCx ISR.He underwent PCI and then underwent brachytherapy in 11/2019. He is on ASA and plavix. \par 4. PVD: patient has deep fem disease on the R on US in 2015. He had PTCA in 7/2019 at a hospital in [place]. \par He had peripheral angiogram that showed mostly below knee disease. He underwent PTCA with Dr. Steele.\par \par The patient denies CP or SOB. No cough or fevers noted. ET is stable.\par Patient is compliant with medications.\par \par He received his booster COVID vaccine.

## 2022-03-09 ENCOUNTER — APPOINTMENT (OUTPATIENT)
Dept: CARDIOLOGY | Facility: CLINIC | Age: 82
End: 2022-03-09
Payer: MEDICARE

## 2022-03-09 VITALS
RESPIRATION RATE: 12 BRPM | WEIGHT: 183 LBS | HEART RATE: 76 BPM | HEIGHT: 67 IN | BODY MASS INDEX: 28.72 KG/M2 | DIASTOLIC BLOOD PRESSURE: 72 MMHG | SYSTOLIC BLOOD PRESSURE: 135 MMHG

## 2022-03-09 PROCEDURE — 99214 OFFICE O/P EST MOD 30 MIN: CPT

## 2022-03-09 RX ORDER — NITROGLYCERIN 0.4 MG/1
0.4 TABLET SUBLINGUAL
Qty: 100 | Refills: 0 | Status: ACTIVE | COMMUNITY
Start: 2019-02-13 | End: 1900-01-01

## 2022-03-09 RX ORDER — CLOPIDOGREL BISULFATE 75 MG/1
75 TABLET, FILM COATED ORAL
Qty: 30 | Refills: 5 | Status: ACTIVE | COMMUNITY
Start: 2018-04-11 | End: 1900-01-01

## 2022-03-09 NOTE — DISCUSSION/SUMMARY
[FreeTextEntry1] : 81 M HTN hyperlipidemia CAD s/p PCI PVD for f/u.\par Patient does not want testing for now.\par Continue medications for HTN.\par Continue statin for hyperlipidemia.\par Continue ASA 81 and plavix 75 for CAD s/p PCI.\par Symptom monitoring.\par Use NG prn for CP.\par

## 2022-03-09 NOTE — HISTORY OF PRESENT ILLNESS
[FreeTextEntry1] : 1. HTN: controlled. Compliant with medications.\par 2. Hyperlipidemia: on statin. No muscle pain is noted.\par 3. 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 He underwent repeat cardiac catheterization which showed LCx ISR.He underwent PCI and then underwent brachytherapy in 11/2019. He is on ASA and plavix. \par 4. PVD: patient has deep fem disease on the R on US in 2015. He had PTCA in 7/2019 at a hospital in [place]. \par He had peripheral angiogram that showed mostly below knee disease. He underwent PTCA with Dr. Steele.\par  \par \par He received his booster COVID vaccine. \par \par THe patient has had intermittent CP without radiation lasting minutes. His symptoms are exertional and relieved with rest. He denies cough or fevers. ET is stable.\par He has not used NG recently.\par

## 2022-03-09 NOTE — REASON FOR VISIT
[Hyperlipidemia] : hyperlipidemia [Hypertension] : hypertension [Coronary Artery Disease] : coronary artery disease [Carotid, Aortic and Peripheral Vascular Disease] : carotid, aortic and peripheral vascular disease [FreeTextEntry1] : 81 M HTN hyperlipidemia PVD CAD s/p PCI for f/u. \par [phone]\par Dr. Ward \par

## 2022-04-13 ENCOUNTER — APPOINTMENT (OUTPATIENT)
Dept: CARDIOLOGY | Facility: CLINIC | Age: 82
End: 2022-04-13

## 2022-04-14 ENCOUNTER — APPOINTMENT (OUTPATIENT)
Dept: CARDIOLOGY | Facility: CLINIC | Age: 82
End: 2022-04-14
Payer: MEDICARE

## 2022-04-14 VITALS
BODY MASS INDEX: 28.35 KG/M2 | HEART RATE: 81 BPM | DIASTOLIC BLOOD PRESSURE: 57 MMHG | SYSTOLIC BLOOD PRESSURE: 104 MMHG | OXYGEN SATURATION: 97 % | TEMPERATURE: 98.2 F | RESPIRATION RATE: 12 BRPM | WEIGHT: 181 LBS

## 2022-04-14 PROCEDURE — 93000 ELECTROCARDIOGRAM COMPLETE: CPT

## 2022-04-14 PROCEDURE — 99214 OFFICE O/P EST MOD 30 MIN: CPT

## 2022-04-14 NOTE — REASON FOR VISIT
[Hyperlipidemia] : hyperlipidemia [Hypertension] : hypertension [Coronary Artery Disease] : coronary artery disease [FreeTextEntry1] : 81 M HTN hyperlipidemia PVD CAD s/p PCI for f/u. \par [phone]\par Dr. Ward \par Echo 2020\par

## 2022-04-14 NOTE — HISTORY OF PRESENT ILLNESS
[FreeTextEntry1] :  \par 1. HTN: controlled. Controlled.\par 2. Hyperlipidemia: on statin. No SE are noted.\par 3. 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 He underwent repeat cardiac catheterization which showed LCx ISR.He underwent PCI and then underwent brachytherapy in 11/2019. He is on ASA and plavix. \par 4. PVD: patient has deep fem disease on the R on US in 2015. He had PTCA in 7/2019 at a hospital in [place]. \par He had peripheral angiogram that showed mostly below knee disease. He underwent PTCA with Dr. Steele.\par  \par \par He received his booster COVID vaccine. \par \par He has noted worsening CP and SOB since his last visit. His CP is midline, substernal, exertional, progressive, and relieved with rest. \par The CP is severe and increasing in severity and duration. His CP is associated with exertional SOB as well.

## 2022-04-14 NOTE — DISCUSSION/SUMMARY
[FreeTextEntry1] : 81 M HTN hyperlipidemia CAD s/p PCI PVD for f/u with worsening CP.\par His symptoms are consistent with progressive angina.\par REFER FOR CARDIAC CATHETERIZATION STAT.\par Continue medications for HTN and hyperlipidemia.\par Continue ASA 81 and plavix 75 for CAD s/p PCI.\par

## 2022-04-18 VITALS
TEMPERATURE: 98 F | RESPIRATION RATE: 18 BRPM | SYSTOLIC BLOOD PRESSURE: 112 MMHG | WEIGHT: 173.06 LBS | DIASTOLIC BLOOD PRESSURE: 62 MMHG | OXYGEN SATURATION: 94 % | HEIGHT: 67.32 IN | HEART RATE: 72 BPM

## 2022-04-18 RX ORDER — CHLORHEXIDINE GLUCONATE 213 G/1000ML
1 SOLUTION TOPICAL ONCE
Refills: 0 | Status: DISCONTINUED | OUTPATIENT
Start: 2022-04-22 | End: 2022-04-23

## 2022-04-18 NOTE — H&P ADULT - HISTORY OF PRESENT ILLNESS
Cardiologist: Dr. Marie  Escort: Karen service   Pharmacy: [facility]  COVID: 4/19 @ [facility]    *Verify Meds*    82 yo Belarusian Speaking M, former smoker, with a PMH of HTN, HLD, CAD s/p KELLY to p/mLAD, OM2 with brachytherapy, DM, remote DVT, PAD s/p KELLY (L) and (R) dSFA who presented to outpatient cardiologist Dr. Marie with complaints of worsening midline substernal exertional CP relieved with rest. CP is a/w SOB. Denies dizziness, diaphoresis, palpitations, orthopnea/PND, BRBPR, hematuria, melena, LE swelling, recent travel/sick contacts/illness. In light of patient's risk factors, CCS angina class III sx and known CAD, patient is referred for cardiac catheterization with possible intervention if clinically indicated.     LHC with b/l LE runoff 9/14/20: diagnostic LHC. LM normal, patent p-mLAd stent, IVUS of pLAD negative. OM2 stent patent, RCA nondominant /small. LVEF 50%, LVEDP 5mmHg, No AS/MR R radial access. B/l LE runoff with mild disease in b/l ileus. Patent L and R dSFA stent. Moderate b/l AT disease. 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 Cardiologist: Dr. Marie  Escort: Ride service   Pharmacy: [facility]  COVID: 4/19 @ [place] (negative in [facility])    Medications verified by pt's detailed list and cross-referenced from patients report (surescripts not populating)    80 yo Italian Speaking M, former smoker, with a PMH of HTN, HLD, CAD s/p KELLY to p/mLAD, OM2 with brachytherapy, DM, remote DVT, PAD s/p KELLY (L) and (R) dSFA who presented to outpatient cardiologist Dr. Marie with complaints of worsening midline substernal exertional CP relieved with rest. CP is a/w SOB. Also endorses B/L LE claudication at rest at night & upon ambulation of <1/2 city blocks over past couple years but progressively worsening over past >6 months. Denies dizziness, diaphoresis, palpitations, orthopnea/PND, BRBPR, hematuria, melena, LE swelling, recent travel/sick contacts/illness, non-healing wounds, change in LE color/hair pattern/temp. In light of patient's risk factors, CCS angina class III sx and known CAD, patient is referred for cardiac catheterization with possible intervention if clinically indicated.     LHC with b/l LE runoff 9/14/20: diagnostic LHC. LM normal, patent p-mLAd stent, IVUS of pLAD negative. OM2 stent patent, RCA nondominant /small. LVEF 50%, LVEDP 5mmHg, No AS/MR R radial access. B/l LE runoff with mild disease in b/l ileus. Patent L and R dSFA stent. Moderate b/l AT disease. 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 Cardiologist: Dr. Marie  Escort: Ride service   Pharmacy: [facility]  COVID: 4/19 @ [place] (negative in [facility])    Medications verified by pt's detailed list and cross-referenced from patients report (surescripts not populating)    82 yo Armenian Speaking M, former smoker, with a PMH of HTN, HLD, CAD s/p KELLY to p/mLAD, OM2 with brachytherapy, DM, remote DVT, PAD s/p KELLY (L) and (R) dSFA who presented to outpatient cardiologist Dr. Marie with complaints of worsening midline substernal exertional CP relieved with rest. CP is a/w SOB. Also endorses B/L LE claudication at rest at night & upon ambulation of <1/2 city blocks over past couple years but progressively worsening over past >6 months. Denies dizziness, diaphoresis, palpitations, orthopnea/PND, BRBPR, hematuria, melena, LE swelling, recent travel/sick contacts/illness, non-healing wounds, change in LE color/hair pattern/temp. In light of patient's risk factors, CCS angina class III sx and known CAD, patient is referred for cardiac catheterization with possible intervention if clinically indicated.     LHC with b/l LE runoff 9/14/20: diagnostic LHC. LM normal, patent p-mLAd stent, IVUS of pLAD negative. OM2 stent patent, RCA nondominant /small. LVEF 50%, LVEDP 5mmHg, No AS/MR R radial access. B/l LE runoff with mild disease in b/l ileus. Patent L and R dSFA stent. Moderate b/l AT disease. 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

## 2022-04-18 NOTE — H&P ADULT - NSHPLABSRESULTS_GEN_ALL_CORE
13.1   5.29  )-----------( 144      ( 22 Apr 2022 08:32 )             38.2       04-22    139  |  103  |  20  ----------------------------<  175<H>  4.1   |  25  |  0.88    Ca    9.2      22 Apr 2022 08:32    TPro  7.7  /  Alb  4.8  /  TBili  0.3  /  DBili  x   /  AST  21  /  ALT  25  /  AlkPhos  67  04-22      PT/INR - ( 22 Apr 2022 08:33 )   PT: 11.5 sec;   INR: 0.97          PTT - ( 22 Apr 2022 08:33 )  PTT:31.7 sec    CARDIAC MARKERS ( 22 Apr 2022 08:32 )  x     / x     / 190 U/L / x     / 3.0 ng/mL            EKG: 13.1   5.29  )-----------( 144      ( 22 Apr 2022 08:32 )             38.2       04-22    139  |  103  |  20  ----------------------------<  175<H>  4.1   |  25  |  0.88    Ca    9.2      22 Apr 2022 08:32    TPro  7.7  /  Alb  4.8  /  TBili  0.3  /  DBili  x   /  AST  21  /  ALT  25  /  AlkPhos  67  04-22      PT/INR - ( 22 Apr 2022 08:33 )   PT: 11.5 sec;   INR: 0.97          PTT - ( 22 Apr 2022 08:33 )  PTT:31.7 sec    CARDIAC MARKERS ( 22 Apr 2022 08:32 )    EKG 4/22/22: NSR @ 69 BPM with LAD/LAFB.  x     / x     / 190 U/L / x     / 3.0 ng/mL            EKG:

## 2022-04-18 NOTE — H&P ADULT - ASSESSMENT
82 yo Turkish Speaking M, former smoker, with a PMH of HTN, HLD, CAD s/p KELLY to p/mLAD, OM2 with brachytherapy, DM, remote DVT, PAD s/p KELLY (L) and (R) dSFA who presented to outpatient cardiologist Dr. Marie with complaints of worsening midline substernal exertional CP relieved with rest. CP is a/w SOB. In light of patient's risk factors, CCS angina class III sx and known CAD, patient is referred for cardiac catheterization with possible intervention if clinically indicated.     ASA Class III    Mallampati Class III    Risks & benefits of procedure and alternative therapy have been explained to the patient including but not limited to: allergic reaction, bleeding with possible need for blood transfusion, infection, renal and vascular compromise, limb damage, arrhythmia, stroke, vessel dissection/perforation, Myocardial infarction, emergent CABG. Informed consent obtained and in chart.       Patient a candidate for sedation: Yes    Sedation Type: Moderate   80 yo Luxembourgish Speaking M, former smoker, with a PMH of HTN, HLD, CAD s/p KELLY to p/mLAD, OM2 with brachytherapy, DM, remote DVT, PAD s/p KELLY (L) and (R) dSFA who presented to outpatient cardiologist Dr. Marie with complaints of worsening midline substernal exertional CP relieved with rest. CP is a/w SOB. In light of patient's risk factors, CCS angina class III sx and known CAD, patient is referred for cardiac catheterization with possible intervention if clinically indicated.     ASA Class III    Mallampati Class III    IV  cc bolus x1 followed by NS 75 cc/hr hr pre-cath fluids per hydration protocol.     Took ASA 81 mg & Plavix 75 mg last on 4/21 and endorses daily compliance. Will provide Ecotrin 81 mg PO x1 & Plavix 75 mg PO x1 pre-cath.     Risks & benefits of procedure and alternative therapy have been explained to the patient including but not limited to: allergic reaction, bleeding with possible need for blood transfusion, infection, renal and vascular compromise, limb damage, arrhythmia, stroke, vessel dissection/perforation, Myocardial infarction, emergent CABG. Informed consent obtained and in chart.       Patient a candidate for sedation: Yes    Sedation Type: Moderate   80 yo Sami Speaking M, former smoker, with a PMH of HTN, HLD, CAD s/p KELLY to p/mLAD, OM2 with brachytherapy, DM, remote DVT, PAD s/p KELLY (L) and (R) dSFA who presented to outpatient cardiologist Dr. Marie with complaints of worsening midline substernal exertional CP relieved with rest. CP is a/w SOB. In light of patient's risk factors, CCS angina class III sx and known CAD, patient is referred for cardiac catheterization with possible intervention if clinically indicated.     ASA Class III    Mallampati Class III    IV  cc bolus x1 followed by NS 75 cc/hr hr pre-cath fluids per hydration protocol.     Took ASA 81 mg & Plavix 75 mg last on 4/21 and endorses daily compliance. Will provide Ecotrin 81 mg PO x1 & Plavix 75 mg PO x1 pre-cath.     Consent obtained with assistance of Sami language line Jigna #[id number].    Risks & benefits of procedure and alternative therapy have been explained to the patient including but not limited to: allergic reaction, bleeding with possible need for blood transfusion, infection, renal and vascular compromise, limb damage, arrhythmia, stroke, vessel dissection/perforation, Myocardial infarction, emergent CABG. Informed consent obtained and in chart.       Patient a candidate for sedation: Yes    Sedation Type: Moderate   80 yo Sami Speaking M, former smoker, with a PMH of HTN, HLD, CAD s/p KLELY to p/mLAD, OM2 with brachytherapy, DM, remote DVT, PAD s/p KELLY (L) and (R) dSFA who presented to outpatient cardiologist Dr. Marie with complaints of worsening midline substernal exertional CP relieved with rest. CP is a/w SOB. In light of patient's risk factors, CCS angina class III sx and known CAD, patient is referred for cardiac catheterization with possible intervention if clinically indicated.     ASA Class III    Mallampati Class III    IV  cc bolus x1 followed by NS 75 cc/hr hr pre-cath fluids per hydration protocol.     Took ASA 81 mg & Plavix 75 mg last on 4/21 and endorses daily compliance. Will provide Ecotrin 81 mg PO x1 & Plavix 75 mg PO x1 pre-cath.     Outpatient f/u evaluation of LE claudication as d/w Dr. Xavier,    Consent obtained with assistance of Sami language line Jigna #[id number].    Risks & benefits of procedure and alternative therapy have been explained to the patient including but not limited to: allergic reaction, bleeding with possible need for blood transfusion, infection, renal and vascular compromise, limb damage, arrhythmia, stroke, vessel dissection/perforation, Myocardial infarction, emergent CABG. Informed consent obtained and in chart.       Patient a candidate for sedation: Yes    Sedation Type: Moderate

## 2022-04-22 ENCOUNTER — INPATIENT (INPATIENT)
Facility: HOSPITAL | Age: 82
LOS: 0 days | Discharge: HOME | DRG: 251 | End: 2022-04-23
Attending: INTERNAL MEDICINE | Admitting: INTERNAL MEDICINE
Payer: MEDICARE

## 2022-04-22 ENCOUNTER — TRANSCRIPTION ENCOUNTER (OUTPATIENT)
Age: 82
End: 2022-04-22

## 2022-04-22 DIAGNOSIS — Z98.89 OTHER SPECIFIED POSTPROCEDURAL STATES: Chronic | ICD-10-CM

## 2022-04-22 DIAGNOSIS — Z98.890 OTHER SPECIFIED POSTPROCEDURAL STATES: Chronic | ICD-10-CM

## 2022-04-22 DIAGNOSIS — Z98.61 CORONARY ANGIOPLASTY STATUS: Chronic | ICD-10-CM

## 2022-04-22 LAB
A1C WITH ESTIMATED AVERAGE GLUCOSE RESULT: 7.5 % — HIGH (ref 4–5.6)
ALBUMIN SERPL ELPH-MCNC: 4.8 G/DL — SIGNIFICANT CHANGE UP (ref 3.3–5)
ALP SERPL-CCNC: 67 U/L — SIGNIFICANT CHANGE UP (ref 40–120)
ALT FLD-CCNC: 25 U/L — SIGNIFICANT CHANGE UP (ref 10–45)
ANION GAP SERPL CALC-SCNC: 11 MMOL/L — SIGNIFICANT CHANGE UP (ref 5–17)
APTT BLD: 31.7 SEC — SIGNIFICANT CHANGE UP (ref 27.5–35.5)
AST SERPL-CCNC: 21 U/L — SIGNIFICANT CHANGE UP (ref 10–40)
BASOPHILS # BLD AUTO: 0.05 K/UL — SIGNIFICANT CHANGE UP (ref 0–0.2)
BASOPHILS NFR BLD AUTO: 0.9 % — SIGNIFICANT CHANGE UP (ref 0–2)
BILIRUB SERPL-MCNC: 0.3 MG/DL — SIGNIFICANT CHANGE UP (ref 0.2–1.2)
BUN SERPL-MCNC: 20 MG/DL — SIGNIFICANT CHANGE UP (ref 7–23)
CALCIUM SERPL-MCNC: 9.2 MG/DL — SIGNIFICANT CHANGE UP (ref 8.4–10.5)
CHLORIDE SERPL-SCNC: 103 MMOL/L — SIGNIFICANT CHANGE UP (ref 96–108)
CHOLEST SERPL-MCNC: 138 MG/DL — SIGNIFICANT CHANGE UP
CK MB CFR SERPL CALC: 3 NG/ML — SIGNIFICANT CHANGE UP (ref 0–6.7)
CK SERPL-CCNC: 190 U/L — SIGNIFICANT CHANGE UP (ref 30–200)
CO2 SERPL-SCNC: 25 MMOL/L — SIGNIFICANT CHANGE UP (ref 22–31)
CREAT SERPL-MCNC: 0.88 MG/DL — SIGNIFICANT CHANGE UP (ref 0.5–1.3)
EGFR: 87 ML/MIN/1.73M2 — SIGNIFICANT CHANGE UP
EOSINOPHIL # BLD AUTO: 0.05 K/UL — SIGNIFICANT CHANGE UP (ref 0–0.5)
EOSINOPHIL NFR BLD AUTO: 0.9 % — SIGNIFICANT CHANGE UP (ref 0–6)
ESTIMATED AVERAGE GLUCOSE: 169 MG/DL — HIGH (ref 68–114)
GLUCOSE BLDC GLUCOMTR-MCNC: 157 MG/DL — HIGH (ref 70–99)
GLUCOSE BLDC GLUCOMTR-MCNC: 189 MG/DL — HIGH (ref 70–99)
GLUCOSE BLDC GLUCOMTR-MCNC: 99 MG/DL — SIGNIFICANT CHANGE UP (ref 70–99)
GLUCOSE SERPL-MCNC: 175 MG/DL — HIGH (ref 70–99)
HCT VFR BLD CALC: 38.2 % — LOW (ref 39–50)
HDLC SERPL-MCNC: 50 MG/DL — SIGNIFICANT CHANGE UP
HGB BLD-MCNC: 13.1 G/DL — SIGNIFICANT CHANGE UP (ref 13–17)
IMM GRANULOCYTES NFR BLD AUTO: 0.4 % — SIGNIFICANT CHANGE UP (ref 0–1.5)
INR BLD: 0.97 — SIGNIFICANT CHANGE UP (ref 0.88–1.16)
LIPID PNL WITH DIRECT LDL SERPL: 40 MG/DL — SIGNIFICANT CHANGE UP
LYMPHOCYTES # BLD AUTO: 1.63 K/UL — SIGNIFICANT CHANGE UP (ref 1–3.3)
LYMPHOCYTES # BLD AUTO: 30.8 % — SIGNIFICANT CHANGE UP (ref 13–44)
MCHC RBC-ENTMCNC: 31.1 PG — SIGNIFICANT CHANGE UP (ref 27–34)
MCHC RBC-ENTMCNC: 34.3 GM/DL — SIGNIFICANT CHANGE UP (ref 32–36)
MCV RBC AUTO: 90.7 FL — SIGNIFICANT CHANGE UP (ref 80–100)
MONOCYTES # BLD AUTO: 0.35 K/UL — SIGNIFICANT CHANGE UP (ref 0–0.9)
MONOCYTES NFR BLD AUTO: 6.6 % — SIGNIFICANT CHANGE UP (ref 2–14)
NEUTROPHILS # BLD AUTO: 3.19 K/UL — SIGNIFICANT CHANGE UP (ref 1.8–7.4)
NEUTROPHILS NFR BLD AUTO: 60.4 % — SIGNIFICANT CHANGE UP (ref 43–77)
NON HDL CHOLESTEROL: 88 MG/DL — SIGNIFICANT CHANGE UP
NRBC # BLD: 0 /100 WBCS — SIGNIFICANT CHANGE UP (ref 0–0)
PLATELET # BLD AUTO: 144 K/UL — LOW (ref 150–400)
POTASSIUM SERPL-MCNC: 4.1 MMOL/L — SIGNIFICANT CHANGE UP (ref 3.5–5.3)
POTASSIUM SERPL-SCNC: 4.1 MMOL/L — SIGNIFICANT CHANGE UP (ref 3.5–5.3)
PROT SERPL-MCNC: 7.7 G/DL — SIGNIFICANT CHANGE UP (ref 6–8.3)
PROTHROM AB SERPL-ACNC: 11.5 SEC — SIGNIFICANT CHANGE UP (ref 10.5–13.4)
RBC # BLD: 4.21 M/UL — SIGNIFICANT CHANGE UP (ref 4.2–5.8)
RBC # FLD: 12.6 % — SIGNIFICANT CHANGE UP (ref 10.3–14.5)
SODIUM SERPL-SCNC: 139 MMOL/L — SIGNIFICANT CHANGE UP (ref 135–145)
TRIGL SERPL-MCNC: 242 MG/DL — HIGH
WBC # BLD: 5.29 K/UL — SIGNIFICANT CHANGE UP (ref 3.8–10.5)
WBC # FLD AUTO: 5.29 K/UL — SIGNIFICANT CHANGE UP (ref 3.8–10.5)

## 2022-04-22 PROCEDURE — 93458 L HRT ARTERY/VENTRICLE ANGIO: CPT | Mod: 26,XU

## 2022-04-22 PROCEDURE — 92978 ENDOLUMINL IVUS OCT C 1ST: CPT | Mod: 26,LC

## 2022-04-22 PROCEDURE — 75716 ARTERY X-RAYS ARMS/LEGS: CPT | Mod: 26,XU

## 2022-04-22 PROCEDURE — 92920 PRQ TRLUML C ANGIOP 1ART&/BR: CPT | Mod: LC

## 2022-04-22 RX ORDER — ATENOLOL 25 MG/1
25 TABLET ORAL DAILY
Refills: 0 | Status: DISCONTINUED | OUTPATIENT
Start: 2022-04-23 | End: 2022-04-23

## 2022-04-22 RX ORDER — DEXTROSE 50 % IN WATER 50 %
12.5 SYRINGE (ML) INTRAVENOUS ONCE
Refills: 0 | Status: DISCONTINUED | OUTPATIENT
Start: 2022-04-22 | End: 2022-04-23

## 2022-04-22 RX ORDER — ASPIRIN/CALCIUM CARB/MAGNESIUM 324 MG
81 TABLET ORAL ONCE
Refills: 0 | Status: COMPLETED | OUTPATIENT
Start: 2022-04-22 | End: 2022-04-22

## 2022-04-22 RX ORDER — LOSARTAN POTASSIUM 100 MG/1
50 TABLET, FILM COATED ORAL
Refills: 0 | Status: DISCONTINUED | OUTPATIENT
Start: 2022-04-23 | End: 2022-04-23

## 2022-04-22 RX ORDER — CLOPIDOGREL BISULFATE 75 MG/1
75 TABLET, FILM COATED ORAL DAILY
Refills: 0 | Status: DISCONTINUED | OUTPATIENT
Start: 2022-04-23 | End: 2022-04-23

## 2022-04-22 RX ORDER — KETOTIFEN FUMARATE 0.34 MG/ML
1 SOLUTION OPHTHALMIC DAILY
Refills: 0 | Status: DISCONTINUED | OUTPATIENT
Start: 2022-04-22 | End: 2022-04-23

## 2022-04-22 RX ORDER — ASPIRIN/CALCIUM CARB/MAGNESIUM 324 MG
81 TABLET ORAL DAILY
Refills: 0 | Status: DISCONTINUED | OUTPATIENT
Start: 2022-04-23 | End: 2022-04-23

## 2022-04-22 RX ORDER — SODIUM CHLORIDE 9 MG/ML
500 INJECTION INTRAMUSCULAR; INTRAVENOUS; SUBCUTANEOUS
Refills: 0 | Status: DISCONTINUED | OUTPATIENT
Start: 2022-04-22 | End: 2022-04-23

## 2022-04-22 RX ORDER — GLUCAGON INJECTION, SOLUTION 0.5 MG/.1ML
1 INJECTION, SOLUTION SUBCUTANEOUS ONCE
Refills: 0 | Status: DISCONTINUED | OUTPATIENT
Start: 2022-04-22 | End: 2022-04-23

## 2022-04-22 RX ORDER — CLOPIDOGREL BISULFATE 75 MG/1
75 TABLET, FILM COATED ORAL ONCE
Refills: 0 | Status: COMPLETED | OUTPATIENT
Start: 2022-04-22 | End: 2022-04-22

## 2022-04-22 RX ORDER — TAMSULOSIN HYDROCHLORIDE 0.4 MG/1
0.4 CAPSULE ORAL AT BEDTIME
Refills: 0 | Status: DISCONTINUED | OUTPATIENT
Start: 2022-04-22 | End: 2022-04-23

## 2022-04-22 RX ORDER — SODIUM CHLORIDE 9 MG/ML
1000 INJECTION, SOLUTION INTRAVENOUS
Refills: 0 | Status: DISCONTINUED | OUTPATIENT
Start: 2022-04-22 | End: 2022-04-23

## 2022-04-22 RX ORDER — LORATADINE 10 MG/1
10 TABLET ORAL DAILY
Refills: 0 | Status: DISCONTINUED | OUTPATIENT
Start: 2022-04-22 | End: 2022-04-23

## 2022-04-22 RX ORDER — MULTIVIT-MIN/FERROUS GLUCONATE 9 MG/15 ML
1 LIQUID (ML) ORAL DAILY
Refills: 0 | Status: DISCONTINUED | OUTPATIENT
Start: 2022-04-22 | End: 2022-04-23

## 2022-04-22 RX ORDER — ATORVASTATIN CALCIUM 80 MG/1
40 TABLET, FILM COATED ORAL AT BEDTIME
Refills: 0 | Status: DISCONTINUED | OUTPATIENT
Start: 2022-04-22 | End: 2022-04-23

## 2022-04-22 RX ORDER — PANTOPRAZOLE SODIUM 20 MG/1
40 TABLET, DELAYED RELEASE ORAL
Refills: 0 | Status: DISCONTINUED | OUTPATIENT
Start: 2022-04-22 | End: 2022-04-23

## 2022-04-22 RX ORDER — DEXLANSOPRAZOLE 30 MG/1
1 CAPSULE, DELAYED RELEASE ORAL
Qty: 0 | Refills: 0 | DISCHARGE

## 2022-04-22 RX ORDER — DEXTROSE 50 % IN WATER 50 %
15 SYRINGE (ML) INTRAVENOUS ONCE
Refills: 0 | Status: DISCONTINUED | OUTPATIENT
Start: 2022-04-22 | End: 2022-04-23

## 2022-04-22 RX ORDER — DEXTROSE 50 % IN WATER 50 %
25 SYRINGE (ML) INTRAVENOUS ONCE
Refills: 0 | Status: DISCONTINUED | OUTPATIENT
Start: 2022-04-22 | End: 2022-04-23

## 2022-04-22 RX ORDER — LIPASE/PROTEASE/AMYLASE 16-48-48K
1 CAPSULE,DELAYED RELEASE (ENTERIC COATED) ORAL
Refills: 0 | Status: DISCONTINUED | OUTPATIENT
Start: 2022-04-22 | End: 2022-04-23

## 2022-04-22 RX ORDER — CILOSTAZOL 100 MG/1
100 TABLET ORAL
Refills: 0 | Status: DISCONTINUED | OUTPATIENT
Start: 2022-04-22 | End: 2022-04-23

## 2022-04-22 RX ORDER — SODIUM CHLORIDE 9 MG/ML
250 INJECTION INTRAMUSCULAR; INTRAVENOUS; SUBCUTANEOUS ONCE
Refills: 0 | Status: COMPLETED | OUTPATIENT
Start: 2022-04-22 | End: 2022-04-22

## 2022-04-22 RX ORDER — INSULIN LISPRO 100/ML
VIAL (ML) SUBCUTANEOUS
Refills: 0 | Status: DISCONTINUED | OUTPATIENT
Start: 2022-04-22 | End: 2022-04-23

## 2022-04-22 RX ADMIN — Medication 1: at 22:23

## 2022-04-22 RX ADMIN — TAMSULOSIN HYDROCHLORIDE 0.4 MILLIGRAM(S): 0.4 CAPSULE ORAL at 22:20

## 2022-04-22 RX ADMIN — SODIUM CHLORIDE 235 MILLILITER(S): 9 INJECTION INTRAMUSCULAR; INTRAVENOUS; SUBCUTANEOUS at 14:48

## 2022-04-22 RX ADMIN — SODIUM CHLORIDE 75 MILLILITER(S): 9 INJECTION INTRAMUSCULAR; INTRAVENOUS; SUBCUTANEOUS at 09:19

## 2022-04-22 RX ADMIN — Medication 1: at 18:04

## 2022-04-22 RX ADMIN — ATORVASTATIN CALCIUM 40 MILLIGRAM(S): 80 TABLET, FILM COATED ORAL at 22:19

## 2022-04-22 RX ADMIN — SODIUM CHLORIDE 500 MILLILITER(S): 9 INJECTION INTRAMUSCULAR; INTRAVENOUS; SUBCUTANEOUS at 09:20

## 2022-04-22 RX ADMIN — Medication 1 CAPSULE(S): at 18:48

## 2022-04-22 RX ADMIN — CLOPIDOGREL BISULFATE 75 MILLIGRAM(S): 75 TABLET, FILM COATED ORAL at 09:18

## 2022-04-22 RX ADMIN — KETOTIFEN FUMARATE 1 DROP(S): 0.34 SOLUTION OPHTHALMIC at 18:47

## 2022-04-22 RX ADMIN — CILOSTAZOL 100 MILLIGRAM(S): 100 TABLET ORAL at 18:49

## 2022-04-22 RX ADMIN — Medication 81 MILLIGRAM(S): at 09:18

## 2022-04-22 NOTE — DISCHARGE NOTE PROVIDER - HOSPITAL COURSE
INCOMPLETE     80 y/o Khmer speaking male, former smoker, w/ PMHx HTN, HLD, CAD (s/p prior KELLY proximal/midLAD and OM2 w/ brachytherapy), diabetes mellitus, PAD (s/p prior left and right SFA intervention), and remote history of DVT who presented to outpatient cardiologist, Dr. Marie, endorsing worsening midline/substernal, exertional chest pain relieved w/ rest and associated w/ SOB. Patient also endorsed bilateral LE claudication when resting at night and w/ exertion of less than half a city block over the past couple years, but states it has been progressively worsening for the last 6 months. Patient denied dizziness, syncope, palpitations, orthopnea/PND, BRBPR, melena, LE edema, abdominal pain, nausea/vomiting, fever, chills, cough, non-healing wounds, or change in LE appearance. In light of patient’s risk factors, CCS Class III anginal symptoms, and prior significant CAD, patient presented for cardiac catheterization w/ possible intervention if clinically indicated. Patient is now s/p cardiac catheterization w/ PTCA OM1 (90%, ISR), other findings of mid LAD 30-50%, Ramus patent stent, proximal LAD patent stent, distal LAD patent stent, distal LCx patent stent, EDP 5 mmHg, no AS/MR. Runoff was also attempted and showed right iliac disease, patent SFA stents, but unsuccessful runoff below the knee and patient will now follow up as an outpatient for PAD evaluation. Right groin access used and Angioseal closure device was used.     Patient was seen and examined at bedside on 04/23/2022 AM and ______. Right radial and right groin access sites remained stable. Labs were reviewed and remained stable. No significant events were noted overnight on telemetry and repeat EKG was w/o acute ischemic changes. Patient has now been medically cleared for discharge as per  __________. Patient was given appropriate discharge instructions including medication regimen, access site management, and follow up. Any prescriptions the patient requires refills on have been e-prescribed to patient’s preferred pharmacy.     VS Stable  Gen: NAD, A&O x3  Cards: RRR, clear S1 and S2 without murmur  Pulm: CTA B/L without w/r/r  Right Groin: No hematoma or ooze, peripheral pulses 2+ B/L  Abd: soft, NT  Ext: no LE edema or ulcerations B/L   80 y/o Swedish speaking male, former smoker, w/ PMHx HTN, HLD, CAD (s/p prior KELLY proximal/midLAD and OM2 w/ brachytherapy), diabetes mellitus, PAD (s/p prior left and right SFA intervention), and remote history of DVT who presented to outpatient cardiologist, Dr. Marie, endorsing worsening midline/substernal, exertional chest pain relieved w/ rest and associated w/ SOB. Patient also endorsed bilateral LE claudication when resting at night and w/ exertion of less than half a city block over the past couple years, but states it has been progressively worsening for the last 6 months. Patient denied dizziness, syncope, palpitations, orthopnea/PND, BRBPR, melena, LE edema, abdominal pain, nausea/vomiting, fever, chills, cough, non-healing wounds, or change in LE appearance. In light of patient’s risk factors, CCS Class III anginal symptoms, and prior significant CAD, patient presented for cardiac catheterization w/ possible intervention if clinically indicated. Patient is now s/p cardiac catheterization w/ PTCA OM1 (90%, ISR), other findings of mid LAD 30-50%, Ramus patent stent, proximal LAD patent stent, distal LAD patent stent, distal LCx patent stent, EDP 5 mmHg, no AS/MR. Runoff was also attempted and showed right iliac disease, patent SFA stents, but unsuccessful runoff below the knee and patient will now follow up as an outpatient for PAD evaluation. Right groin access used and Angioseal closure device was used.     Patient was seen and examined at bedside on 04/23/2022 AM and denied any complaints. Right radial and right groin access sites remained stable. Labs were reviewed and remained stable. No significant events were noted overnight on telemetry and repeat EKG was w/o acute ischemic changes. Patient has now been medically cleared for discharge as per Dr. Ambrocio. Patient was given appropriate discharge instructions including medication regimen, access site management, and follow up. Any prescriptions the patient requires refills on have been e-prescribed to patient’s preferred pharmacy.     VS Stable  Gen: NAD, A&O x3  Cards: RRR, clear S1 and S2 without murmur  Pulm: CTA B/L without w/r/r  Right Groin: No hematoma or ooze, peripheral pulses 2+ B/L  Abd: soft, NT  Ext: no LE edema or ulcerations B/L    Cardiac Rehab (Post PCI):            - Education on benefits of Cardiac Rehab provided to patient: Yes         - Referral and Prescription Given for Cardiac Rehab: Yes         - Patient given list of locations & instructed to contact their insurance company to review list of participating providers    Statin Prescribed (PCI this admission): Yes  DAPT: Prescriptions for Aspirin/Plavix e-prescribed to preferred pharmacy.

## 2022-04-22 NOTE — PATIENT PROFILE ADULT - FALL HARM RISK - HARM RISK INTERVENTIONS
Assistance with ambulation/Assistance OOB with selected safe patient handling equipment/Communicate Risk of Fall with Harm to all staff/Discuss with provider need for PT consult/Monitor gait and stability/Provide patient with walking aids - walker, cane, crutches/Reinforce activity limits and safety measures with patient and family/Sit up slowly, dangle for a short time, stand at bedside before walking/Tailored Fall Risk Interventions/Use of alarms - bed, chair and/or voice tab/Visual Cue: Yellow wristband and red socks/Bed in lowest position, wheels locked, appropriate side rails in place/Call bell, personal items and telephone in reach/Instruct patient to call for assistance before getting out of bed or chair/Non-slip footwear when patient is out of bed/[place] to call system/Physically safe environment - no spills, clutter or unnecessary equipment/Purposeful Proactive Rounding/Room/bathroom lighting operational, light cord in reach

## 2022-04-22 NOTE — DISCHARGE NOTE PROVIDER - CARE PROVIDER_API CALL
Gabriel Marie)  Cardiovascular Disease; Internal Medicine  [address], [address] [place] [address]  Phone: [phone]  Fax: [phone]  Follow Up Time: 2 weeks

## 2022-04-22 NOTE — DISCHARGE NOTE PROVIDER - NSDCCPCAREPLAN_GEN_ALL_CORE_FT
PRINCIPAL DISCHARGE DIAGNOSIS  Diagnosis: CAD (coronary artery disease)  Assessment and Plan of Treatment: You have a diagnosis of coronary artery disease and received ballooning to your first obtuse marginal coronary artery. You have been continued on Aspirin 81mg daily and Plavix (Clopidogrel) 75mg daily. You MUST continue taking the daily Aspirin and Plavix to ensure your stent does not close. DO NOT STOP THESE MEDICATIONS FOR ANY REASON UNLESS OTHERWISE INDICATED BY YOUR CARDIOLOGIST BECAUSE THIS WILL PUT YOU AT RISK FOR A HEART ATTACK. You should refrain from strenuous activity and heavy lifting for 1 week. Please make a follow up appointment with your cardiologist within 1-2 weeks of your discharge. All of your prescriptions have been sent electronically to your pharmacy.  The catheter from your groin was removed and bleeding was stopped with a closure device.  After 24hours you may take off the dressing and shower. Wash the site with soap and water.  There is no need to put on another bandage.  Avoid tub baths, hot tubs or swimming for 5 days.      Call the Interventional Cardiology and Vascular Team at [phone] or [phone] if any of following occur pertaining to your vascular access site: Bleeding or hematoma formation (collection of blood under the skin), drainage or redness at the puncture site, numbness, decrease in strength, coolness or pale coloration of skin of the leg or hand.       PRINCIPAL DISCHARGE DIAGNOSIS  Diagnosis: CAD (coronary artery disease)  Assessment and Plan of Treatment: You have a diagnosis of coronary artery disease and received ballooning to your first obtuse marginal coronary artery. You have been continued on Aspirin 81mg daily and Plavix (Clopidogrel) 75mg daily. You MUST continue taking the daily Aspirin and Plavix to ensure your stent does not close. DO NOT STOP THESE MEDICATIONS FOR ANY REASON UNLESS OTHERWISE INDICATED BY YOUR CARDIOLOGIST BECAUSE THIS WILL PUT YOU AT RISK FOR A HEART ATTACK. You should refrain from strenuous activity and heavy lifting for 1 week. Please make a follow up appointment with your cardiologist within 1-2 weeks of your discharge. All of your prescriptions have been sent electronically to your pharmacy.  The catheter from your groin was removed and bleeding was stopped with a closure device.  After 24hours you may take off the dressing and shower. Wash the site with soap and water.  There is no need to put on another bandage.  Avoid tub baths, hot tubs or swimming for 5 days.      Call the Interventional Cardiology and Vascular Team at [phone] or [phone] if any of following occur pertaining to your vascular access site: Bleeding or hematoma formation (collection of blood under the skin), drainage or redness at the puncture site, numbness, decrease in strength, coolness or pale coloration of skin of the leg or hand.      SECONDARY DISCHARGE DIAGNOSES  Diagnosis: Hypertension  Assessment and Plan of Treatment: Please continue your home medications of Losartan 50 mg daily and Atenolol 25 mg daily as previously prescribed to you.    Diagnosis: Hyperlipidemia  Assessment and Plan of Treatment: Please continue your home medication of Atorvastatin 40 mg daily and Vascepa 2 g BID as they were previously prescribed.    Diagnosis: Type 2 diabetes mellitus  Assessment and Plan of Treatment: You may continue your home diabetes medications as previously prescribed to you. However, PLEASE DO NOT RESTART JENTADUETO 5 MG-1000 MG DAILY UNTIL MONDAY, 04/25/2022. You may continue the rest of your diabetes medications as early as today, and on Monday you may continue the Jentadueto without restrictions.    Diagnosis: BPH (benign prostatic hyperplasia)  Assessment and Plan of Treatment: Please continue your home medication of Tamsulosin 04. mg daily as previously prescribed.

## 2022-04-22 NOTE — DISCHARGE NOTE PROVIDER - NSDCMRMEDTOKEN_GEN_ALL_CORE_FT
aspirin 81 mg oral delayed release tablet: 1 tab(s) orally once a day with a meal  atenolol 25 mg oral tablet: 1 tab(s) orally once a day in am  atorvastatin 40 mg oral tablet: 1 tab(s) orally once a day (at bedtime)  bisacodyl 5 mg oral delayed release tablet: 1 tab(s) orally once a day, As Needed  cilostazol 100 mg oral tablet: 1 tab(s) orally 2 times a day  clopidogrel 75 mg oral tablet: 1 tab(s) orally once a day  Creon 36,000 units oral delayed release capsule: 1 cap(s) orally 3 times a day  Dexilant 60 mg oral delayed release capsule: 1 cap(s) orally once a day  glipiZIDE 10 mg oral tablet: 1 tab(s) orally once a day  Jardiance 25 mg oral tablet: 1 tab(s) orally once a day (in the morning)  Jentadueto XR 5 mg-1000 mg oral tablet, extended release: 1 tab(s) orally once a day  loratadine 10 mg oral tablet: 1 tab(s) orally once a day  losartan 50 mg oral tablet: 1 tab(s) orally once a day in am  magnesium oxide 400 mg (240 mg elemental magnesium) oral tablet: 1 tab(s) orally once a day  olopatadine 0.2% ophthalmic solution: 1 drop(s) to each affected eye once a day  Restasis 0.05% ophthalmic emulsion: 1 drop(s) to each affected eye every 12 hours, As Needed  tamsulosin 0.4 mg oral capsule: 1 cap(s) orally once a day  Thera-Tabs M oral tablet: 1 tab(s) orally once a day  Vascepa 1 g oral capsule: 2 cap(s) orally 2 times a day  Vitamin D2 1.25 mg (50,000 intl units) oral capsule: 1 cap(s) orally once a week   aspirin 81 mg oral delayed release tablet: 1 tab(s) orally once a day with a meal  atenolol 25 mg oral tablet: 1 tab(s) orally once a day in am  atorvastatin 40 mg oral tablet: 1 tab(s) orally once a day (at bedtime)  bisacodyl 5 mg oral delayed release tablet: 1 tab(s) orally once a day, As Needed  Cardiac Rehab: Patient is referred to cardiac rehab, 3 sessions per week for 12 weeks, due to recent PCI.   cilostazol 100 mg oral tablet: 1 tab(s) orally 2 times a day  clopidogrel 75 mg oral tablet: 1 tab(s) orally once a day  Creon 36,000 units oral delayed release capsule: 1 cap(s) orally 3 times a day  Dexilant 60 mg oral delayed release capsule: 1 cap(s) orally once a day  glipiZIDE 10 mg oral tablet: 1 tab(s) orally once a day  Jardiance 25 mg oral tablet: 1 tab(s) orally once a day (in the morning)  Jentadueto XR 5 mg-1000 mg oral tablet, extended release: 1 tab(s) orally once a day. PLEASE DO NOT RESTART UNTIL 04/25/2022.   loratadine 10 mg oral tablet: 1 tab(s) orally once a day  losartan 50 mg oral tablet: 1 tab(s) orally once a day in am  magnesium oxide 400 mg (240 mg elemental magnesium) oral tablet: 1 tab(s) orally once a day  olopatadine 0.2% ophthalmic solution: 1 drop(s) to each affected eye once a day  Restasis 0.05% ophthalmic emulsion: 1 drop(s) to each affected eye every 12 hours, As Needed  tamsulosin 0.4 mg oral capsule: 1 cap(s) orally once a day  Thera-Tabs M oral tablet: 1 tab(s) orally once a day  Vascepa 1 g oral capsule: 2 cap(s) orally 2 times a day  Vitamin D2 1.25 mg (50,000 intl units) oral capsule: 1 cap(s) orally once a week

## 2022-04-23 ENCOUNTER — TRANSCRIPTION ENCOUNTER (OUTPATIENT)
Age: 82
End: 2022-04-23

## 2022-04-23 VITALS — TEMPERATURE: 97 F

## 2022-04-23 LAB
ANION GAP SERPL CALC-SCNC: 11 MMOL/L — SIGNIFICANT CHANGE UP (ref 5–17)
BASOPHILS # BLD AUTO: 0.03 K/UL — SIGNIFICANT CHANGE UP (ref 0–0.2)
BASOPHILS NFR BLD AUTO: 0.5 % — SIGNIFICANT CHANGE UP (ref 0–2)
BUN SERPL-MCNC: 18 MG/DL — SIGNIFICANT CHANGE UP (ref 7–23)
CALCIUM SERPL-MCNC: 8.8 MG/DL — SIGNIFICANT CHANGE UP (ref 8.4–10.5)
CHLORIDE SERPL-SCNC: 104 MMOL/L — SIGNIFICANT CHANGE UP (ref 96–108)
CO2 SERPL-SCNC: 23 MMOL/L — SIGNIFICANT CHANGE UP (ref 22–31)
CREAT SERPL-MCNC: 0.91 MG/DL — SIGNIFICANT CHANGE UP (ref 0.5–1.3)
EGFR: 85 ML/MIN/1.73M2 — SIGNIFICANT CHANGE UP
EOSINOPHIL # BLD AUTO: 0.05 K/UL — SIGNIFICANT CHANGE UP (ref 0–0.5)
EOSINOPHIL NFR BLD AUTO: 0.9 % — SIGNIFICANT CHANGE UP (ref 0–6)
GLUCOSE BLDC GLUCOMTR-MCNC: 163 MG/DL — HIGH (ref 70–99)
GLUCOSE SERPL-MCNC: 173 MG/DL — HIGH (ref 70–99)
HCT VFR BLD CALC: 37.2 % — LOW (ref 39–50)
HGB BLD-MCNC: 12.3 G/DL — LOW (ref 13–17)
IMM GRANULOCYTES NFR BLD AUTO: 0.2 % — SIGNIFICANT CHANGE UP (ref 0–1.5)
LYMPHOCYTES # BLD AUTO: 1.39 K/UL — SIGNIFICANT CHANGE UP (ref 1–3.3)
LYMPHOCYTES # BLD AUTO: 25.4 % — SIGNIFICANT CHANGE UP (ref 13–44)
MAGNESIUM SERPL-MCNC: 1.9 MG/DL — SIGNIFICANT CHANGE UP (ref 1.6–2.6)
MCHC RBC-ENTMCNC: 30.4 PG — SIGNIFICANT CHANGE UP (ref 27–34)
MCHC RBC-ENTMCNC: 33.1 GM/DL — SIGNIFICANT CHANGE UP (ref 32–36)
MCV RBC AUTO: 92.1 FL — SIGNIFICANT CHANGE UP (ref 80–100)
MONOCYTES # BLD AUTO: 0.37 K/UL — SIGNIFICANT CHANGE UP (ref 0–0.9)
MONOCYTES NFR BLD AUTO: 6.8 % — SIGNIFICANT CHANGE UP (ref 2–14)
NEUTROPHILS # BLD AUTO: 3.62 K/UL — SIGNIFICANT CHANGE UP (ref 1.8–7.4)
NEUTROPHILS NFR BLD AUTO: 66.2 % — SIGNIFICANT CHANGE UP (ref 43–77)
NRBC # BLD: 0 /100 WBCS — SIGNIFICANT CHANGE UP (ref 0–0)
PLATELET # BLD AUTO: 130 K/UL — LOW (ref 150–400)
POTASSIUM SERPL-MCNC: 4.1 MMOL/L — SIGNIFICANT CHANGE UP (ref 3.5–5.3)
POTASSIUM SERPL-SCNC: 4.1 MMOL/L — SIGNIFICANT CHANGE UP (ref 3.5–5.3)
RBC # BLD: 4.04 M/UL — LOW (ref 4.2–5.8)
RBC # FLD: 12.5 % — SIGNIFICANT CHANGE UP (ref 10.3–14.5)
SODIUM SERPL-SCNC: 138 MMOL/L — SIGNIFICANT CHANGE UP (ref 135–145)
WBC # BLD: 5.47 K/UL — SIGNIFICANT CHANGE UP (ref 3.8–10.5)
WBC # FLD AUTO: 5.47 K/UL — SIGNIFICANT CHANGE UP (ref 3.8–10.5)

## 2022-04-23 PROCEDURE — 85730 THROMBOPLASTIN TIME PARTIAL: CPT

## 2022-04-23 PROCEDURE — C1894: CPT

## 2022-04-23 PROCEDURE — 80048 BASIC METABOLIC PNL TOTAL CA: CPT

## 2022-04-23 PROCEDURE — C1887: CPT

## 2022-04-23 PROCEDURE — C1725: CPT

## 2022-04-23 PROCEDURE — C1769: CPT

## 2022-04-23 PROCEDURE — 82550 ASSAY OF CK (CPK): CPT

## 2022-04-23 PROCEDURE — 83036 HEMOGLOBIN GLYCOSYLATED A1C: CPT

## 2022-04-23 PROCEDURE — 85610 PROTHROMBIN TIME: CPT

## 2022-04-23 PROCEDURE — 85025 COMPLETE CBC W/AUTO DIFF WBC: CPT

## 2022-04-23 PROCEDURE — 83735 ASSAY OF MAGNESIUM: CPT

## 2022-04-23 PROCEDURE — 82962 GLUCOSE BLOOD TEST: CPT

## 2022-04-23 PROCEDURE — 82553 CREATINE MB FRACTION: CPT

## 2022-04-23 PROCEDURE — C1753: CPT

## 2022-04-23 PROCEDURE — 80053 COMPREHEN METABOLIC PANEL: CPT

## 2022-04-23 PROCEDURE — 36415 COLL VENOUS BLD VENIPUNCTURE: CPT

## 2022-04-23 PROCEDURE — 99239 HOSP IP/OBS DSCHRG MGMT >30: CPT

## 2022-04-23 PROCEDURE — C1760: CPT

## 2022-04-23 PROCEDURE — 80061 LIPID PANEL: CPT

## 2022-04-23 RX ORDER — CLOPIDOGREL BISULFATE 75 MG/1
1 TABLET, FILM COATED ORAL
Qty: 30 | Refills: 11
Start: 2022-04-23 | End: 2023-04-19

## 2022-04-23 RX ORDER — ASPIRIN/CALCIUM CARB/MAGNESIUM 324 MG
1 TABLET ORAL
Qty: 30 | Refills: 11
Start: 2022-04-23 | End: 2023-04-17

## 2022-04-23 RX ORDER — MAGNESIUM OXIDE 400 MG ORAL TABLET 241.3 MG
400 TABLET ORAL ONCE
Refills: 0 | Status: COMPLETED | OUTPATIENT
Start: 2022-04-23 | End: 2022-04-23

## 2022-04-23 RX ORDER — LINAGLIPTIN AND METFORMIN HYDROCHLORIDE 2.5; 85 MG/1; MG/1
1 TABLET, FILM COATED ORAL
Qty: 0 | Refills: 0 | DISCHARGE

## 2022-04-23 RX ORDER — ASPIRIN/CALCIUM CARB/MAGNESIUM 324 MG
1 TABLET ORAL
Qty: 30 | Refills: 11
Start: 2022-04-23 | End: 2023-04-19

## 2022-04-23 RX ORDER — CLOPIDOGREL BISULFATE 75 MG/1
1 TABLET, FILM COATED ORAL
Qty: 30 | Refills: 11
Start: 2022-04-23 | End: 2023-04-17

## 2022-04-23 RX ADMIN — Medication 81 MILLIGRAM(S): at 11:45

## 2022-04-23 RX ADMIN — KETOTIFEN FUMARATE 1 DROP(S): 0.34 SOLUTION OPHTHALMIC at 12:07

## 2022-04-23 RX ADMIN — MAGNESIUM OXIDE 400 MG ORAL TABLET 400 MILLIGRAM(S): 241.3 TABLET ORAL at 11:40

## 2022-04-23 RX ADMIN — ATENOLOL 25 MILLIGRAM(S): 25 TABLET ORAL at 06:26

## 2022-04-23 RX ADMIN — CLOPIDOGREL BISULFATE 75 MILLIGRAM(S): 75 TABLET, FILM COATED ORAL at 11:45

## 2022-04-23 RX ADMIN — LORATADINE 10 MILLIGRAM(S): 10 TABLET ORAL at 11:47

## 2022-04-23 RX ADMIN — LOSARTAN POTASSIUM 50 MILLIGRAM(S): 100 TABLET, FILM COATED ORAL at 11:45

## 2022-04-23 RX ADMIN — CILOSTAZOL 100 MILLIGRAM(S): 100 TABLET ORAL at 06:26

## 2022-04-23 RX ADMIN — PANTOPRAZOLE SODIUM 40 MILLIGRAM(S): 20 TABLET, DELAYED RELEASE ORAL at 06:26

## 2022-04-23 RX ADMIN — Medication 1 TABLET(S): at 11:48

## 2022-04-23 RX ADMIN — Medication 1 CAPSULE(S): at 09:46

## 2022-04-23 NOTE — DISCHARGE NOTE NURSING/CASE MANAGEMENT/SOCIAL WORK - PATIENT PORTAL LINK FT
You can access the FollowMyHealth Patient Portal offered by [facility] by registering at the following website: [email or web address]. By joining [facility]’s FollowMyHealth portal, you will also be able to view your health information using other applications (apps) compatible with our system.

## 2022-04-23 NOTE — DISCHARGE NOTE NURSING/CASE MANAGEMENT/SOCIAL WORK - NSDCPEFALRISK_GEN_ALL_CORE
For information on Fall & Injury Prevention, visit: [email or web address] OR  [email or web address] OR  https://www.cdc.gov/steadi/patient.html

## 2022-04-25 RX ORDER — LOSARTAN POTASSIUM 100 MG/1
1 TABLET, FILM COATED ORAL
Qty: 0 | Refills: 0 | DISCHARGE

## 2022-04-25 RX ORDER — LINAGLIPTIN AND METFORMIN HYDROCHLORIDE 2.5; 85 MG/1; MG/1
1 TABLET, FILM COATED ORAL
Qty: 0 | Refills: 0 | DISCHARGE
Start: 2022-04-25

## 2022-04-25 RX ORDER — LOSARTAN POTASSIUM 100 MG/1
1 TABLET, FILM COATED ORAL
Qty: 30 | Refills: 2
Start: 2022-04-25 | End: 2022-07-23

## 2022-04-25 RX ORDER — ICOSAPENT ETHYL 500 MG/1
2 CAPSULE, LIQUID FILLED ORAL
Qty: 0 | Refills: 0 | DISCHARGE

## 2022-04-25 RX ORDER — ATENOLOL 25 MG/1
1 TABLET ORAL
Qty: 30 | Refills: 2
Start: 2022-04-25 | End: 2022-07-23

## 2022-04-25 RX ORDER — ATENOLOL 25 MG/1
1 TABLET ORAL
Qty: 0 | Refills: 0 | DISCHARGE

## 2022-04-25 RX ORDER — ICOSAPENT ETHYL 500 MG/1
2 CAPSULE, LIQUID FILLED ORAL
Qty: 120 | Refills: 2
Start: 2022-04-25 | End: 2022-07-23

## 2022-04-29 DIAGNOSIS — Z79.82 LONG TERM (CURRENT) USE OF ASPIRIN: ICD-10-CM

## 2022-04-29 DIAGNOSIS — E11.51 TYPE 2 DIABETES MELLITUS WITH DIABETIC PERIPHERAL ANGIOPATHY WITHOUT GANGRENE: ICD-10-CM

## 2022-04-29 DIAGNOSIS — I25.84 CORONARY ATHEROSCLEROSIS DUE TO CALCIFIED CORONARY LESION: ICD-10-CM

## 2022-04-29 DIAGNOSIS — Z87.891 PERSONAL HISTORY OF NICOTINE DEPENDENCE: ICD-10-CM

## 2022-04-29 DIAGNOSIS — E78.5 HYPERLIPIDEMIA, UNSPECIFIED: ICD-10-CM

## 2022-04-29 DIAGNOSIS — I25.10 ATHEROSCLEROTIC HEART DISEASE OF NATIVE CORONARY ARTERY WITHOUT ANGINA PECTORIS: ICD-10-CM

## 2022-04-29 DIAGNOSIS — Z86.718 PERSONAL HISTORY OF OTHER VENOUS THROMBOSIS AND EMBOLISM: ICD-10-CM

## 2022-04-29 DIAGNOSIS — Z95.5 PRESENCE OF CORONARY ANGIOPLASTY IMPLANT AND GRAFT: ICD-10-CM
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
## 2022-04-29 DIAGNOSIS — Z79.84 LONG TERM (CURRENT) USE OF ORAL HYPOGLYCEMIC DRUGS: ICD-10-CM

## 2022-04-29 DIAGNOSIS — T82.855A STENOSIS OF CORONARY ARTERY STENT, INITIAL ENCOUNTER: ICD-10-CM
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
## 2022-04-29 DIAGNOSIS — Z88.1 ALLERGY STATUS TO OTHER ANTIBIOTIC AGENTS STATUS: ICD-10-CM

## 2022-05-04 ENCOUNTER — APPOINTMENT (OUTPATIENT)
Dept: CARDIOLOGY | Facility: CLINIC | Age: 82
End: 2022-05-04
Payer: MEDICARE

## 2022-05-04 VITALS
BODY MASS INDEX: 28.25 KG/M2 | SYSTOLIC BLOOD PRESSURE: 132 MMHG | WEIGHT: 180 LBS | DIASTOLIC BLOOD PRESSURE: 68 MMHG | HEART RATE: 76 BPM | HEIGHT: 67 IN | RESPIRATION RATE: 15 BRPM

## 2022-05-04 PROCEDURE — 99214 OFFICE O/P EST MOD 30 MIN: CPT

## 2022-05-04 NOTE — REASON FOR VISIT
[Hyperlipidemia] : hyperlipidemia [Hypertension] : hypertension [Coronary Artery Disease] : coronary artery disease [FreeTextEntry1] : 82 M HTN hyperlipidemia PVD CAD s/p PCI for f/u. \par [phone]\par Dr. Ward \par Echo 2020\par

## 2022-05-04 NOTE — HISTORY OF PRESENT ILLNESS
[FreeTextEntry1] :  \par \par 1. HTN: controlled. Compliant with medications.\par 2. Hyperlipidemia: on statin. No muscle pain is noted.\par 3. 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 He underwent repeat cardiac catheterization which showed LCx ISR.He underwent PCI and then underwent brachytherapy in 11/2019. He is on ASA and plavix. \par 4. PVD: patient has deep fem disease on the R on US in 2015. He had PTCA in 7/2019 at a hospital in [place]. \par He had peripheral angiogram that showed mostly below knee disease. He underwent PTCA with Dr. Steele.\par  \par \par He received his booster COVID vaccine. \par \par He underwent PCI of the ISR of the OM1. His CP has improved. He is on ASA and plavix.

## 2022-05-04 NOTE — DISCUSSION/SUMMARY
[FreeTextEntry1] : 82 M HTN hyperlipidemia CAD s/p PCI PVD for f/u after cardiac catheterization.\par Cath report reviewed.\par Continue ASA and plavix for CAD s/p PCI.\par Continue medications for HTN and hyperlipidemia.\par \par

## 2022-06-02 ENCOUNTER — APPOINTMENT (OUTPATIENT)
Dept: CARDIOLOGY | Facility: CLINIC | Age: 82
End: 2022-06-02
Payer: MEDICARE

## 2022-06-02 VITALS
WEIGHT: 178 LBS | HEART RATE: 61 BPM | BODY MASS INDEX: 27.88 KG/M2 | DIASTOLIC BLOOD PRESSURE: 66 MMHG | SYSTOLIC BLOOD PRESSURE: 126 MMHG | OXYGEN SATURATION: 96 % | TEMPERATURE: 98.2 F | RESPIRATION RATE: 18 BRPM

## 2022-06-02 PROCEDURE — 99214 OFFICE O/P EST MOD 30 MIN: CPT

## 2022-06-02 PROCEDURE — 93000 ELECTROCARDIOGRAM COMPLETE: CPT

## 2022-06-02 NOTE — DISCUSSION/SUMMARY
[FreeTextEntry1] : 82 M HTN hyperlipidemia CAD s/p PCI PVD with worsening claudication.\par CHECK CT CLAUDICATION PROTOCOL.\par Continue ASA and plavix for CAD s/p PCI.\par Continue medications for HTN.\par Continue statin for hyperlipidemia.\par

## 2022-06-02 NOTE — HISTORY OF PRESENT ILLNESS
[FreeTextEntry1] :  \par 1. HTN: controlled. No SE are noted.\par 2. Hyperlipidemia: on statin. No muscle pain is noted.\par 3. 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 He underwent repeat cardiac catheterization which showed LCx ISR.He underwent PCI and then underwent brachytherapy in 11/2019. He is on ASA and plavix. \par 4. PVD: patient has deep fem disease on the R on US in 2015. He had PTCA in 7/2019 at a hospital in [place]. \par He had peripheral angiogram that showed mostly below knee disease. He underwent PTCA with Dr. Steele.\par  \par \par He received his booster COVID vaccine. \par \par He underwent PCI of the ISR of the OM1. His CP has improved. He is on ASA and plavix. \par \par The patient reports worsening claudication (bilaterally, but worse on the L). His symptoms are mostly in the calves and his ET is limited to several blocks and he has to stop frequently (decrease from prior).\par

## 2022-06-02 NOTE — REASON FOR VISIT
[Hyperlipidemia] : hyperlipidemia [Hypertension] : hypertension [Coronary Artery Disease] : coronary artery disease [Carotid, Aortic and Peripheral Vascular Disease] : carotid, aortic and peripheral vascular disease [FreeTextEntry1] : 82 M HTN hyperlipidemia PVD CAD s/p PCI for f/u. \par [phone]\par Dr. Ward \par Echo 2020\par

## 2022-07-14 DIAGNOSIS — Z01.818 ENCOUNTER FOR OTHER PREPROCEDURAL EXAMINATION: ICD-10-CM

## 2022-07-28 ENCOUNTER — TRANSCRIPTION ENCOUNTER (OUTPATIENT)
Age: 82
End: 2022-07-28

## 2022-07-28 ENCOUNTER — INPATIENT (INPATIENT)
Facility: HOSPITAL | Age: 82
LOS: 0 days | Discharge: HOME | End: 2022-07-29
Attending: STUDENT IN AN ORGANIZED HEALTH CARE EDUCATION/TRAINING PROGRAM | Admitting: STUDENT IN AN ORGANIZED HEALTH CARE EDUCATION/TRAINING PROGRAM

## 2022-07-28 VITALS
RESPIRATION RATE: 18 BRPM | OXYGEN SATURATION: 96 % | DIASTOLIC BLOOD PRESSURE: 84 MMHG | SYSTOLIC BLOOD PRESSURE: 158 MMHG | TEMPERATURE: 96 F | HEART RATE: 59 BPM

## 2022-07-28 DIAGNOSIS — I10 ESSENTIAL (PRIMARY) HYPERTENSION: ICD-10-CM

## 2022-07-28 DIAGNOSIS — E78.5 HYPERLIPIDEMIA, UNSPECIFIED: ICD-10-CM

## 2022-07-28 DIAGNOSIS — I25.10 ATHEROSCLEROTIC HEART DISEASE OF NATIVE CORONARY ARTERY WITHOUT ANGINA PECTORIS: ICD-10-CM

## 2022-07-28 DIAGNOSIS — Z98.89 OTHER SPECIFIED POSTPROCEDURAL STATES: Chronic | ICD-10-CM

## 2022-07-28 DIAGNOSIS — E11.9 TYPE 2 DIABETES MELLITUS WITHOUT COMPLICATIONS: ICD-10-CM

## 2022-07-28 DIAGNOSIS — Z98.890 OTHER SPECIFIED POSTPROCEDURAL STATES: Chronic | ICD-10-CM

## 2022-07-28 DIAGNOSIS — Z98.61 CORONARY ANGIOPLASTY STATUS: Chronic | ICD-10-CM

## 2022-07-28 LAB
ANION GAP SERPL CALC-SCNC: 11 MMOL/L — SIGNIFICANT CHANGE UP (ref 7–14)
BUN SERPL-MCNC: 22 MG/DL — HIGH (ref 10–20)
CALCIUM SERPL-MCNC: 9.4 MG/DL — SIGNIFICANT CHANGE UP (ref 8.5–10.1)
CHLORIDE SERPL-SCNC: 101 MMOL/L — SIGNIFICANT CHANGE UP (ref 98–110)
CO2 SERPL-SCNC: 26 MMOL/L — SIGNIFICANT CHANGE UP (ref 17–32)
CREAT SERPL-MCNC: 1.1 MG/DL — SIGNIFICANT CHANGE UP (ref 0.7–1.5)
EGFR: 68 ML/MIN/1.73M2 — SIGNIFICANT CHANGE UP
GLUCOSE BLDC GLUCOMTR-MCNC: 117 MG/DL — HIGH (ref 70–99)
GLUCOSE BLDC GLUCOMTR-MCNC: 175 MG/DL — HIGH (ref 70–99)
GLUCOSE SERPL-MCNC: 124 MG/DL — HIGH (ref 70–99)
HCT VFR BLD CALC: 39.9 % — LOW (ref 42–52)
HGB BLD-MCNC: 13.9 G/DL — LOW (ref 14–18)
MCHC RBC-ENTMCNC: 30.6 PG — SIGNIFICANT CHANGE UP (ref 27–31)
MCHC RBC-ENTMCNC: 34.8 G/DL — SIGNIFICANT CHANGE UP (ref 32–37)
MCV RBC AUTO: 87.9 FL — SIGNIFICANT CHANGE UP (ref 80–94)
NRBC # BLD: 0 /100 WBCS — SIGNIFICANT CHANGE UP (ref 0–0)
PLATELET # BLD AUTO: 144 K/UL — SIGNIFICANT CHANGE UP (ref 130–400)
POTASSIUM SERPL-MCNC: 4.3 MMOL/L — SIGNIFICANT CHANGE UP (ref 3.5–5)
POTASSIUM SERPL-SCNC: 4.3 MMOL/L — SIGNIFICANT CHANGE UP (ref 3.5–5)
RBC # BLD: 4.54 M/UL — LOW (ref 4.7–6.1)
RBC # FLD: 13.4 % — SIGNIFICANT CHANGE UP (ref 11.5–14.5)
SODIUM SERPL-SCNC: 138 MMOL/L — SIGNIFICANT CHANGE UP (ref 135–146)
WBC # BLD: 4.84 K/UL — SIGNIFICANT CHANGE UP (ref 4.8–10.8)
WBC # FLD AUTO: 4.84 K/UL — SIGNIFICANT CHANGE UP (ref 4.8–10.8)

## 2022-07-28 RX ORDER — DEXLANSOPRAZOLE 30 MG/1
1 CAPSULE, DELAYED RELEASE ORAL
Qty: 0 | Refills: 0 | DISCHARGE

## 2022-07-28 RX ORDER — CLOPIDOGREL BISULFATE 75 MG/1
75 TABLET, FILM COATED ORAL DAILY
Refills: 0 | Status: DISCONTINUED | OUTPATIENT
Start: 2022-07-28 | End: 2022-07-28

## 2022-07-28 RX ORDER — TAMSULOSIN HYDROCHLORIDE 0.4 MG/1
1 CAPSULE ORAL
Qty: 0 | Refills: 0 | DISCHARGE

## 2022-07-28 RX ORDER — LIPASE/PROTEASE/AMYLASE 16-48-48K
1 CAPSULE,DELAYED RELEASE (ENTERIC COATED) ORAL
Qty: 0 | Refills: 0 | DISCHARGE

## 2022-07-28 RX ORDER — CILOSTAZOL 100 MG/1
100 TABLET ORAL
Refills: 0 | Status: DISCONTINUED | OUTPATIENT
Start: 2022-07-28 | End: 2022-07-29

## 2022-07-28 RX ORDER — CLOPIDOGREL BISULFATE 75 MG/1
75 TABLET, FILM COATED ORAL DAILY
Refills: 0 | Status: DISCONTINUED | OUTPATIENT
Start: 2022-07-29 | End: 2022-07-29

## 2022-07-28 RX ORDER — DEXTROSE 50 % IN WATER 50 %
15 SYRINGE (ML) INTRAVENOUS ONCE
Refills: 0 | Status: DISCONTINUED | OUTPATIENT
Start: 2022-07-28 | End: 2022-07-29

## 2022-07-28 RX ORDER — CILOSTAZOL 100 MG/1
1 TABLET ORAL
Qty: 0 | Refills: 0 | DISCHARGE

## 2022-07-28 RX ORDER — LORATADINE 10 MG/1
1 TABLET ORAL
Qty: 0 | Refills: 0 | DISCHARGE

## 2022-07-28 RX ORDER — OMEGA-3 ACID ETHYL ESTERS 1 G
2 CAPSULE ORAL
Refills: 0 | Status: DISCONTINUED | OUTPATIENT
Start: 2022-07-28 | End: 2022-07-29

## 2022-07-28 RX ORDER — DEXTROSE 50 % IN WATER 50 %
25 SYRINGE (ML) INTRAVENOUS ONCE
Refills: 0 | Status: DISCONTINUED | OUTPATIENT
Start: 2022-07-28 | End: 2022-07-29

## 2022-07-28 RX ORDER — ERGOCALCIFEROL 1.25 MG/1
1 CAPSULE ORAL
Qty: 0 | Refills: 0 | DISCHARGE

## 2022-07-28 RX ORDER — CYCLOSPORINE 0.5 MG/ML
1 EMULSION OPHTHALMIC
Qty: 0 | Refills: 0 | DISCHARGE

## 2022-07-28 RX ORDER — DAPAGLIFLOZIN 10 MG/1
10 TABLET, FILM COATED ORAL EVERY 24 HOURS
Refills: 0 | Status: DISCONTINUED | OUTPATIENT
Start: 2022-07-28 | End: 2022-07-28

## 2022-07-28 RX ORDER — TAMSULOSIN HYDROCHLORIDE 0.4 MG/1
0.4 CAPSULE ORAL AT BEDTIME
Refills: 0 | Status: DISCONTINUED | OUTPATIENT
Start: 2022-07-28 | End: 2022-07-29

## 2022-07-28 RX ORDER — SODIUM CHLORIDE 9 MG/ML
1000 INJECTION, SOLUTION INTRAVENOUS
Refills: 0 | Status: DISCONTINUED | OUTPATIENT
Start: 2022-07-28 | End: 2022-07-29

## 2022-07-28 RX ORDER — LIPASE/PROTEASE/AMYLASE 16-48-48K
1 CAPSULE,DELAYED RELEASE (ENTERIC COATED) ORAL
Refills: 0 | Status: DISCONTINUED | OUTPATIENT
Start: 2022-07-28 | End: 2022-07-29

## 2022-07-28 RX ORDER — LOSARTAN POTASSIUM 100 MG/1
50 TABLET, FILM COATED ORAL DAILY
Refills: 0 | Status: DISCONTINUED | OUTPATIENT
Start: 2022-07-28 | End: 2022-07-29

## 2022-07-28 RX ORDER — DEXTROSE 50 % IN WATER 50 %
12.5 SYRINGE (ML) INTRAVENOUS ONCE
Refills: 0 | Status: DISCONTINUED | OUTPATIENT
Start: 2022-07-28 | End: 2022-07-29

## 2022-07-28 RX ORDER — MAGNESIUM OXIDE 400 MG ORAL TABLET 241.3 MG
400 TABLET ORAL DAILY
Refills: 0 | Status: DISCONTINUED | OUTPATIENT
Start: 2022-07-28 | End: 2022-07-29

## 2022-07-28 RX ORDER — ATORVASTATIN CALCIUM 80 MG/1
40 TABLET, FILM COATED ORAL AT BEDTIME
Refills: 0 | Status: DISCONTINUED | OUTPATIENT
Start: 2022-07-28 | End: 2022-07-29

## 2022-07-28 RX ORDER — INSULIN LISPRO 100/ML
VIAL (ML) SUBCUTANEOUS
Refills: 0 | Status: DISCONTINUED | OUTPATIENT
Start: 2022-07-28 | End: 2022-07-29

## 2022-07-28 RX ORDER — PANTOPRAZOLE SODIUM 20 MG/1
40 TABLET, DELAYED RELEASE ORAL
Refills: 0 | Status: DISCONTINUED | OUTPATIENT
Start: 2022-07-28 | End: 2022-07-29

## 2022-07-28 RX ORDER — GLUCAGON INJECTION, SOLUTION 0.5 MG/.1ML
1 INJECTION, SOLUTION SUBCUTANEOUS ONCE
Refills: 0 | Status: DISCONTINUED | OUTPATIENT
Start: 2022-07-28 | End: 2022-07-29

## 2022-07-28 RX ORDER — ATENOLOL 25 MG/1
25 TABLET ORAL DAILY
Refills: 0 | Status: DISCONTINUED | OUTPATIENT
Start: 2022-07-28 | End: 2022-07-29

## 2022-07-28 RX ORDER — OLOPATADINE HYDROCHLORIDE 1 MG/ML
1 SOLUTION/ DROPS OPHTHALMIC
Qty: 0 | Refills: 0 | DISCHARGE

## 2022-07-28 RX ORDER — ASPIRIN/CALCIUM CARB/MAGNESIUM 324 MG
81 TABLET ORAL DAILY
Refills: 0 | Status: DISCONTINUED | OUTPATIENT
Start: 2022-07-28 | End: 2022-07-29

## 2022-07-28 RX ORDER — MULTIVIT-MIN/FERROUS GLUCONATE 9 MG/15 ML
1 LIQUID (ML) ORAL
Qty: 0 | Refills: 0 | DISCHARGE

## 2022-07-28 RX ORDER — MULTIVIT-MIN/FERROUS GLUCONATE 9 MG/15 ML
1 LIQUID (ML) ORAL DAILY
Refills: 0 | Status: DISCONTINUED | OUTPATIENT
Start: 2022-07-28 | End: 2022-07-29

## 2022-07-28 RX ORDER — LORATADINE 10 MG/1
10 TABLET ORAL DAILY
Refills: 0 | Status: DISCONTINUED | OUTPATIENT
Start: 2022-07-28 | End: 2022-07-29

## 2022-07-28 RX ORDER — EMPAGLIFLOZIN 10 MG/1
1 TABLET, FILM COATED ORAL
Qty: 0 | Refills: 0 | DISCHARGE

## 2022-07-28 RX ORDER — MAGNESIUM OXIDE 400 MG ORAL TABLET 241.3 MG
1 TABLET ORAL
Qty: 0 | Refills: 0 | DISCHARGE

## 2022-07-28 RX ADMIN — CILOSTAZOL 100 MILLIGRAM(S): 100 TABLET ORAL at 18:47

## 2022-07-28 RX ADMIN — TAMSULOSIN HYDROCHLORIDE 0.4 MILLIGRAM(S): 0.4 CAPSULE ORAL at 21:52

## 2022-07-28 RX ADMIN — ATORVASTATIN CALCIUM 40 MILLIGRAM(S): 80 TABLET, FILM COATED ORAL at 21:59

## 2022-07-28 RX ADMIN — Medication 2 GRAM(S): at 18:47

## 2022-07-28 NOTE — CHART NOTE - NSCHARTNOTEFT_GEN_A_CORE
INDICATION:    Claudication, Left > Right leg, Lauren class 3. History of prior right and left SFA stents.    PHYSICIAN: Dr. Go  ASSISTANT/FELLOW: Elmer Jimenez    ACCESS: Ultrasound Guided right femoral artery access    VASCULAR CLOSURE DEVICE: Perclose    ANGIOGRAM:  Selective Abdominal Aorta, Right Iliac, Right SFA, Left Iliac, Left SFA, Right and Left Lower Extremity DSA angiography     DETAILED PROCEDURE SUMMARY:  After obtaining informed consent, the patient was brought to the catheterization suite in a post- absorptive and non-sedated state. After this, a timeout was performed and I administered moderate sedation throughout this 45-minute procedure. An independent trained observer pushed medications at my direction, and monitored the patient’s level of consciousness and physiological status throughout. The patient was prepped and draped in the usual sterile manner. 1% lidocaine was used for local anesthesia and the patient was sedated as per endovascular lab protocol. Access was obtained in the right Femoral Artery using the modified Seldinger technique 7 Korean 45 cm destination Sheath was placed in the artery under fluoroscopy and ultrasound guidance which was utilized for assessment of arterial patency and actual real-time visualization of needle passage to the arterial lumen.     CONTRAST:  100 ml    DIAGNOSTIC FINDINGS    Abdominal Aorta: Patent  Right Common Iliac Artery: Patent  Right External Iliac Artery: Patent  Right Common Femoral Artery: Patent  Right SFA Artery: Patent proximally, patent stents in mid and distal SFA, 50% stenosis of distal SFA distal to the prior stent  Right Profunda Artery: 70% stenosis  Right Popliteal Artery: Patent  Right Tibial Peroneal Trunk Artery: Occluded  Right Anterior Tibial Artery: Patent  Right Peroneal Artery: Occluded  Right Posterior Tibial Artery: Occluded  Left Common Iliac Artery: Patent  Left External Iliac Artery: 70% stenosis, significant gradient (>20 mmHg) upon pullback across the lesion  Left Common Femoral Artery: Patent  Left SFA Artery: Patent proximally, 50% stenosis distal to the stent in distal SFA  Left Profunda Artery: Patent  Left Popliteal Artery: Patent  Left Tibial Peroneal Trunk Artery: Patent  Left Anterior Tibial Artery: Patent  Left Peroneal Artery: Patent  Left Posterior Tibial Artery: Occluded      INTERVENTIONS (if applicable):  - successful PTA of left External Iliac artery with 6.0 x 60 mm [place] OTW balloon, 7.0 x 40 mm [place] OTW balloon, and 10 x 40 mm Protege self expanding stent    COMPLICATIONS: none    PERIPHERAL DIAGNOSTIC ANGIOGRAM/INTERVENTION SUMMARY:  - significant left external iliac artery stenosis, s/p PTA with balloon angioplasty and 10 x 40 mm Protege self expanding stent.    RECOMMENDATIONS:  IV fluids: 75cc/hr x 6 hours  Antiplatelets: ASA, Plavix  Aggressive risk factor modification  Disposition: Monitor overnight in Cards tele for IV hydration INDICATION:    Claudication, Left > Right leg, Lauren class 3. History of prior right and left SFA stents.    PHYSICIAN: Dr. Go  ASSISTANT/FELLOW: Elmer Jimenez    ACCESS: Ultrasound Guided right femoral artery access    VASCULAR CLOSURE DEVICE: Perclose    ANGIOGRAM:  Selective Abdominal Aorta, Right Iliac, Right SFA, Left Iliac, Left SFA, Right and Left Lower Extremity DSA angiography     DETAILED PROCEDURE SUMMARY:  After obtaining informed consent, the patient was brought to the catheterization suite in a post- absorptive and non-sedated state. After this, a timeout was performed and I administered moderate sedation throughout this 45-minute procedure. An independent trained observer pushed medications at my direction, and monitored the patient’s level of consciousness and physiological status throughout. The patient was prepped and draped in the usual sterile manner. 1% lidocaine was used for local anesthesia and the patient was sedated as per endovascular lab protocol. Access was obtained in the right Femoral Artery using the modified Seldinger technique 7 Green[place]ic 45 cm destination Sheath was placed in the artery under fluoroscopy and ultrasound guidance which was utilized for assessment of arterial patency and actual real-time visualization of needle passage to the arterial lumen.     CONTRAST:  100 ml    DIAGNOSTIC FINDINGS    Abdominal Aorta: Patent  Right Common Iliac Artery: Patent  Right External Iliac Artery: Patent  Right Common Femoral Artery: Patent  Right SFA Artery: Patent proximally, patent stents in mid and distal SFA, 50% stenosis of distal SFA distal to the prior stent  Right Profunda Artery: 70% stenosis  Right Popliteal Artery: Patent  Right Tibial Peroneal Trunk Artery: Occluded  Right Anterior Tibial Artery: Patent  Right Peroneal Artery: Occluded  Right Posterior Tibial Artery: Occluded  Left Common Iliac Artery: Patent  Left External Iliac Artery: 70% stenosis, significant gradient (>20 mmHg) upon pullback across the lesion  Left Common Femoral Artery: Patent  Left SFA Artery: Patent proximally, 50% stenosis distal to the stent in distal SFA  Left Profunda Artery: Patent  Left Popliteal Artery: Patent  Left Tibial Peroneal Trunk Artery: Patent  Left Anterior Tibial Artery: Patent  Left Peroneal Artery: Occluded  Left Posterior Tibial Artery: Patent      INTERVENTIONS (if applicable):  - successful PTA of left External Iliac artery with 6.0 x 60 mm [place] OTW balloon, 7.0 x 40 mm [place] OTW balloon, and 10 x 40 mm Protege self expanding stent    COMPLICATIONS: none    PERIPHERAL DIAGNOSTIC ANGIOGRAM/INTERVENTION SUMMARY:  - significant left external iliac artery stenosis, s/p PTA with balloon angioplasty and 10 x 40 mm Protege self expanding stent.    RECOMMENDATIONS:  IV fluids: 75cc/hr x 6 hours  Antiplatelets: ASA, Plavix  Aggressive risk factor modification  Disposition: Monitor overnight in Cards tele for IV hydration

## 2022-07-28 NOTE — DISCHARGE NOTE PROVIDER - NSDCMRMEDTOKEN_GEN_ALL_CORE_FT
aspirin 81 mg oral delayed release tablet: 1 tab(s) orally once a day with a meal  atenolol 25 mg oral tablet: 1 tab(s) orally once a day in am  atorvastatin 40 mg oral tablet: 1 tab(s) orally once a day (at bedtime)  bisacodyl 5 mg oral delayed release tablet: 1 tab(s) orally once a day, As Needed  Cardiac Rehab: Patient is referred to cardiac rehab, 3 sessions per week for 12 weeks, due to recent PCI.   cilostazol 100 mg oral tablet: 1 tab(s) orally 2 times a day  clopidogrel 75 mg oral tablet: 1 tab(s) orally once a day  Creon 36,000 units oral delayed release capsule: 1 cap(s) orally 3 times a day  Dexilant 60 mg oral delayed release capsule: 1 cap(s) orally once a day  glipiZIDE 10 mg oral tablet: 1 tab(s) orally once a day  Jardiance 25 mg oral tablet: 1 tab(s) orally once a day (in the morning)  Jentadueto XR 5 mg-1000 mg oral tablet, extended release: 1 tab(s) orally once a day.   HOLD 48hrs post angiogram  loratadine 10 mg oral tablet: 1 tab(s) orally once a day  losartan 50 mg oral tablet: 1 tab(s) orally once a day in am  magnesium oxide 400 mg (240 mg elemental magnesium) oral tablet: 1 tab(s) orally once a day  olopatadine 0.2% ophthalmic solution: 1 drop(s) to each affected eye once a day  Restasis 0.05% ophthalmic emulsion: 1 drop(s) to each affected eye every 12 hours, As Needed  tamsulosin 0.4 mg oral capsule: 1 cap(s) orally once a day  Thera-Tabs M oral tablet: 1 tab(s) orally once a day  Vitamin D2 1.25 mg (50,000 intl units) oral capsule: 1 cap(s) orally once a week   aspirin 81 mg oral delayed release tablet: 1 tab(s) orally once a day with a meal  atenolol 25 mg oral tablet: 1 tab(s) orally once a day in am  atorvastatin 40 mg oral tablet: 1 tab(s) orally once a day (at bedtime)  bisacodyl 5 mg oral delayed release tablet: 1 tab(s) orally once a day, As Needed  cilostazol 100 mg oral tablet: 1 tab(s) orally 2 times a day  clopidogrel 75 mg oral tablet: 1 tab(s) orally once a day  Creon 36,000 units oral delayed release capsule: 1 cap(s) orally 3 times a day  Dexilant 60 mg oral delayed release capsule: 1 cap(s) orally once a day  glipiZIDE 10 mg oral tablet: 1 tab(s) orally once a day  Jardiance 25 mg oral tablet: 1 tab(s) orally once a day (in the morning)  Jentadueto XR 5 mg-1000 mg oral tablet, extended release: 1 tab(s) orally once a day.   Please restart on Sunday 7/31 in the morning.  loratadine 10 mg oral tablet: 1 tab(s) orally once a day  losartan 50 mg oral tablet: 1 tab(s) orally once a day in am  magnesium oxide 400 mg (240 mg elemental magnesium) oral tablet: 1 tab(s) orally once a day  olopatadine 0.2% ophthalmic solution: 1 drop(s) to each affected eye once a day  Restasis 0.05% ophthalmic emulsion: 1 drop(s) to each affected eye every 12 hours, As Needed  tamsulosin 0.4 mg oral capsule: 1 cap(s) orally once a day  Thera-Tabs M oral tablet: 1 tab(s) orally once a day  Vitamin D2 1.25 mg (50,000 intl units) oral capsule: 1 cap(s) orally once a week

## 2022-07-28 NOTE — PATIENT PROFILE ADULT - FALL HARM RISK - HARM RISK INTERVENTIONS

## 2022-07-28 NOTE — DISCHARGE NOTE PROVIDER - CARE PROVIDER_API CALL
Kervin Go (MD)  Cardiovascular Disease; Endovascular Medicine; Interventional Cardiology; Vascular Medicine  [address] [place] [address], [place] [address]  Phone: [phone]  Fax: [phone]  Follow Up Time: 2 weeks

## 2022-07-28 NOTE — H&P CARDIOLOGY - NSICDXPASTMEDICALHX_GEN_ALL_CORE_FT
PAST MEDICAL HISTORY:  Coronary artery disease s/p MI and multiple PCI    HLD (hyperlipidemia)     HTN (hypertension)     PVD (peripheral vascular disease)     Type 2 diabetes mellitus      PAST MEDICAL HISTORY:  Coronary artery disease s/p MI and multiple PCI,pt reports ~ 10 stents    HLD (hyperlipidemia)     HTN (hypertension)     PVD (peripheral vascular disease) pt reports ~ 4 stents    Type 2 diabetes mellitus

## 2022-07-28 NOTE — DISCHARGE NOTE PROVIDER - HOSPITAL COURSE
Patient is a 81 yo Mohawk Speaking male spoken via  #[id number] Adeel, former smoker, with a PMH of HTN, HLD, CAD (s/p prior KELLY proximal/midLAD and OM2 w/ brachytherapy), DM, PAD (s/p prior left and right SFA intervention) was recently admitted on 4/22/22  with complaints of exertional chest pain relieved with rest and associated sob. Patient also reported bilateral LE claudication when resting at night and with exertion of less than half a city block over the past couple years (worse over the past 6 months). Patient underwent Cardiac cath on 4/22/22 now s/p PTCA OM1 (90%, ISR), other findings of mid LAD 30-50%, Ramus patent stent, proximal LAD patent stent, distal LAD patent stent, distal LCx patent stent, EDP 5mmHg, no AS/MR. Runoff was attempted and showed right iliac disease, patent SFA stents, but unsuccessful runoff below the knee. Patient advised to followup for further PAD evaluation.     Upon this admission, patient underwent peripheral angiogram indicated for claudication, left > right leg, Spink class 3.  Patient now s/p Selective Abdominal Aorta, Right Iliac, Right SFA, Left Iliac, Left SFA, Right and Left Lower Extremity DSA angiography with significant left external iliac artery stenosis, s/p PTA with balloon angioplasty and 10 x 40 mm Protege self expanding stent. Patient monitored overnight, no events noted. Patient advised to take Aspirin 81mg /Plavix 75mg daily. Patient is hemodynamically stable for discharge home today and will followup with Dr. Go in 2 weeks*** Patient is a 83 yo Lithuanian Speaking male spoken via  #[id number] and [id number], former smoker, with a CAD (most recent intervention on 4/22/22 was PTCA of OM1 with other findings of 30-50% stenosis of mLAD with patent stents in the pLAD, dLAD, ramus, and dLCx), PAD (s/p prior left and right SFA intervention), HTN, HLD, and NIDDM who was admitted for elective peripheral angiogram for known left SFA disease with [place] class 3 symptoms.      Patient now s/p selective Abdominal Aorta, Right Iliac, Right SFA, Left Iliac, Left SFA, Right and Left Lower Extremity DSA angiography with significant left external iliac artery stenosis, s/p PTA with balloon angioplasty and 10 x 40 mm Protege self expanding stent. Patient monitored overnight, no events noted.     Patient was informed he should continue taking aspirin 81 mg daily and Plavix 75 mg daily, and was also told he should not take Jentadueta until Sunday 7/31 in the morning. Patient is hemodynamically stable for discharge home today and will followup with Dr. Go in 2 weeks.

## 2022-07-28 NOTE — DISCHARGE NOTE PROVIDER - ATTENDING DISCHARGE PHYSICAL EXAMINATION:
Patient seen and evaluated at bedside prior to discharge.  Denies any complaints at the time of discharge and seen walking the hallway without issue.  Access site on evaluation was without hematoma, ecchymosis, or bruit. Distal exam: warm, well perfused, 1+ distal pulses.  Patient to follow up with Dr. Go after discharge.

## 2022-07-28 NOTE — DISCHARGE NOTE PROVIDER - NSDCCPTREATMENT_GEN_ALL_CORE_FT
PRINCIPAL PROCEDURE  Procedure: Angiogram, peripheral, with PTA if indicated  Findings and Treatment: You underwent Selective Abdominal Aorta, Right Iliac, Right SFA, Left Iliac, Left SFA, Right and Left Lower Extremity DSA angiography with significant left external iliac artery stenosis, s/p PTA with balloon angioplasty and 10 x 40 mm Protege self expanding stent.  Discharge instructions as follows:  - Continue DAPT (Aspirin 81mg /Plavix 75mg ) daily   - No strenuous activity for 3 days (routine walking okay)  - May increase activity and walking as tolerated on Monday  - No driving for 3 days  - No heavy lifting >30lbs for 2 weeks  - Some swelling to the leg is expected  - May shower in 24 hours, no baths or pools  - Leave dressing in place for 24-48 hours, if does not fall off on own may remove after 48 hours  -Followup with Dr. Go in 2 weeks.         PRINCIPAL PROCEDURE  Procedure: Angiogram, peripheral, with PTA if indicated  Findings and Treatment: You underwent a peripheral aniogram with Dr. Go and a stent was placed in an artery in yoru left leg.   Discharge instructions as follows:  - Please take aspirin 81 mg daily and Plavix 75 mg daily, unless directed by your Cardiologist.  - Please do NOT take Jentadueto. You can restart this medication on Sunday 7/31 in the morning.  - Support the groin site with your hand when you sneeze, cough, or use the bathroom.  - After 24 hours, you may shower.  - Leave the dressing in place for 24-48 hours. If does not fall off on own, you may remove after 48 hours.   - Do not drive or operate heavy machinery for 3 days.  - Do not bathe or swim for 1 week.   - No heavy lifting (objects more than 30 lbs) for 2 weeks.  - Do not rub or apply lotion, cream, or powder to the affected site. Leave it open to the air.   - Any sudden swelling, redness, fever, discharge, or severe pain, call your Cardiologist or call the Catheterization Lab at [phone].  - If there is bleeding from the puncture site, apply direct firm pressure on the site and call 911.

## 2022-07-28 NOTE — H&P CARDIOLOGY - HISTORY OF PRESENT ILLNESS
83 yo Kyrgyz Speaking M, former smoker, with a PMH of HTN, HLD, CAD s/p KELLY to p/mLAD, OM2 with brachytherapy, DM, remote DVT, PAD s/p KELLY (L) and (R) dSFA who presented to outpatient cardiologist Dr. Marie with complaints of worsening midline substernal exertional CP relieved with rest. CP is a/w SOB. Also endorses B/L LE claudication at rest at night & upon ambulation of <1/2 city blocks over past couple years but progressively worsening over past >6 months. Denies dizziness, diaphoresis, palpitations, orthopnea/PND, BRBPR, hematuria, melena, LE swelling, recent travel/sick contacts/illness, non-healing wounds, change in LE color/hair pattern/temp. In light of patient's risk factors, CCS angina class III sx and known CAD, patient is referred for cardiac catheterization with possible intervention if clinically indicated.     LHC with b/l LE runoff 9/14/20: diagnostic LHC. LM normal, patent p-mLAd stent, IVUS of pLAD negative. OM2 stent patent, RCA nondominant /small. LVEF 50%, LVEDP 5mmHg, No AS/MR R radial access. B/l LE runoff with mild disease in b/l ileus. Patent L and R dSFA stent. Moderate b/l AT disease. 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   81 yo Armenian Speaking M interviewed via  # [id number] former smoker, with a PMH of HTN, HLD, CAD s/p KELLY to p/mLAD, OM2 with brachytherapy(pt reports ~ 10 stents), DM, PAD s/p KELLY (L) and (R) dSFA(pt reports ~ 4 stents. PSH: appy, neck sx     Pt endorsesB/L LE claudication L>R at rest at night & upon ambulation of <1/2 city blocks over past couple years but progressively worsening over past >6 months., peripheral angiogram recommended for lisa class 3 symptoms    Patient is a 82y Male who presents to the cardiology department for elective cardiac catherization.     SUBJ:    MEDICATIONS  (STANDING):    MEDICATIONS  (PRN):        Vital Signs Last 24 Hrs  T(C): --  T(F): --  HR: --  BP: --  BP(mean): --  RR: --  SpO2: --        Pre cath note:  indication:  [ ] STEMI                [ ] NSTEMI                 [ ] Acute coronary syndrome                   [ ]Unstable Angina   [ ] high risk  [ ] intermediate risk  [ ] low risk                   [ ] Stable Angina     non-invasive testing:                          Date:                     result: [ ] high risk  [ ] intermediate risk  [ ] low risk    Anti- Anginal medications:                    [ ] not used                       [ ] used                   [ ] not used but strong indication not to use    Ejection Fraction                   [ ] <29            [ ] 30-39%   [ ] 40-49%     [ ]>50%    CHF                   [ ] active (within last 14 days on meds   [ ] Chronic (on meds but no exacerbation)    COPD                   [ ] mild (on chronic bronchodilators)  [ ] moderate (on chronic steroid therapy)      [ ] severe (indication for home O2 or PACO2 >50)    Other risk factors:                     [ ] Previous MI                     [ ] CVA/ stroke                    [ ] carotid stent/ CEA                    [ ] PVD/PAD- (arterial aneurysm, non-palpable pulses, tortuous vessel with inability to insert catheter, infra-renal dissection, renal or subclavian artery stenosis)                    [ ] diabetic                    [ ] previous CABG                    [ ] Renal Failure     Bleeding Risk:                           13.9   4.84  )-----------( 144      ( 28 Jul 2022 11:41 )             39.9     07-28    138  |  101  |  22<H>  ----------------------------<  124<H>  4.3   |  26  |  1.1    Ca    9.4      28 Jul 2022 11:41        RIGHT RADIAL ARTERY EVALUATION:  ABIMAEL TEST: [] Negative          [] Positive  BARBEAU TEST: [] Class A           [] Class B           [] Class C            [] Class D    REVIEW OF SYSTEMS:  CONSTITUTIONAL: No fever, weight loss, or fatigue  CARDIOLOGY: PAtient denies chest pain, shortness of breath or syncopal episodes.   RESPIRATORY: denies shortness of breath, wheezing  NEUROLOGICAL: NO weakness, no focal deficits to report.  ENDOCRINOLOGICAL: no recent change in diabetic medications.   GI: no BRBPR, no N,V,diarrhea.     PHYSICAL EXAM:  · CONSTITUTIONAL:	Well-developed, well nourished    ·RESPIRATORY:   airway patent; breath sounds equal; good air movement; respirations non-labored; clear to auscultation bilaterally; no chest wall tenderness; no intercostal retractions; no rales,rhonchi or wheeze  · CARDIOVASCULAR	regular rate and rhythm  no rub  no murmur  normal PMI  · EXTREMITIES: No cyanosis, clubbing or edema  · VASCULAR: 	b/l DP palpable, R PT doppler, L PT absent  	              81 yo Belarusian Speaking M interviewed via  # [id number] former smoker, with a PMH of HTN, HLD, CAD s/p KELLY to p/mLAD, OM2 with brachytherapy(pt reports ~ 10 stents), DM, PAD s/p KELLY (L) and (R) dSFA(pt reports ~ 4 stents. PSH: appy, neck sx     Pt endorsesB/L LE claudication L>R at rest at night & upon ambulation of <1/2 city blocks over past couple years but progressively worsening over past >6 months., pt had recent  CTA revealing b/l peroneal and posterior tibial artery occlusion in proximal calf , peripheral angiogram recommended for lisa class 3 symptoms        Vital Signs Last 24 Hrs  T(C): --  T(F): --  HR: --57  BP: --122/63  BP(mean): --85  RR: --  SpO2: --96% RA                          13.9   4.84  )-----------( 144      ( 28 Jul 2022 11:41 )             39.9     07-28    138  |  101  |  22<H>  ----------------------------<  124<H>  4.3   |  26  |  1.1    Ca    9.4      28 Jul 2022 11:41          REVIEW OF SYSTEMS:  CONSTITUTIONAL: No fever, weight loss, or fatigue  CARDIOLOGY: PAtient denies chest pain, shortness of breath or syncopal episodes.   RESPIRATORY: denies shortness of breath, wheezing  NEUROLOGICAL: NO weakness, no focal deficits to report.  ENDOCRINOLOGICAL: no recent change in diabetic medications.   GI: no BRBPR, no N,V,diarrhea.   EXT: pt reports b/l LE pain with ambulation and at night, L>R    PHYSICAL EXAM:  · CONSTITUTIONAL:	Well-developed, well nourished    ·RESPIRATORY:   airway patent; breath sounds equal; good air movement; respirations non-labored; clear to auscultation bilaterally; no chest wall tenderness; no intercostal retractions; no rales,rhonchi or wheeze  · CARDIOVASCULAR	regular rate and rhythm  no rub  no murmur  normal PMI  · EXTREMITIES: No cyanosis, clubbing or edema  · VASCULAR: 	b/l DP palpable, R PT doppler, L PT absent

## 2022-07-29 ENCOUNTER — TRANSCRIPTION ENCOUNTER (OUTPATIENT)
Age: 82
End: 2022-07-29

## 2022-07-29 VITALS — OXYGEN SATURATION: 95 %

## 2022-07-29 LAB
A1C WITH ESTIMATED AVERAGE GLUCOSE RESULT: 7.5 % — HIGH (ref 4–5.6)
ANION GAP SERPL CALC-SCNC: 11 MMOL/L — SIGNIFICANT CHANGE UP (ref 7–14)
BUN SERPL-MCNC: 21 MG/DL — HIGH (ref 10–20)
CALCIUM SERPL-MCNC: 9 MG/DL — SIGNIFICANT CHANGE UP (ref 8.5–10.1)
CHLORIDE SERPL-SCNC: 103 MMOL/L — SIGNIFICANT CHANGE UP (ref 98–110)
CO2 SERPL-SCNC: 24 MMOL/L — SIGNIFICANT CHANGE UP (ref 17–32)
CREAT SERPL-MCNC: 0.9 MG/DL — SIGNIFICANT CHANGE UP (ref 0.7–1.5)
EGFR: 86 ML/MIN/1.73M2 — SIGNIFICANT CHANGE UP
ESTIMATED AVERAGE GLUCOSE: 169 MG/DL — HIGH (ref 68–114)
GLUCOSE BLDC GLUCOMTR-MCNC: 153 MG/DL — HIGH (ref 70–99)
GLUCOSE SERPL-MCNC: 125 MG/DL — HIGH (ref 70–99)
HCT VFR BLD CALC: 38.3 % — LOW (ref 42–52)
HGB BLD-MCNC: 13.1 G/DL — LOW (ref 14–18)
MAGNESIUM SERPL-MCNC: 2.1 MG/DL — SIGNIFICANT CHANGE UP (ref 1.8–2.4)
MCHC RBC-ENTMCNC: 29.8 PG — SIGNIFICANT CHANGE UP (ref 27–31)
MCHC RBC-ENTMCNC: 34.2 G/DL — SIGNIFICANT CHANGE UP (ref 32–37)
MCV RBC AUTO: 87.2 FL — SIGNIFICANT CHANGE UP (ref 80–94)
NRBC # BLD: 0 /100 WBCS — SIGNIFICANT CHANGE UP (ref 0–0)
PLATELET # BLD AUTO: 130 K/UL — SIGNIFICANT CHANGE UP (ref 130–400)
POTASSIUM SERPL-MCNC: 4 MMOL/L — SIGNIFICANT CHANGE UP (ref 3.5–5)
POTASSIUM SERPL-SCNC: 4 MMOL/L — SIGNIFICANT CHANGE UP (ref 3.5–5)
RBC # BLD: 4.39 M/UL — LOW (ref 4.7–6.1)
RBC # FLD: 13.3 % — SIGNIFICANT CHANGE UP (ref 11.5–14.5)
SODIUM SERPL-SCNC: 138 MMOL/L — SIGNIFICANT CHANGE UP (ref 135–146)
WBC # BLD: 6.55 K/UL — SIGNIFICANT CHANGE UP (ref 4.8–10.8)
WBC # FLD AUTO: 6.55 K/UL — SIGNIFICANT CHANGE UP (ref 4.8–10.8)

## 2022-07-29 PROCEDURE — 99232 SBSQ HOSP IP/OBS MODERATE 35: CPT

## 2022-07-29 RX ADMIN — CILOSTAZOL 100 MILLIGRAM(S): 100 TABLET ORAL at 06:08

## 2022-07-29 RX ADMIN — Medication 1: at 08:15

## 2022-07-29 RX ADMIN — PANTOPRAZOLE SODIUM 40 MILLIGRAM(S): 20 TABLET, DELAYED RELEASE ORAL at 06:08

## 2022-07-29 RX ADMIN — Medication 2 GRAM(S): at 06:09

## 2022-07-29 RX ADMIN — ATENOLOL 25 MILLIGRAM(S): 25 TABLET ORAL at 06:08

## 2022-07-29 RX ADMIN — LOSARTAN POTASSIUM 50 MILLIGRAM(S): 100 TABLET, FILM COATED ORAL at 06:08

## 2022-07-29 RX ADMIN — Medication 1 CAPSULE(S): at 08:17

## 2022-07-29 NOTE — DISCHARGE NOTE NURSING/CASE MANAGEMENT/SOCIAL WORK - PATIENT PORTAL LINK FT
You can access the FollowMyHealth Patient Portal offered by [facility] by registering at the following website: [email or web address]. By joining [facility]’s FollowMyHealth portal, you will also be able to view your health information using other applications (apps) compatible with our system.

## 2022-08-01 DIAGNOSIS — Z95.5 PRESENCE OF CORONARY ANGIOPLASTY IMPLANT AND GRAFT: ICD-10-CM

## 2022-08-01 DIAGNOSIS — Z79.84 LONG TERM (CURRENT) USE OF ORAL HYPOGLYCEMIC DRUGS: ICD-10-CM

## 2022-08-01 DIAGNOSIS — Z79.02 LONG TERM (CURRENT) USE OF ANTITHROMBOTICS/ANTIPLATELETS: ICD-10-CM

## 2022-08-01 DIAGNOSIS — Z87.891 PERSONAL HISTORY OF NICOTINE DEPENDENCE: ICD-10-CM

## 2022-08-01 DIAGNOSIS — Z79.82 LONG TERM (CURRENT) USE OF ASPIRIN: ICD-10-CM

## 2022-08-01 DIAGNOSIS — I25.2 OLD MYOCARDIAL INFARCTION: ICD-10-CM

## 2022-08-01 DIAGNOSIS — I73.9 PERIPHERAL VASCULAR DISEASE, UNSPECIFIED: ICD-10-CM

## 2022-08-01 DIAGNOSIS — E11.51 TYPE 2 DIABETES MELLITUS WITH DIABETIC PERIPHERAL ANGIOPATHY WITHOUT GANGRENE: ICD-10-CM

## 2022-08-01 DIAGNOSIS — I10 ESSENTIAL (PRIMARY) HYPERTENSION: ICD-10-CM

## 2022-08-01 DIAGNOSIS — I70.223 ATHEROSCLEROSIS OF NATIVE ARTERIES OF EXTREMITIES WITH REST PAIN, BILATERAL LEGS: ICD-10-CM

## 2022-08-01 DIAGNOSIS — Z95.820 PERIPHERAL VASCULAR ANGIOPLASTY STATUS WITH IMPLANTS AND GRAFTS: ICD-10-CM

## 2022-08-01 DIAGNOSIS — I25.10 ATHEROSCLEROTIC HEART DISEASE OF NATIVE CORONARY ARTERY WITHOUT ANGINA PECTORIS: ICD-10-CM

## 2022-09-03 LAB
ANION GAP SERPL CALC-SCNC: 13 MMOL/L
APTT BLD: 30.9 SEC
BASOPHILS # BLD AUTO: 0.05 K/UL
BASOPHILS NFR BLD AUTO: 0.9 %
BUN SERPL-MCNC: 19 MG/DL
CALCIUM SERPL-MCNC: 10 MG/DL
CHLORIDE SERPL-SCNC: 98 MMOL/L
CO2 SERPL-SCNC: 24 MMOL/L
CREAT SERPL-MCNC: 1.06 MG/DL
EGFR: 71 ML/MIN/1.73M2
EOSINOPHIL # BLD AUTO: 0.03 K/UL
EOSINOPHIL NFR BLD AUTO: 0.5 %
GLUCOSE SERPL-MCNC: 300 MG/DL
HCT VFR BLD CALC: 43.7 %
HGB BLD-MCNC: 14.1 G/DL
IMM GRANULOCYTES NFR BLD AUTO: 0.5 %
INR PPP: 0.97 RATIO
LYMPHOCYTES # BLD AUTO: 1.63 K/UL
LYMPHOCYTES NFR BLD AUTO: 27.9 %
MAN DIFF?: NORMAL
MCHC RBC-ENTMCNC: 30.3 PG
MCHC RBC-ENTMCNC: 32.3 GM/DL
MCV RBC AUTO: 93.8 FL
MONOCYTES # BLD AUTO: 0.38 K/UL
MONOCYTES NFR BLD AUTO: 6.5 %
NEUTROPHILS # BLD AUTO: 3.73 K/UL
NEUTROPHILS NFR BLD AUTO: 63.7 %
PLATELET # BLD AUTO: 157 K/UL
POTASSIUM SERPL-SCNC: 4 MMOL/L
PT BLD: 11.3 SEC
RBC # BLD: 4.66 M/UL
RBC # FLD: 13.8 %
SARS-COV-2 N GENE NPH QL NAA+PROBE: NOT DETECTED
SODIUM SERPL-SCNC: 136 MMOL/L
WBC # FLD AUTO: 5.85 K/UL

## 2022-09-29 NOTE — PATIENT PROFILE ADULT. - NS SC CAGE ALCOHOL EYE OPENER
no YOU WERE SEEN IN THE ED FOR: Abdominal pain and hematuria    WHILE YOU WERE HERE, YOU HAD: Fluids and pain medications  CT scan showed a stone in your kidney    FOR PAIN, YOU MAY TAKE TYLENOL (Acetaminophen) AND/OR IBUPROFEN (Advil or Motrin). FOLLOW THE INSTRUCTIONS ON THE LABEL/CONTAINER.    PLEASE FOLLOW UP WITH YOUR PRIVATE PHYSICIAN/ UROLOGIST WITHIN THE NEXT 48 HOURS. BRING COPIES OF YOUR RESULTS.    RETURN TO THE EMERGENCY DEPARTMENT IF YOU EXPERIENCE ANY NEW/CONCERNING/WORSENING SYMPTOMS SUCH AS BUT NOT LIMITED TO:      Kidney Stones    Kidney stones (urolithiasis) are crystal deposits that form inside your kidneys. Pain is caused by the stone moving through the urinary tract, causing spasms of the ureter. Drink enough water and fluids to keep your urine clear or pale yellow. This will help you to pass the stone or stone fragments. If provided a strainer, strain all urine and keep all particulate matter and stones for a follow up appointment with a urologist.    SEEK IMMEDIATE MEDICAL CARE IF YOU HAVE ANY OF THE FOLLOWING SYMPTOMS: pain not controlled with medication, fever/chills, worsening vomiting, inability to urinate, or dizziness/lightheadedness.    You have been diagnosed with a kidney stone. To control the pain, you should take Ibuprofen 400 mg along with Tylenol 650mg every 6 hours. These are both over the counter medications that you can  at your local pharmacy without a prescription. We also sent a stronger pain medication to your pharmacy that you should only take when you are still having pain despite trying Tylenol and Ibuprofen. If you are still having severe pain in 48 hours you should return to the emergency room or a urologist if able. If you began having fever, uncontrollable nausea and vomiting then you also need to come back to the ED.

## 2022-12-02 ENCOUNTER — RX RENEWAL (OUTPATIENT)
Age: 82
End: 2022-12-02

## 2023-01-31 NOTE — REVIEW OF SYSTEMS
What Type Of Note Output Would You Prefer (Optional)?: Standard Output
How Severe Is Your Skin Lesion?: mild
Has Your Skin Lesion Been Treated?: been treated
Is This A New Presentation, Or A Follow-Up?: Skin Lesion
[Negative] : Heme/Lymph

## 2023-06-15 NOTE — PATIENT PROFILE ADULT - DATE OF LAST VACCINATION
Anesthesia Evaluation     Patient summary reviewed and Nursing notes reviewed   no history of anesthetic complications:   NPO Solid Status: > 8 hours  NPO Liquid Status: > 2 hours           Airway   Mallampati: II  TM distance: >3 FB  Neck ROM: full  No difficulty expected  Dental - normal exam     Pulmonary - negative pulmonary ROS and normal exam    breath sounds clear to auscultation  Cardiovascular - negative cardio ROS and normal exam  Exercise tolerance: good (4-7 METS)    Rhythm: regular  Rate: normal        Neuro/Psych  (+) headaches  GI/Hepatic/Renal/Endo - negative ROS     Musculoskeletal (-) negative ROS    Abdominal    Substance History - negative use     OB/GYN negative ob/gyn ROS         Other - negative ROS       ROS/Med Hx Other: PAT Nursing Notes unavailable.                   Anesthesia Plan    ASA 1     general     (Patient understands anesthesia not responsible for dental damage.)  intravenous induction     Anesthetic plan, risks, benefits, and alternatives have been provided, discussed and informed consent has been obtained with: patient.  Pre-procedure education provided (French  utilized)  Use of blood products discussed with patient  Consented to blood products.    Plan discussed with CRNA.    CODE STATUS:          01-Jun-2021

## 2024-02-07 NOTE — H&P ADULT - DOES THIS PATIENT HAVE A HISTORY OF OR HAS BEEN DX WITH HEART FAILURE?
Margaret Mccall is a 21 y.o. male who presents for the following: Alopecia (Scalp).     Review of Systems:  No other skin or systemic complaints other than what is documented elsewhere in the note.    The following portions of the chart were reviewed this encounter and updated as appropriate:          Skin Cancer History  No skin cancer on file.      Specialty Problems          Dermatology Problems    Nail abnormality        Objective   Well appearing patient in no apparent distress; mood and affect are within normal limits.    A focused skin examination was performed. All findings within normal limits unless otherwise noted below.    Assessment/Plan   1. Alopecia  Scalp  Bifrontal recession    minoxidil (Loniten) 2.5 mg tablet - Scalp  Take 1 tablet (2.5 mg) by mouth once daily. Start with 1/2 tab daily and increase up to one pill a day if well tolerated      His chronic hair condition has been getting worse lately and so we are choosing to escalate to an rx management  He ended up deciding on minoxidil tablets rather than foam or finasteride      Had 20 minutes discussion about available options:  Finasteride is moderately effective   It can give reverse ejaculation or depression in a minority of cases and can lower your sex drive in a minority of cases  This would be rare    Minoxidil comes as a foam called Rogaine. Its mildly effective. If you want to use it try the 5% foam twice daily to affected areas  Can cause headaches/ lower your blood pressure and most commonly cause skin redness and irritation at the area    The mots effective thing is minoxidil pill   That can definitely lower blood pressure and cause headaches to a higher rate but still overall very well tolerated  It has other very rare and potentially serious side effects like fluid build up around your heart (that very rarely happens)    Any of those medication you will need to take for as long as you want your hair to look thicker  
no

## 2024-04-03 NOTE — HISTORY OF PRESENT ILLNESS
[facility]    Juan Antonio Israel Patient Status:  Hospital Outpatient Surgery   Age/Gender 63 year old female MRN [id number]   Location [facility] PERIOPERATIVE SERVICE Attending Manolo Robins MD   Hosp Day # 0 PCP Tennille Subramanian MD       Anesthesia Post-op Note    EXCISION  LEFT TONSIL LESION  EXCISION UVULA LESION    Procedure Summary       Date: 04/03/24 Room / Location:  MAIN OR 02 / [facility] MAIN OR    Anesthesia Start: 1351 Anesthesia Stop: 1438    Procedure: EXCISION  LEFT TONSIL LESION EXCISION UVULA LESION (Left: Mouth) Diagnosis: (LEFT TONSIL LESION)    Surgeons: Manolo Robins MD Anesthesiologist: Kyra Montes De Oca MD    Anesthesia Type: general ASA Status: 2            Anesthesia Type: general    Vitals Value Taken Time   /73 04/03/24 1513   Temp 98 °F (36.7 °C) 04/03/24 1510   Pulse 77 04/03/24 1525   Resp 16 04/03/24 1510   SpO2 95 % 04/03/24 1525   Vitals shown include unfiled device data.    Patient Location: PACU    Anesthesia Type: general    Airway Patency: patent and extubated    Postop Pain Control: adequate    Mental Status: mildly sedated but able to meaningfully participate in the post-anesthesia evaluation    Nausea/Vomiting: none    Cardiopulmonary/Hydration status: stable euvolemic    Complications: no apparent anesthesia related complications    Postop vital signs: stable    Dental Exam: Unchanged from Preop    Patient to be discharged from PACU when criteria met.           [FreeTextEntry1] : 1. HTN: controlled. Compliant with medications. \par 2. Hyperlipidemia: on statin. No SE noted. 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 He underwent repeat cardiac catheterization which showed LCx ISR. He underwent PCI and then underwent brachytherapy in 11/2019. He is on ASA and plavix. The patient is scheduled for dental surgery. He denies CP or SOB. ET is limited by PVD.\par 4. PVD: patient has deep fem disease on the R on US in 2015. He had PTCA in 7/2019 at a hospital in [place]. \par Claudication has improved since his last PCI in [place]. Claudication is stable.

## 2024-12-13 NOTE — DISCHARGE NOTE PROVIDER - PREFACE STATEMENT FOR MINUTES SPENT
Anesthesia Transfer of Care Note    Patient: Quincy Villa    Procedure(s) Performed: Procedure(s) (LRB):  COLONOSCOPY, SCREENING, HIGH RISK PATIENT (N/A)    Patient location: GI    Anesthesia Type: general    Transport from OR: Transported from OR on room air with adequate spontaneous ventilation    Post pain: adequate analgesia    Post assessment: no apparent anesthetic complications    Post vital signs: stable    Level of consciousness: awake    Nausea/Vomiting: no nausea/vomiting    Complications: none    Transfer of care protocol was followed      Last vitals: Visit Vitals  /88 (BP Location: Left arm, Patient Position: Lying)   Pulse 64   Temp 37 °C (98.6 °F) (Tympanic)   Resp 14   Ht 6' (1.829 m)   Wt 99.8 kg (220 lb)   SpO2 99%   BMI 29.84 kg/m²      I personally spent

## 2025-02-15 NOTE — PATIENT PROFILE ADULT - HOME ACCESSIBILITY CONCERNS
MAP as low as 62 in the ED per automated cuff  Home Htn meds: Metoprolol tartrate 25 mg BID, Losartan 25 mg QD  Losartan recent decreased from 50 mg due to concerns for hypotension    Plan:  Monitor VS including BP.   If hypotension recurs, confirm via manual, assess symptoms; suggest fluid bolus and discussion w crit care.  Continue home metoprolol tartrate 25 mg BID  Hold losartan 25 mg QD   none
